# Patient Record
Sex: MALE | Race: WHITE | Employment: FULL TIME | URBAN - METROPOLITAN AREA
[De-identification: names, ages, dates, MRNs, and addresses within clinical notes are randomized per-mention and may not be internally consistent; named-entity substitution may affect disease eponyms.]

---

## 2017-10-30 ENCOUNTER — OFFICE VISIT (OUTPATIENT)
Dept: FAMILY MEDICINE | Facility: CLINIC | Age: 42
End: 2017-10-30
Payer: COMMERCIAL

## 2017-10-30 VITALS
OXYGEN SATURATION: 96 % | HEART RATE: 96 BPM | TEMPERATURE: 99.6 F | SYSTOLIC BLOOD PRESSURE: 135 MMHG | WEIGHT: 218 LBS | BODY MASS INDEX: 32.29 KG/M2 | HEIGHT: 69 IN | DIASTOLIC BLOOD PRESSURE: 84 MMHG

## 2017-10-30 DIAGNOSIS — M54.42 CHRONIC BILATERAL LOW BACK PAIN WITH BILATERAL SCIATICA: Primary | ICD-10-CM

## 2017-10-30 DIAGNOSIS — G89.29 CHRONIC BILATERAL LOW BACK PAIN WITH BILATERAL SCIATICA: Primary | ICD-10-CM

## 2017-10-30 DIAGNOSIS — M54.41 CHRONIC BILATERAL LOW BACK PAIN WITH BILATERAL SCIATICA: Primary | ICD-10-CM

## 2017-10-30 PROCEDURE — 99214 OFFICE O/P EST MOD 30 MIN: CPT | Performed by: FAMILY MEDICINE

## 2017-10-30 NOTE — PROGRESS NOTES
SUBJECTIVE:  Rik Lyons is a 41 year old male who is seen for  back injury that occurred 2 day(s) ago. The onset of the pain was sudden.    He has had back pain off and on for about 7 years. He reports that he sees a chiropracter(ic) as needed. He sometimes has to miss work because of his back. He owns a wood polly business.     He was at the "[employer] on Saturday and after his first jump he had the sudden onset of pain in his lower back and down both legs to his ankle. The pain was very intense and he crawled to the side of the [employer]. It has gotten somewhat better but he still has pain down both legs. The pain is in the central lower back.      The patient reports that the pain radiates into the right foot and radiates into the left foot.  On Right  It radiates to the lateral thigh.  It radiates to the lateral knee.  It radiates to the lateral shin.      On Left.   It radiates to the lateral thigh.  It radiates to the lateral knee.  It radiates to the lateral shin.            The patient denies incontinence of urine or stool.    The patient reports that he is employed as a hard wood  and does have a physical job that demands use of his back.  The patient reports that he does not play sports except golf  The patients past medical, surgical, social and family histories were reviewed.  Social History     Social History     Marital status:      Spouse name: N/A     Number of children: N/A     Years of education: N/A     Social History Main Topics     Smoking status: Former Smoker     Types: Cigarettes     Smokeless tobacco: Never Used     Alcohol use Yes      Comment: Occ     Drug use: No     Sexual activity: Yes     Partners: Female     Other Topics Concern     Parent/Sibling W/ Cabg, Mi Or Angioplasty Before 65f 55m? No     Social History Narrative     History reviewed. No pertinent past medical history.  Current Outpatient Prescriptions   Medication Sig Dispense Refill      "nabumetone (RELAFEN) 750 MG tablet 1 tablet twice a day for 10 days and then as needed 60 tablet 1     Loratadine (CLARITIN PO)                  Allergies as of 10/30/2017     (No Known Allergies)     Current Outpatient Prescriptions   Medication     nabumetone (RELAFEN) 750 MG tablet     Loratadine (CLARITIN PO)     No current facility-administered medications for this visit.          Examination:  /84  Pulse 96  Temp 99.6  F (37.6  C) (Oral)  Ht 5' 9\" (1.753 m)  Wt 218 lb (98.9 kg)  SpO2 96%  BMI 32.19 kg/m2  General: healthy, alert and no distress.  Neuro exam of the lower extremities.   DTR's  Right knee 2+  Right ankle 1+  Left knee 2+  Left ankle1+  Strength was +5 globally in the lower extremities except left great toe extension on the left     Back examination: There was no tenderness to palpation of the upper lumbar vertebrae, lower lumbar vertabrae, right paraspinal muscles in the upper lumbar region, right paraspinal muscles in the lower lumbar region, left paraspinal muscles in the upper lumbar region, left paraspinal muscles in the lower lumbar region, right sacroiliac joint and left sacroliliac joint.  There was not CVA tenderness bilaterally.  Straight leg raise test was negative bilaterally.  There was decreased active range of motion in hip flexion bilaterally         Pace sign: negative bilateral(ly)   (Having the seated patient resist abduction and external rotation. Pain and reproduction of symptoms marks a positive test)         ASSESSMENT/IMPRESSION:  DIFFERENTIAL DIAGNOSIS:  musculoskeletal low back pain, compression fracture, acute disc herniation, spinal stenosis. Acute radiculaopathy        PLAN:    The patient and I discussed the absolute importance of continuing to do range of motion exercises and strengthening exercises despite the immediate discomfort that it may cause.We discussed the use of non steroidal anti-inflammatory drugs to help them in this goal. Heat therapy in " the morning to improve range of motion was emphasized.   We will start the patient on relafen .   We will get a lumbar MRI to better evaluate the patient's spine.  We will order a epidural steroid injection and have the patient follow up 1 month after the injection.

## 2017-10-30 NOTE — MR AVS SNAPSHOT
"              After Visit Summary   10/30/2017    Rik Lyons    MRN: [id number]           Patient Information     Date Of Birth          1975        Visit Information        Provider Department      10/30/2017 9:30 AM Eagle Jackson MD [facility]        Today's Diagnoses     Chronic bilateral low back pain with bilateral sciatica    -  1      Care Instructions    Please call our [facility] Scheduling Line at [phone] to schedule your:    MRI                  Follow-ups after your visit        Future tests that were ordered for you today     Open Future Orders        Priority Expected Expires Ordered    MR Lumbar Spine w/o Contrast Routine  10/30/2018 10/30/2017            Who to contact     If you have questions or need follow up information about today's clinic visit or your schedule please contact [facility] directly at [phone].  Normal or non-critical lab and imaging results will be communicated to you by MyChart, letter or phone within 4 business days after the clinic has received the results. If you do not hear from us within 7 days, please contact the clinic through MyChart or phone. If you have a critical or abnormal lab result, we will notify you by phone as soon as possible.  Submit refill requests through [employer] or call your pharmacy and they will forward the refill request to us. Please allow 3 business days for your refill to be completed.          Additional Information About Your Visit        MyChart Information     [employer] lets you send messages to your doctor, view your test results, renew your prescriptions, schedule appointments and more. To sign up, go to [email or web address] . Click on \"Log in\" on the left side of the screen, which will take you to the Welcome page. Then click on \"Sign up Now\" on the right side of the page.     You will be asked to enter the access code listed below, as well as some personal information. Please follow the " "directions to create your username and password.     Your access code is: [email or web address]  Expires: 2018 10:15 AM     Your access code will  in 90 days. If you need help or a new code, please call your [facility] clinic or [phone].        Care EveryWhere ID     This is your Care EveryWhere ID. This could be used by other organizations to access your [facility] medical records  [id number]        Your Vitals Were     Pulse Temperature Height Pulse Oximetry BMI (Body Mass Index)       96 99.6  F (37.6  C) (Oral) 5' 9\" (1.753 m) 96% 32.19 kg/m2        Blood Pressure from Last 3 Encounters:   10/30/17 135/84   16 122/80   16 133/82    Weight from Last 3 Encounters:   10/30/17 218 lb (98.9 kg)   16 214 lb (97.1 kg)   16 211 lb (95.7 kg)                 Today's Medication Changes          These changes are accurate as of: 10/30/17 10:15 AM.  If you have any questions, ask your nurse or doctor.               Start taking these medicines.        Dose/Directions    nabumetone 750 MG tablet   Commonly known as:  RELAFEN   Used for:  Chronic bilateral low back pain with bilateral sciatica   Started by:  Eagle Jackson MD        1 tablet twice a day for 10 days and then as needed   Quantity:  60 tablet   Refills:  1            Where to get your medicines      These medications were sent to [facility] [address] [place] [address]  [facility]   [facility] [address]     Phone:  [phone]     nabumetone 750 MG tablet                Primary Care Provider Office Phone # Fax #    Darrell Jones [phone] [address] [facility] [address]        Equal Access to Services     JESSICA DICKSON : Rhiannon coleman Soomaali, waaxda luqadaha, qaybta kaalmada iram, kiki martin. So [facility] [phone].    ATENCIÓN: Si habla español, tiene a mcginnis disposición servicios " luis de asistencia lingüística. Roxy giles [phone].    We comply with applicable federal civil rights laws and [place] laws. We do not discriminate on the basis of race, color, national origin, age, disability, sex, sexual orientation, or gender identity.            Thank you!     Thank you for choosing [facility]  [facility]. Our goal is always to provide you with excellent care. Hearing back from our patients is one way we can continue to improve our services. Please take a few minutes to complete the written survey that you may receive in the mail after your visit with us. Thank you!             Your Updated Medication List - Protect others around you: Learn how to safely use, store and throw away your medicines at www.disposemymeds.org.          This list is accurate as of: 10/30/17 10:15 AM.  Always use your most recent med list.                   Brand Name Dispense Instructions for use Diagnosis    CLARITIN PO           nabumetone 750 MG tablet    RELAFEN    60 tablet    1 tablet twice a day for 10 days and then as needed    Chronic bilateral low back pain with bilateral sciatica

## 2017-10-30 NOTE — NURSING NOTE
"Chief Complaint   Patient presents with     Back Pain     lower, x 2 weeks. was at a [email or web address]       Initial BP (!) 153/98  Pulse 96  Temp 99.6  F (37.6  C) (Oral)  Ht 5' 9\" (1.753 m)  Wt 218 lb (98.9 kg)  SpO2 96%  BMI 32.19 kg/m2 Estimated body mass index is 32.19 kg/(m^2) as calculated from the following:    Height as of this encounter: 5' 9\" (1.753 m).    Weight as of this encounter: 218 lb (98.9 kg).  Medication Reconciliation: complete     Domi Bray, long      "

## 2017-10-31 ENCOUNTER — HOSPITAL ENCOUNTER (OUTPATIENT)
Dept: MRI IMAGING | Facility: CLINIC | Age: 42
Discharge: HOME | End: 2017-10-31
Attending: FAMILY MEDICINE | Admitting: FAMILY MEDICINE
Payer: COMMERCIAL

## 2017-10-31 DIAGNOSIS — M54.42 CHRONIC BILATERAL LOW BACK PAIN WITH BILATERAL SCIATICA: ICD-10-CM

## 2017-10-31 DIAGNOSIS — M54.41 CHRONIC BILATERAL LOW BACK PAIN WITH BILATERAL SCIATICA: ICD-10-CM

## 2017-10-31 DIAGNOSIS — G89.29 CHRONIC BILATERAL LOW BACK PAIN WITH BILATERAL SCIATICA: ICD-10-CM

## 2017-10-31 PROCEDURE — 72148 MRI LUMBAR SPINE W/O DYE: CPT

## 2017-12-05 ENCOUNTER — TELEPHONE (OUTPATIENT)
Dept: PALLIATIVE MEDICINE | Facility: CLINIC | Age: 42
End: 2017-12-05

## 2017-12-05 ENCOUNTER — OFFICE VISIT (OUTPATIENT)
Dept: FAMILY MEDICINE | Facility: CLINIC | Age: 42
End: 2017-12-05
Payer: COMMERCIAL

## 2017-12-05 VITALS
OXYGEN SATURATION: 100 % | DIASTOLIC BLOOD PRESSURE: 88 MMHG | SYSTOLIC BLOOD PRESSURE: 135 MMHG | HEART RATE: 81 BPM | HEIGHT: 69 IN | WEIGHT: 214 LBS | BODY MASS INDEX: 31.7 KG/M2

## 2017-12-05 DIAGNOSIS — M54.42 CHRONIC MIDLINE LOW BACK PAIN WITH BILATERAL SCIATICA: Primary | ICD-10-CM

## 2017-12-05 DIAGNOSIS — G89.29 CHRONIC MIDLINE LOW BACK PAIN WITH BILATERAL SCIATICA: Primary | ICD-10-CM

## 2017-12-05 DIAGNOSIS — M54.41 CHRONIC MIDLINE LOW BACK PAIN WITH BILATERAL SCIATICA: Primary | ICD-10-CM

## 2017-12-05 PROCEDURE — 99213 OFFICE O/P EST LOW 20 MIN: CPT | Performed by: PHYSICIAN ASSISTANT

## 2017-12-05 NOTE — NURSING NOTE
"Chief Complaint   Patient presents with     Back Pain       Initial /88  Pulse 81  Ht 5' 9\" (1.753 m)  Wt 214 lb (97.1 kg)  SpO2 100%  BMI 31.6 kg/m2 Estimated body mass index is 31.6 kg/(m^2) as calculated from the following:    Height as of this encounter: 5' 9\" (1.753 m).    Weight as of this encounter: 214 lb (97.1 kg).  Medication Reconciliation: complete  Louisa Charles CMA    "

## 2017-12-05 NOTE — TELEPHONE ENCOUNTER
Pre-screening Questions for Radiology Injections:    Injection to be done at which interventional clinic site? [facility]    Procedure ordered by Oppeemily, Ankit Deras    Procedure ordered? Lumbar Epidural Steroid Injection    What insurance would patient like us to bill for this procedure? BCBS      Worker's comp or MVA (motor vehicle accident) -Any injection DO NOT SCHEDULE and route to Breana Villafana.      [employer] insurance - For SI joint injections, DO NOT SCHEDULE and route Poornima Reyes.      [employer]- MBB's must be scheduled at LEAST two weeks apart      [employer] - Any injection besides hip/shoulder/knee joint DO NOT SCHEDULE and route to Poornima Reyes. She will obtain PA and call pt back to schedule procedure or notify pt of denial.       HP CIGNA-PA REQUIRED FOR NON-ERICKA OR Joint injections    Any chance of pregnancy? Not Applicable   If YES, do NOT schedule and route to RN pool    Is an  needed? No     Patient has a drive home? (mandatory) YES:     Is patient taking any blood thinners (plavix, coumadin, jantoven, warfarin, heparin, pradaxa or dabigatran )? No   If hold needed, do NOT schedule, route to RN pool     Is patient taking any aspirin products? No     If more than 325mg/day do NOT schedule; route to RN pool     For CERVICAL procedures, hold all aspirin products for 6 days.      Does the patient have a bleeding or clotting disorder? No     If YES, okay to schedule AND route to RN nurse pool    **For any patients with platelet count <100, must be forwarded to provider**    Is patient diabetic?  No  If YES, have them bring their glucometer.    Does patient have an active infection or treated for one within the past week? No     Is patient currently taking any antibiotics?  No     For patients on chronic, preventative, or prophylactic antibiotics, procedures may be scheduled.     For patients on antibiotics for active or recent infection:    Lele Berger  Conchita Benito Burton, Snitzer-antibiotic course must have been completed for 4 days    Dr. Powell-antibiotic course must have been completed for 7 days    Is patient currently taking any steroid medications? (i.e. Prednisone, Medrol)  No     For patients on steroid medications:    Conchita Morgan Burton, Snitzer-steroid course must have been completed for 4 days    -steroid course must have been completed for 7 days    Reviewed with patient:  If you are started on any steroids or antibiotics between now and your appointment, you must contact us because it may affect our ability to perform your procedure.  Yes    Is patient actively being treated for cancer or immunocompromised? No  If YES, do NOT schedule and route to RN pool     Are you able to get on and off an exam table with minimal or no assistance? Yes  If NO, do NOT schedule and route to RN pool    Are you able to roll over and lay on your stomach with minimal or no assistance? Yes  If NO, do NOT schedule and route to RN pool     Any allergies to contrast dye, iodine, shellfish, or numbing and steroid medications? No  If YES, route to RN pool AND add allergy information to appointment notes    Allergies: Review of patient's allergies indicates no known allergies.      Has the patient had a flu shot or any other vaccinations within 7 days before or after the procedure.  No     Does patient have an MRI/CT?  YES:   (SI joint, hip injections, lumbar sympathetic blocks, and stellate ganglion blocks do not require an MRI)    Was the MRI done w/in the last 3 years?  Yes    Was MRI done at [facility]? Yes      If not, where was it done? N/A       If MRI was not done at [facility], [facility] or [facility] do NOT schedule and route to nursing.  If pt has an imaging disc, the injection may be scheduled but pt has to bring disc to appt. If they show up w/out disc the injection cannot be done    Reminders (please tell patient if applicable):       Instructed pt  to arrive 30 minutes early for IV start if this is for a cervical procedure, ALL sympathetic (stellate ganglion, hypogastric, or lumbar sympathetic block) and all sedation procedures (RFA, spinal cord stimulation trials).  Not Applicable   -IVs are not routinely placed for Dr. Sutherland cervical cases   -Dr. Chan: IVs for cervical ESIs and cervical TBDs (not CMBBs/facet inj)      If NPO for sedation, informed patient that it is okay to take medications with sips of water (except if they are to hold blood thinners).  Not Applicable   *DO take blood pressure medication if it is prescribed*      If this is for a cervical ERICKA, informed patient that aspirin needs to be held for 6 days.   Not Applicable      For all patients not having spinal cord stimulator (SCS) trials or radiofrequency ablations (RFAs), informed patient:    IV sedation is not provided for this procedure.  If you feel that an oral anti-anxiety medication is needed, you can discuss this further with your referring provider or primary care provider.  The Pain Clinic provider will discuss specifics of what the procedure includes at your appointment.  Most procedures last 10-20 minutes.  We use numbing medications to help with any discomfort during the procedure.  Not Applicable      Do not schedule procedures requiring IV placement in the first appointment of the day or first appointment after lunch.       For patients 85 or older we recommend having an adult stay w/ them for the remainder of the day.       Does the patient have any questions?  NO  Joe Bermudez  [facility]

## 2017-12-05 NOTE — MR AVS SNAPSHOT
After Visit Summary   12/5/2017    Rik Lyons    MRN: [id number]           Patient Information     Date Of Birth          1975        Visit Information        Provider Department      12/5/2017 2:10 PM Ankit Schulte PA-C [facility]        Today's Diagnoses     Chronic midline low back pain with bilateral sciatica    -  1       Follow-ups after your visit        Additional Services     PAIN MANAGEMENT REFERRAL       Your provider has referred you to: [facility]: [facility] -    Reason for Referral: Procedure or injection only - patient will be contacted within 24 hrs to schedule: Epidural Steroi: Lumbar    Please complete the following questions:    What is your diagnosis for the patient's pain? Low back pain with radiculopathy    Do you have any specific questions for the pain specialist? No    Are there any red flags that may impact the assessment or management of the patient? None    For any questions, contact the [facility] at [phone].     **ANY DIAGNOSTIC TESTS THAT ARE NOT IN EPIC SHOULD BE SENT TO THE PAIN CENTER**    REGARDING OPIOID MEDICATIONS:  We will always address appropriateness of opioid pain medications, but we generally will not automatically take on a prescribing role. When we do take on prescribing of opioids for chronic pain, it is in collaboration with the referring physician for an intermediate period of time (months), with an expectation that the primary physician or provider will assume the prescribing role if medications are effective at stable doses with demonstrated compliance.  Therefore, please do not assume that your prescribing responsibilities end on the day of pain clinic consultation.  Is this agreeable to you? YES    Please be aware that coverage of these services is subject to the terms and limitations of your health insurance plan.  Call member services at your health plan with any benefit or  "coverage questions.      Please bring the following with you to your appointment:    (1) Any X-Rays, CTs or MRIs which have been performed.  Contact the facility where they were done to arrange for  prior to your scheduled appointment.    (2) List of current medications   (3) This referral request   (4) Any documents/labs given to you for this referral                  Who to contact     If you have questions or need follow up information about today's clinic visit or your schedule please contact [facility] directly at [phone].  Normal or non-critical lab and imaging results will be communicated to you by [employer], letter or phone within 4 business days after the clinic has received the results. If you do not hear from us within 7 days, please contact the clinic through [employer] or phone. If you have a critical or abnormal lab result, we will notify you by phone as soon as possible.  Submit refill requests through [employer] or call your pharmacy and they will forward the refill request to us. Please allow 3 business days for your refill to be completed.          Additional Information About Your Visit        [employer] Information     [employer] lets you send messages to your doctor, view your test results, renew your prescriptions, schedule appointments and more. To sign up, go to [email or web address] . Click on \"Log in\" on the left side of the screen, which will take you to the Welcome page. Then click on \"Sign up Now\" on the right side of the page.     You will be asked to enter the access code listed below, as well as some personal information. Please follow the directions to create your username and password.     Your access code is: [email or web address]  Expires: 2018  9:15 AM     Your access code will  in 90 days. If you need help or a new code, please call your [facility] or [phone].        Care EveryWhere ID     This is your Care EveryWhere ID. This could be used by other " "organizations to access your [facility] medical records  [id number]        Your Vitals Were     Pulse Height Pulse Oximetry BMI (Body Mass Index)          81 5' 9\" (1.753 m) 100% 31.6 kg/m2         Blood Pressure from Last 3 Encounters:   12/05/17 135/88   10/30/17 135/84   12/16/16 122/80    Weight from Last 3 Encounters:   12/05/17 214 lb (97.1 kg)   10/30/17 218 lb (98.9 kg)   12/16/16 214 lb (97.1 kg)              We Performed the Following     PAIN MANAGEMENT REFERRAL        Primary Care Provider Office Phone # Fax #    Darrell Jones [phone] [address] [facility] [address]        Equal Access to Services     LOU DICKSON : Hadii marlene miller hadasho Soomaali, waaxda luqadaha, qaybta kaalmada adeegyada, waxtariq byrd hayrama langley . So [facility] [place] [phone].    ATENCIÓN: Si habla español, tiene a mcginnis disposición servicios gratuitos de asistencia lingüística. Llame al [phone].    We comply with applicable federal civil rights laws and [place] laws. We do not discriminate on the basis of race, color, national origin, age, disability, sex, sexual orientation, or gender identity.            Thank you!     Thank you for choosing [facility]  for your care. Our goal is always to provide you with excellent care. Hearing back from our patients is one way we can continue to improve our services. Please take a few minutes to complete the written survey that you may receive in the mail after your visit with us. Thank you!             Your Updated Medication List - Protect others around you: Learn how to safely use, store and throw away your medicines at www.disposemymeds.org.          This list is accurate as of: 12/5/17  2:28 PM.  Always use your most recent med list.                   Brand Name Dispense Instructions for use Diagnosis    CLARITIN PO           nabumetone 750 MG tablet    RELAFEN    60 tablet    1 tablet twice a day for 10 days and then as needed "    Chronic bilateral low back pain with bilateral sciatica

## 2017-12-05 NOTE — PROGRESS NOTES
SUBJECTIVE:                                                    Rik Lyons is a 42 year old male who presents to clinic today for the following health issues:    Back Pain      Duration: 1.5 months        Specific cause: [place] [employer] [place]    Description:   Location of pain: low back   Character of pain: sharp and dull ache  Pain radiation:down into bilateral legs, groin area   New numbness or weakness in legs, not attributed to pain:  no     Intensity: Currently 3/10    History:   Pain interferes with job: YES  History of back problems: yes, off and on for the last 7 years  Any previous MRI or X-rays: Yes- at [facility].  Date 10/31/17  Sees a specialist for back pain:  No  Therapies tried without relief:     Alleviating factors:   Improved by: ibuprofen and Relafen help some       Precipitating factors:  Worsened by: Lifting, Bending and Sitting for period of time    bernard in bernard thighs and in groin. No loss of bowl or bladder function.       Here today expecting a cortisone injection in back. Was told that I do them in clinic when he called in to make an appointment.     Problem list and histories reviewed & adjusted, as indicated.  Additional history: as documented    Patient Active Problem List   Diagnosis     Headache     Cervicalgia     Elevated blood pressure reading without diagnosis of hypertension     Past Surgical History:   Procedure Laterality Date     APPENDECTOMY         Social History   Substance Use Topics     Smoking status: Former Smoker     Types: Cigarettes     Smokeless tobacco: Never Used     Alcohol use Yes      Comment: Occ     Family History   Problem Relation Age of Onset     Other Cancer Mother      Coronary Artery Disease Father      age 53     DIABETES Father          Current Outpatient Prescriptions   Medication Sig Dispense Refill     nabumetone (RELAFEN) 750 MG tablet 1 tablet twice a day for 10 days and then as needed 60 tablet 1     Loratadine (CLARITIN PO)        No Known  "Allergies  Problem list, Medication list, Allergies, and Medical/Social/Surgical histories reviewed in [facility] and updated as appropriate.    ROS:  C: NEGATIVE for fever, chills, change in weight  E/M: NEGATIVE for ear, mouth and throat problems  R: NEGATIVE for significant cough or SOB  CV: NEGATIVE for chest pain, palpitations or peripheral edema    OBJECTIVE:                                                    /88  Pulse 81  Ht 5' 9\" (1.753 m)  Wt 214 lb (97.1 kg)  SpO2 100%  BMI 31.6 kg/m2  Body mass index is 31.6 kg/(m^2).   GENERAL: healthy, alert, well nourished, well hydrated, no distress  RESP: lungs clear to auscultation - no rales, no rhonchi, no wheezes  CV: regular rates and rhythm, normal S1 S2, no S3 or S4 and no murmur, no click or rub -  ABDOMEN: soft, no tenderness, no  hepatosplenomegaly, no masses, normal bowel sounds  Lumber/Thoracic Spine Exam: Tender:  None today  Range of Motion:  full range of motion bernard lower extremities   Strength:  5/5 bernard lower extremities   Special tests:  negative straight leg raises, today  Hip Exam: Hip ROM full    Diagnostic test results:  Diagnostic Test Results:  Results for orders placed or performed during the hospital encounter of 10/31/17   MR Lumbar Spine w/o Contrast    Narrative    MR LUMBAR SPINE WITHOUT CONTRAST 10/31/2017 8:30 AM    HISTORY: Bilateral low back pain and sciatica.    TECHNIQUE: Sagittal T1 and T2, sagittal IR, and transverse proton  density and T2-weighted pulse sequences.    FINDINGS: Five lumbar vertebrae are assumed. Mild loss of disc signal  with relatively normal disc height is noted at L5-S1. Moderate left  L4-5 apophyseal joint degenerative arthrosis is noted with mild  periarticular reactive marrow edema. Body heights and sagittal  alignment are within normal limits. The conus medullaris is  unremarkable in appearance on the sagittal images.     L2-3, L3-4: Normal.    L4-5: Annular bulge or broad-based central disc " protrusion minimally  indenting the thecal sac. No central stenosis. Mild to moderate  bilateral foraminal stenosis below the exiting nerve roots.    L5-S1: Small central disc extrusion with slight cephalad dissection  slightly to the right of midline. This only mildly indents the thecal  sac. Small 0.3 cm disc fragment is also noted lateral to the right S1  nerve root near its origin from the thecal sac. There is no central  stenosis or asymmetrical displacement of either S1 nerve root. The L5  neural foramina are widely patent.      Impression    IMPRESSION:  1. L4-5 left apophyseal joint degenerative arthrosis and periarticular  reactive marrow edema suggesting this could be a pain generator.  2. L4-5 mild to moderate bilateral foraminal stenosis.  3. L5-S1 small cephalad dissecting disc extrusion to the right of  midline without apparent direct neural impingement or stenosis. Small  0.3 cm disc fragment is also noted lateral to the right S1 nerve root  near its origin from the thecal sac. No nerve root displacement is  demonstrated.    ALDAIR ELENA MD          ASSESSMENT/PLAN:                                                      ICD-10-CM    1. Chronic midline low back pain with bilateral sciatica M54.41 PAIN MANAGEMENT REFERRAL    M54.42     G89.29    discussed that I don't perform epidural injections and that will need to be ordered and done by specialist.   Discussed other options of tx which include and not limited to: PHYSICAL THERAPY , chiropractor, oral prednisone.   Wants epidural injection, this was ordered.     Ankit Schulte PA-C  [facility]

## 2017-12-21 ENCOUNTER — RADIANT APPOINTMENT (OUTPATIENT)
Dept: RADIOLOGY | Facility: CLINIC | Age: 42
End: 2017-12-21
Attending: PAIN MEDICINE
Payer: COMMERCIAL

## 2017-12-21 ENCOUNTER — RADIOLOGY INJECTION OFFICE VISIT (OUTPATIENT)
Dept: PALLIATIVE MEDICINE | Facility: CLINIC | Age: 42
End: 2017-12-21
Payer: COMMERCIAL

## 2017-12-21 VITALS — SYSTOLIC BLOOD PRESSURE: 162 MMHG | DIASTOLIC BLOOD PRESSURE: 108 MMHG | HEART RATE: 97 BPM

## 2017-12-21 DIAGNOSIS — M54.41 CHRONIC BILATERAL LOW BACK PAIN WITH BILATERAL SCIATICA: ICD-10-CM

## 2017-12-21 DIAGNOSIS — G89.29 CHRONIC BILATERAL LOW BACK PAIN WITH BILATERAL SCIATICA: ICD-10-CM

## 2017-12-21 DIAGNOSIS — M54.42 CHRONIC BILATERAL LOW BACK PAIN WITH BILATERAL SCIATICA: ICD-10-CM

## 2017-12-21 DIAGNOSIS — M54.16 LUMBAR RADICULOPATHY: Primary | ICD-10-CM

## 2017-12-21 PROCEDURE — 62323 NJX INTERLAMINAR LMBR/SAC: CPT | Performed by: PAIN MEDICINE

## 2017-12-21 ASSESSMENT — PAIN SCALES - GENERAL: PAINLEVEL: MILD PAIN (3)

## 2017-12-21 NOTE — PROGRESS NOTES
Pre procedure Diagnosis: Lumbar radiculopathy  Post procedure Diagnosis: Same  Procedure performed: L4-5 interlaminar epidural steroid injection   Anesthesia: None  Complications: None  Operators: Rajiv Chan MD     Indications:   Rik Lyons is a 42 year old male.  The patient has a history of bilateral low back pain radiating to the anterior surface of the foot.  Examination shows positive SLR.  he has tried conservative treatment including meds.    MRI   IMPRESSION:  1. L4-5 left apophyseal joint degenerative arthrosis and periarticular  reactive marrow edema suggesting this could be a pain generator.  2. L4-5 mild to moderate bilateral foraminal stenosis.  3. L5-S1 small cephalad dissecting disc extrusion to the right of  midline without apparent direct neural impingement or stenosis. Small  0.3 cm disc fragment is also noted lateral to the right S1 nerve root  near its origin from the thecal sac. No nerve root displacement is  demonstrated.  Options/alternatives, benefits and risks were discussed with the patient including but not limited to bleeding, infection, no pain relief, tissue trauma, exposure to radiation, reaction to medications, spinal cord injury, dural puncture, weakness, numbness and headache.  Questions were answered to his satisfaction and he wishes to proceed. Voluntary informed consent was obtained and signed.     Vitals were reviewed: Yes  Allergies were reviewed:  Yes   Medications were reviewed:  Yes   Pre-procedure pain score: 3/10    Procedure:  The patient's medical history, medications, and allergies were reviewed and reconciled.  After obtaining signed informed consent, the patient was brought into the procedure suite and was placed in a prone position on the procedure table.   A Pause for the Cause was performed.  Patient was prepped and draped in the usual sterile fashion.     The L 4-5 interspace was identified with AP fluoroscopy.  A total of 6ml of 1% lidocaine was used to  anesthetize the skin and subcutaneous tissues for a  midline approach.    A 20gauge 3.5inch Touhy needle was advanced utilizing intermittent AP and Lateral fluoroscopy and air for loss of resistance.  The epidural space was encountered on the first pass without difficulties.  Aspiration for blood and CSF was negative.  Needle position was verified by injecting 1 ml of Omnipaque utilizing real-time fluoroscopy that showed good needle placement and epidural spread without signs of intravascular or intrathecal uptake.  Omnipaque wasted:  9 ml.    Then, after repeated negative aspiration for blood or CSF, a combination of Kenalog 40 mg, Bupivicaine 0.25% 2 ml, diluted with 3 ml of normal saline to a total injectate volume of 6 ml was injected into the epidural space in a slow and incremental fashion and the needle was restyletted and withdrawn.  All injected medications were preservative free.    The injection site was cleaned and a sterile dressing was applied.    The patient tolerated the procedure well without complications and was taken to the recovery room for continued observation.    Images were saved to PACS.    Post-procedure pain score: 0/10  Follow-up includes:   -f/u phone call in one week  -f/u with referring provider     Rajiv Chan MD  [facility]

## 2017-12-21 NOTE — MR AVS SNAPSHOT
After Visit Summary   12/21/2017    Rik Lyons    MRN: [id number]           Patient Information     Date Of Birth          1975        Visit Information        Provider Department      12/21/2017 1:15 PM Rajiv Chan MD [facility]        Care Instructions    [facility]   Procedure Discharge Instructions    Today you saw: Dr. Rajiv Chan    You had an:  Epidural steroid injection   -lumbar     Medications used:  Lidocaine   Bupivacaine  Omnipaque    Kenalog             Be cautious when walking. Numbness and/or weakness in the lower extremities may occur for up to 6-8 hours after the procedure due to effect of the local anesthetic    Do not drive for 6 hours. The effect of the local anesthetic could slow your reflexes.     You may resume your regular activities after 24 hours    Avoid strenuous activity for the first 24 hours    You may shower, however avoid swimming, tub baths or hot tubs for 24 hours following your procedure    You may have a mild to moderate increase in pain for several days following the injection.    It may take up to 14 days for the steroid medication to start working although you may feel the effect as early as a few days after the procedure.       You may use ice packs for 10-15 minutes, 3 to 4 times a day at the injection site for comfort    Do not use heat to painful areas for 6 to 8 hours. This will give the local anesthetic time to wear off and prevent you from accidentally burning your skin.     You may use anti-inflammatory medications (such as Ibuprofen or Aleve or Advil) or Tylenol for pain control if necessary    If you were fasting, you may resume your normal diet and medications after the procedure    If you have diabetes, check your blood sugar more frequently than usual as your blood sugar may be higher than normal for 10-14 days following a steroid injection. Contact your doctor who manages your diabetes  "if your blood sugar is higher than usual    If you experience any of the following, call the pain center nursing line during work hours at [phone] or the after hours provider line at [phone]:  -Fever over 100 degree F  -Swelling, bleeding, redness, drainage, warmth at the injection site  -Progressive weakness or numbness in your legs or arms  -Loss of bowel or bladder function  -Unusual headache that is not relieved by Tylenol or other pain reliever  -Unusual new onset of pain that is not improving      Phone #s:  Appointment line: [phone];  Nurse line: [phone]              Follow-ups after your visit        Who to contact     If you have questions or need follow up information about today's clinic visit or your schedule please contact [facility] directly at [phone].  Normal or non-critical lab and imaging results will be communicated to you by MyChart, letter or phone within 4 business days after the clinic has received the results. If you do not hear from us within 7 days, please contact the clinic through [employer] or phone. If you have a critical or abnormal lab result, we will notify you by phone as soon as possible.  Submit refill requests through [employer] or call your pharmacy and they will forward the refill request to us. Please allow 3 business days for your refill to be completed.          Additional Information About Your Visit        [email or web address] Information     [employer] lets you send messages to your doctor, view your test results, renew your prescriptions, schedule appointments and more. To sign up, go to [email or web address] . Click on \"Log in\" on the left side of the screen, which will take you to the Welcome page. Then click on \"Sign up Now\" on the right side of the page.     You will be asked to enter the access code listed below, as well as some personal information. Please follow the directions to create your username and password.     Your access code is: " [email or web address]  Expires: 2018  9:15 AM     Your access code will  in 90 days. If you need help or a new code, please call your [facility] clinic or [phone].        Care EveryWhere ID     This is your Care EveryWhere ID. This could be used by other organizations to access your [facility] medical records  [id number]        Your Vitals Were     Pulse                   97            Blood Pressure from Last 3 Encounters:   17 (!) 162/108   17 135/88   10/30/17 135/84    Weight from Last 3 Encounters:   17 97.1 kg (214 lb)   10/30/17 98.9 kg (218 lb)   16 97.1 kg (214 lb)              Today, you had the following     No orders found for display       Primary Care Provider Office Phone # Fax #    Darrell Jose Jones [phone] [address] [facility] [address]        Equal Access to Services     JESSICA [facility] Hospital[facility] : Hadii aad ku hadasho Soomaali, waaxda luqadaha, qaybta kaalmada adeegyada, waxay idiin hayaan adeeg kharash la'cucon . So [facility] [phone].    ATENCIÓN: Si habla español, tiene a mcginnis disposición servicios gratuitos de asistencia lingüística. Llame al [phone].    We comply with applicable federal civil rights laws and [place] laws. We do not discriminate on the basis of race, color, national origin, age, disability, sex, sexual orientation, or gender identity.            Thank you!     Thank you for choosing [facility]  for your care. Our goal is always to provide you with excellent care. Hearing back from our patients is one way we can continue to improve our services. Please take a few minutes to complete the written survey that you may receive in the mail after your visit with us. Thank you!             Your Updated Medication List - Protect others around you: Learn how to safely use, store and throw away your medicines at www.disposemymeds.org.          This list is accurate as of: 17  1:46 PM.  Always use your most recent med  list.                   Brand Name Dispense Instructions for use Diagnosis    CLARITIN PO           nabumetone 750 MG tablet    RELAFEN    60 tablet    1 tablet twice a day for 10 days and then as needed    Chronic bilateral low back pain with bilateral sciatica

## 2017-12-21 NOTE — PATIENT INSTRUCTIONS
[facility]   Procedure Discharge Instructions    Today you saw: Dr. Rajiv Chan    You had an:  Epidural steroid injection   -lumbar     Medications used:  Lidocaine   Bupivacaine  Omnipaque    Kenalog             Be cautious when walking. Numbness and/or weakness in the lower extremities may occur for up to 6-8 hours after the procedure due to effect of the local anesthetic    Do not drive for 6 hours. The effect of the local anesthetic could slow your reflexes.     You may resume your regular activities after 24 hours    Avoid strenuous activity for the first 24 hours    You may shower, however avoid swimming, tub baths or hot tubs for 24 hours following your procedure    You may have a mild to moderate increase in pain for several days following the injection.    It may take up to 14 days for the steroid medication to start working although you may feel the effect as early as a few days after the procedure.       You may use ice packs for 10-15 minutes, 3 to 4 times a day at the injection site for comfort    Do not use heat to painful areas for 6 to 8 hours. This will give the local anesthetic time to wear off and prevent you from accidentally burning your skin.     You may use anti-inflammatory medications (such as Ibuprofen or Aleve or Advil) or Tylenol for pain control if necessary    If you were fasting, you may resume your normal diet and medications after the procedure    If you have diabetes, check your blood sugar more frequently than usual as your blood sugar may be higher than normal for 10-14 days following a steroid injection. Contact your doctor who manages your diabetes if your blood sugar is higher than usual    If you experience any of the following, call the pain center nursing line during work hours at [phone] or the after hours provider line at [phone]:  -Fever over 100 degree F  -Swelling, bleeding, redness, drainage, warmth at the injection  site  -Progressive weakness or numbness in your legs or arms  -Loss of bowel or bladder function  -Unusual headache that is not relieved by Tylenol or other pain reliever  -Unusual new onset of pain that is not improving      Phone #s:  Appointment line: [phone];  Nurse line: [phone]

## 2017-12-21 NOTE — NURSING NOTE
"Chief Complaint   Patient presents with     Pain       Initial BP (!) 162/108  Pulse 97 Estimated body mass index is 31.6 kg/(m^2) as calculated from the following:    Height as of 12/5/17: 1.753 m (5' 9\").    Weight as of 12/5/17: 97.1 kg (214 lb).  Medication Reconciliation: complete     Pre-procedure Intake    Have you been fasting? NA    If yes, for how long? No     Are you taking a prescribed blood thinner such as coumadin, Plavix, Xarelto?    No    If yes, when did you take your last dose? No     Do you take aspirin?  No    If cervical procedure, have you held aspirin for 6 days?   NA    Do you have any allergies to contrast dye, iodine, steroid and/or numbing medications?  Not Applicable    Are you currently taking antibiotics or have an active infection?  NO    Have you had a fever/elevated temperature within the past week? NO    Are you currently taking oral steroids? NO    Do you have a ? Yes       Are you pregnant or breastfeeding?  Not Applicable    Are the vital signs normal?  Yes    Lul Ambriz MA            "

## 2017-12-29 ENCOUNTER — TELEPHONE (OUTPATIENT)
Dept: PALLIATIVE MEDICINE | Facility: CLINIC | Age: 42
End: 2017-12-29

## 2017-12-29 NOTE — TELEPHONE ENCOUNTER
Patient is doing well. He said that the pain in his leg is gone and has not come back. He is hoping for a good few months of relief.    Kimberly Osei RT (R)

## 2018-05-04 NOTE — PATIENT INSTRUCTIONS
Lifestyle recommendations:  Being overweight or obese puts you are risk of major health problems including but not limited to: heart disease/heart attack, stroke, high cholesterol, high blood pressure, and diabetes.  This is why it is important to be at a healthy weight for your height.     Exercise 30 minutes 3-5 times a week, if you can only do 10 minutes 3 times a week that is still shown to have great benefit!  Brisk walking even counts for this.  Consider free youtube videos for exercise that fits your needs and lifestyle.     Monitor your caffeine and soda intake, try to minimize these beverages    Drink plenty of water (about 70-80 ounces a day)    Try to eat a vegetable and fruit  with lunch and dinner.  Have a breakfast that contains protein such as eggs or oatmeal.  Decrease your white bread, pasta, and sweets intake.  Increase lean proteins like chicken or pork. Try to eat out 1-2 times a week or less.  Monitor your portion sizes, try using smaller plates if needed.  Eat slowly, this gives you time to be aware that your body is full.   Let me know at any time if you would like a referral to a nutritionist!            Preventive Health Recommendations  Male Ages 40 to 49    Yearly exam:             See your health care provider every year in order to  o   Review health changes.   o   Discuss preventive care.    o   Review your medicines if your doctor has prescribed any.    You should be tested each year for STDs (sexually transmitted diseases) if you re at risk.     Have a cholesterol test every 5 years.     Have a colonoscopy (test for colon cancer) if someone in your family has had colon cancer or polyps before age 50.     After age 45, have a diabetes test (fasting glucose). If you are at risk for diabetes, you should have this test every 3 years.      Talk with your health care provider about whether or not a prostate cancer screening test (PSA) is right for you.    Shots: Get a flu shot each year.  Get a tetanus shot every 10 years.     Nutrition:    Eat at least 5 servings of fruits and vegetables daily.     Eat whole-grain bread, whole-wheat pasta and brown rice instead of white grains and rice.     Talk to your provider about Calcium and Vitamin D.     Lifestyle    Exercise for at least 150 minutes a week (30 minutes a day, 5 days a week). This will help you control your weight and prevent disease.     Limit alcohol to one drink per day.     No smoking.     Wear sunscreen to prevent skin cancer.     See your dentist every six months for an exam and cleaning.

## 2018-05-04 NOTE — PROGRESS NOTES
SUBJECTIVE:   CC: Rik Lyons is an 42 year old male who presents for preventative health visit.     Healthy Habits:    Answers for HPI/ROS submitted by the patient on 5/7/2018   Annual Exam:  Getting at least 3 servings of Calcium per day:: Yes  Bi-annual eye exam:: NO  Dental care twice a year:: NO  Sleep apnea or symptoms of sleep apnea:: Daytime drowsiness, Excessive snoring  Diet:: Regular (no restrictions)  Frequency of exercise:: 2-3 days/week  Taking medications regularly:: Yes  Medication side effects:: None  Additional concerns today:: No  PHQ-2 Score: 0  Duration of exercise:: 15-30 minutes    Discuss High BP readings per pt was on BP meds in the past. He was on HCTZ in the past but it made him pee too much.  Had high blood pressure at dentist but did drink alcohol in large quantity the night before.         Chronic Back pain from pinched nerves per patient-gets cortisone shots every 6 months. nabumetone PRN.     Alcohol---8-9 drinks only on weekend not every weekend.  More binge drinks.   Caffeine-1 coffee in the morning and 1 in pm, not much pop    Former smoker. E-cig.      bmi 31.  Is trying to lose weight to help with his back, we discussed will also help his blood pressure.     Agrees to PSA.    No skin changes/concerns.     FH: Dad with defib. And CAD And diabetes, And high blood pressure.  Mom with lung cancer. No FH of colon cancer.   No chest pain, shortness of breath, edema, pnd or orthopnea.     Today's PHQ-2 Score:   PHQ-2 ( 1999 Pfizer) 5/7/2018 12/16/2016   Q1: Little interest or pleasure in doing things 0 0   Q2: Feeling down, depressed or hopeless 0 0   PHQ-2 Score 0 0   Q1: Little interest or pleasure in doing things Not at all -   Q2: Feeling down, depressed or hopeless Not at all -   PHQ-2 Score 0 -       Abuse: Current or Past(Physical, Sexual or Emotional)- No  Do you feel safe in your environment - Yes    Social History   Substance Use Topics     Smoking status: Former Smoker      Types: Cigarettes     Smokeless tobacco: Never Used     Alcohol use Yes      Comment: Occ      If you drink alcohol do you typically have >3 drinks per day or >7 drinks per week? Yes - AUDIT SCORE:  7  AUDIT - Alcohol Use Disorders Identification Test - Reproduced from the World Health Organization Audit 2001 (Second Edition) 5/7/2018   1.  How often do you have a drink containing alcohol? 2 to 4 times a month   2.  How many drinks containing alcohol do you have on a typical day when you are drinking? 7 to 9   3.  How often do you have five or more drinks on one occasion? Monthly   4.  How often during the last year have you found that you were not able to stop drinking once you had started? Never   5.  How often during the last year have you failed to do what was normally expected of you because of drinking? Never   6.  How often during the last year have you needed a first drink in the morning to get yourself going after a heavy drinking session? Never   7.  How often during the last year have you had a feeling of guilt or remorse after drinking? Never   8.  How often during the last year have you been unable to remember what happened the night before because of your drinking? Never   9.  Have you or someone else been injured because of your drinking? No   10. Has a relative, friend, doctor or other health care worker been concerned about your drinking or suggested you cut down? No   TOTAL SCORE 7                         Last PSA: No results found for: PSA    Reviewed orders with patient. Reviewed health maintenance and updated orders accordingly - Yes  BP Readings from Last 3 Encounters:   05/07/18 (!) 138/94   12/21/17 (!) 162/108   12/05/17 135/88    Wt Readings from Last 3 Encounters:   05/07/18 212 lb (96.2 kg)   12/05/17 214 lb (97.1 kg)   10/30/17 218 lb (98.9 kg)                  Patient Active Problem List   Diagnosis     Headache     Cervicalgia     Elevated blood pressure reading without diagnosis of  "hypertension     Past Surgical History:   Procedure Laterality Date     APPENDECTOMY         Social History   Substance Use Topics     Smoking status: Former Smoker     Types: Cigarettes     Smokeless tobacco: Never Used     Alcohol use Yes      Comment: Occ     Family History   Problem Relation Age of Onset     Other Cancer Mother      Coronary Artery Disease Father      age 53     DIABETES Father          Current Outpatient Prescriptions   Medication Sig Dispense Refill     Loratadine (CLARITIN PO)        losartan (COZAAR) 25 MG tablet Take 0.5 tablets (12.5 mg) by mouth daily 15 tablet 1     nabumetone (RELAFEN) 750 MG tablet 1 tablet twice a day for 10 days and then as needed 60 tablet 1     [DISCONTINUED] nabumetone (RELAFEN) 750 MG tablet 1 tablet twice a day for 10 days and then as needed (Patient not taking: Reported on 5/7/2018) 60 tablet 1       Reviewed and updated as needed this visit by clinical staff  Tobacco  Allergies  Meds  Med Hx  Surg Hx  Fam Hx  Soc Hx        Reviewed and updated as needed this visit by Provider        Past Medical History:   Diagnosis Date     NO ACTIVE PROBLEMS       Past Surgical History:   Procedure Laterality Date     APPENDECTOMY         ROS:  C: NEGATIVE for fever, chills, change in weight  I: NEGATIVE for worrisome rashes, moles or lesions  E: NEGATIVE for vision changes or irritation  ENT: NEGATIVE for ear, mouth and throat problems  R: NEGATIVE for significant cough or SOB  CV: NEGATIVE for chest pain, palpitations or peripheral edema  GI: NEGATIVE for nausea, abdominal pain, heartburn, or change in bowel habits   male: negative for dysuria, hematuria, decreased urinary stream, erectile dysfunction, urethral discharge  M: NEGATIVE for significant arthralgias or myalgia  N: NEGATIVE for weakness, dizziness or paresthesias  P: NEGATIVE for changes in mood or affect    OBJECTIVE:   BP (!) 138/94  Pulse 104  Temp 98.4  F (36.9  C) (Oral)  Resp 18  Ht 5' 9\" " (1.753 m)  Wt 212 lb (96.2 kg)  SpO2 99%  BMI 31.31 kg/m2  EXAM:  GENERAL: healthy, alert and no distress  EYES: Eyes grossly normal to inspection, PERRL and conjunctivae and sclerae normal  HENT: ear canals and TM's normal, nose and mouth without ulcers or lesions  NECK: no adenopathy, no asymmetry, masses, or scars and thyroid normal to palpation  RESP: lungs clear to auscultation - no rales, rhonchi or wheezes  CV: regular rate and rhythm, normal S1 S2, no S3 or S4, no murmur, click or rub, no peripheral edema and peripheral pulses strong  ABDOMEN: soft, nontender, no hepatosplenomegaly, no masses and bowel sounds normal  MS: no gross musculoskeletal defects noted, no edema  SKIN: no suspicious lesions or rashes  NEURO: Normal strength and tone, mentation intact and speech normal  PSYCH: mentation appears normal, affect normal/bright    ASSESSMENT/PLAN:   1. Encounter for routine adult health examination with abnormal findings      2. Benign essential hypertension  Recheck 2-5 weeks  Side effects discussed  Work on decreasing alcohol and weight  Minimize nsaids as much as possible    - losartan (COZAAR) 25 MG tablet; Take 0.5 tablets (12.5 mg) by mouth daily  Dispense: 15 tablet; Refill: 1    3. Chronic bilateral low back pain with bilateral sciatica    - CBC with platelets differential  - nabumetone (RELAFEN) 750 MG tablet; 1 tablet twice a day for 10 days and then as needed  Dispense: 60 tablet; Refill: 1  - UA reflex to Microscopic    4. Screening for diabetes mellitus    - Comprehensive metabolic panel  - Hemoglobin A1c    5. Lipid screening    - Lipid panel reflex to direct LDL Fasting    6. Screening for prostate cancer    - PSA, screen    COUNSELING:  Reviewed preventive health counseling, as reflected in patient instructions       Regular exercise       Healthy diet/nutrition       Vision screening       Hearing screening       reports that he has quit smoking. His smoking use included Cigarettes. He  "has never used smokeless tobacco.    Estimated body mass index is 31.31 kg/(m^2) as calculated from the following:    Height as of this encounter: 5' 9\" (1.753 m).    Weight as of this encounter: 212 lb (96.2 kg).   Weight management plan: Discussed healthy diet and exercise guidelines and patient will follow up in 12 months in clinic to re-evaluate.     Patient Instructions   Lifestyle recommendations:  Being overweight or obese puts you are risk of major health problems including but not limited to: heart disease/heart attack, stroke, high cholesterol, high blood pressure, and diabetes.  This is why it is important to be at a healthy weight for your height.     Exercise 30 minutes 3-5 times a week, if you can only do 10 minutes 3 times a week that is still shown to have great benefit!  Brisk walking even counts for this.  Consider free youtube videos for exercise that fits your needs and lifestyle.     Monitor your caffeine and soda intake, try to minimize these beverages    Drink plenty of water (about 70-80 ounces a day)    Try to eat a vegetable and fruit  with lunch and dinner.  Have a breakfast that contains protein such as eggs or oatmeal.  Decrease your white bread, pasta, and sweets intake.  Increase lean proteins like chicken or pork. Try to eat out 1-2 times a week or less.  Monitor your portion sizes, try using smaller plates if needed.  Eat slowly, this gives you time to be aware that your body is full.   Let me know at any time if you would like a referral to a nutritionist!            Preventive Health Recommendations  Male Ages 40 to 49    Yearly exam:             See your health care provider every year in order to  o   Review health changes.   o   Discuss preventive care.    o   Review your medicines if your doctor has prescribed any.    You should be tested each year for STDs (sexually transmitted diseases) if you re at risk.     Have a cholesterol test every 5 years.     Have a colonoscopy (test " for colon cancer) if someone in your family has had colon cancer or polyps before age 50.     After age 45, have a diabetes test (fasting glucose). If you are at risk for diabetes, you should have this test every 3 years.      Talk with your health care provider about whether or not a prostate cancer screening test (PSA) is right for you.    Shots: Get a flu shot each year. Get a tetanus shot every 10 years.     Nutrition:    Eat at least 5 servings of fruits and vegetables daily.     Eat whole-grain bread, whole-wheat pasta and brown rice instead of white grains and rice.     Talk to your provider about Calcium and Vitamin D.     Lifestyle    Exercise for at least 150 minutes a week (30 minutes a day, 5 days a week). This will help you control your weight and prevent disease.     Limit alcohol to one drink per day.     No smoking.     Wear sunscreen to prevent skin cancer.     See your dentist every six months for an exam and cleaning.            Counseling Resources:  ATP IV Guidelines  Pooled Cohorts Equation Calculator  FRAX Risk Assessment  ICSI Preventive Guidelines  Dietary Guidelines for Americans, 2010  USDA's MyPlate  ASA Prophylaxis  Lung CA Screening    Cris Reed PA-C  [facility]

## 2018-05-07 ENCOUNTER — OFFICE VISIT (OUTPATIENT)
Dept: FAMILY MEDICINE | Facility: CLINIC | Age: 43
End: 2018-05-07
Payer: COMMERCIAL

## 2018-05-07 VITALS
BODY MASS INDEX: 31.4 KG/M2 | RESPIRATION RATE: 18 BRPM | OXYGEN SATURATION: 99 % | WEIGHT: 212 LBS | HEIGHT: 69 IN | HEART RATE: 104 BPM | TEMPERATURE: 98.4 F | DIASTOLIC BLOOD PRESSURE: 94 MMHG | SYSTOLIC BLOOD PRESSURE: 138 MMHG

## 2018-05-07 DIAGNOSIS — M54.42 CHRONIC BILATERAL LOW BACK PAIN WITH BILATERAL SCIATICA: ICD-10-CM

## 2018-05-07 DIAGNOSIS — Z12.5 SCREENING FOR PROSTATE CANCER: ICD-10-CM

## 2018-05-07 DIAGNOSIS — Z13.220 LIPID SCREENING: ICD-10-CM

## 2018-05-07 DIAGNOSIS — M54.41 CHRONIC BILATERAL LOW BACK PAIN WITH BILATERAL SCIATICA: ICD-10-CM

## 2018-05-07 DIAGNOSIS — I10 BENIGN ESSENTIAL HYPERTENSION: ICD-10-CM

## 2018-05-07 DIAGNOSIS — Z00.01 ENCOUNTER FOR ROUTINE ADULT HEALTH EXAMINATION WITH ABNORMAL FINDINGS: Primary | ICD-10-CM

## 2018-05-07 DIAGNOSIS — G89.29 CHRONIC BILATERAL LOW BACK PAIN WITH BILATERAL SCIATICA: ICD-10-CM

## 2018-05-07 DIAGNOSIS — Z13.1 SCREENING FOR DIABETES MELLITUS: ICD-10-CM

## 2018-05-07 LAB
ALBUMIN SERPL-MCNC: 3.9 G/DL (ref 3.4–5)
ALBUMIN UR-MCNC: NEGATIVE MG/DL
ALP SERPL-CCNC: 80 U/L (ref 40–150)
ALT SERPL W P-5'-P-CCNC: 34 U/L (ref 0–70)
ANION GAP SERPL CALCULATED.3IONS-SCNC: 8 MMOL/L (ref 3–14)
APPEARANCE UR: CLEAR
AST SERPL W P-5'-P-CCNC: 22 U/L (ref 0–45)
BASOPHILS # BLD AUTO: 0 10E9/L (ref 0–0.2)
BASOPHILS NFR BLD AUTO: 0.6 %
BILIRUB SERPL-MCNC: 0.4 MG/DL (ref 0.2–1.3)
BILIRUB UR QL STRIP: NEGATIVE
BUN SERPL-MCNC: 15 MG/DL (ref 7–30)
CALCIUM SERPL-MCNC: 9 MG/DL (ref 8.5–10.1)
CHLORIDE SERPL-SCNC: 106 MMOL/L (ref 94–109)
CHOLEST SERPL-MCNC: 228 MG/DL
CO2 SERPL-SCNC: 25 MMOL/L (ref 20–32)
COLOR UR AUTO: YELLOW
CREAT SERPL-MCNC: 0.76 MG/DL (ref 0.66–1.25)
DIFFERENTIAL METHOD BLD: NORMAL
EOSINOPHIL # BLD AUTO: 0.3 10E9/L (ref 0–0.7)
EOSINOPHIL NFR BLD AUTO: 4.1 %
ERYTHROCYTE [DISTWIDTH] IN BLOOD BY AUTOMATED COUNT: 12.8 % (ref 10–15)
GFR SERPL CREATININE-BSD FRML MDRD: >90 ML/MIN/1.7M2
GLUCOSE SERPL-MCNC: 112 MG/DL (ref 70–99)
GLUCOSE UR STRIP-MCNC: NEGATIVE MG/DL
HBA1C MFR BLD: 5.3 % (ref 0–5.6)
HCT VFR BLD AUTO: 45.8 % (ref 40–53)
HDLC SERPL-MCNC: 44 MG/DL
HGB BLD-MCNC: 16.2 G/DL (ref 13.3–17.7)
HGB UR QL STRIP: NEGATIVE
KETONES UR STRIP-MCNC: NEGATIVE MG/DL
LDLC SERPL CALC-MCNC: 155 MG/DL
LEUKOCYTE ESTERASE UR QL STRIP: NEGATIVE
LYMPHOCYTES # BLD AUTO: 1.8 10E9/L (ref 0.8–5.3)
LYMPHOCYTES NFR BLD AUTO: 28.6 %
MCH RBC QN AUTO: 30.2 PG (ref 26.5–33)
MCHC RBC AUTO-ENTMCNC: 35.4 G/DL (ref 31.5–36.5)
MCV RBC AUTO: 85 FL (ref 78–100)
MONOCYTES # BLD AUTO: 0.5 10E9/L (ref 0–1.3)
MONOCYTES NFR BLD AUTO: 7.8 %
NEUTROPHILS # BLD AUTO: 3.7 10E9/L (ref 1.6–8.3)
NEUTROPHILS NFR BLD AUTO: 58.9 %
NITRATE UR QL: NEGATIVE
NONHDLC SERPL-MCNC: 184 MG/DL
PH UR STRIP: 6 PH (ref 5–7)
PLATELET # BLD AUTO: 282 10E9/L (ref 150–450)
POTASSIUM SERPL-SCNC: 3.5 MMOL/L (ref 3.4–5.3)
PROT SERPL-MCNC: 7.8 G/DL (ref 6.8–8.8)
PSA SERPL-ACNC: 0.96 UG/L (ref 0–4)
RBC # BLD AUTO: 5.37 10E12/L (ref 4.4–5.9)
SODIUM SERPL-SCNC: 139 MMOL/L (ref 133–144)
SOURCE: NORMAL
SP GR UR STRIP: 1.02 (ref 1–1.03)
TRIGL SERPL-MCNC: 144 MG/DL
UROBILINOGEN UR STRIP-ACNC: 0.2 EU/DL (ref 0.2–1)
WBC # BLD AUTO: 6.3 10E9/L (ref 4–11)

## 2018-05-07 PROCEDURE — 85025 COMPLETE CBC W/AUTO DIFF WBC: CPT | Performed by: PHYSICIAN ASSISTANT

## 2018-05-07 PROCEDURE — 83036 HEMOGLOBIN GLYCOSYLATED A1C: CPT | Performed by: PHYSICIAN ASSISTANT

## 2018-05-07 PROCEDURE — 36415 COLL VENOUS BLD VENIPUNCTURE: CPT | Performed by: PHYSICIAN ASSISTANT

## 2018-05-07 PROCEDURE — 80061 LIPID PANEL: CPT | Performed by: PHYSICIAN ASSISTANT

## 2018-05-07 PROCEDURE — 80053 COMPREHEN METABOLIC PANEL: CPT | Performed by: PHYSICIAN ASSISTANT

## 2018-05-07 PROCEDURE — 99396 PREV VISIT EST AGE 40-64: CPT | Performed by: PHYSICIAN ASSISTANT

## 2018-05-07 PROCEDURE — G0103 PSA SCREENING: HCPCS | Performed by: PHYSICIAN ASSISTANT

## 2018-05-07 PROCEDURE — 81003 URINALYSIS AUTO W/O SCOPE: CPT | Performed by: PHYSICIAN ASSISTANT

## 2018-05-07 RX ORDER — LOSARTAN POTASSIUM 25 MG/1
12.5 TABLET ORAL DAILY
Qty: 15 TABLET | Refills: 1 | Status: SHIPPED | OUTPATIENT
Start: 2018-05-07 | End: 2018-06-27

## 2018-05-07 NOTE — LETTER
May 8, 2018    Rik PEÑA Given  [address] [facility] [address]        Dear Rik,     It was a pleasure to see you at your recent office visit.  Your test results are listed below.  psa is normal (prostate screening test). Blood sugar is elevated but improved from 2 years ago and a1c shows no diabetes so that is good. White and red blood cell counts normal.  No anemia.  Sodium, potassium, kidney and liver function normal.  Blood sugar normal, no diabetes.  Urine test is normal.  Cholesterol is elevated but improved from last time. I would recommend rechecking fasting labs in 1 year.           If you have any questions or concerns, please call the clinic at [phone].    Sincerely,  Cris Reed PA-C    Results for orders placed or performed in visit on 05/07/18   Comprehensive metabolic panel   Result Value Ref Range    Sodium 139 133 - 144 mmol/L    Potassium 3.5 3.4 - 5.3 mmol/L    Chloride 106 94 - 109 mmol/L    Carbon Dioxide 25 20 - 32 mmol/L    Anion Gap 8 3 - 14 mmol/L    Glucose 112 (H) 70 - 99 mg/dL    Urea Nitrogen 15 7 - 30 mg/dL    Creatinine 0.76 0.66 - 1.25 mg/dL    GFR Estimate >90 >60 mL/min/1.7m2    GFR Estimate If Black >90 >60 mL/min/1.7m2    Calcium 9.0 8.5 - 10.1 mg/dL    Bilirubin Total 0.4 0.2 - 1.3 mg/dL    Albumin 3.9 3.4 - 5.0 g/dL    Protein Total 7.8 6.8 - 8.8 g/dL    Alkaline Phosphatase 80 40 - 150 U/L    ALT 34 0 - 70 U/L    AST 22 0 - 45 U/L   Hemoglobin A1c   Result Value Ref Range    Hemoglobin A1C 5.3 0 - 5.6 %   CBC with platelets differential   Result Value Ref Range    WBC 6.3 4.0 - 11.0 10e9/L    RBC Count 5.37 4.4 - 5.9 10e12/L    Hemoglobin 16.2 13.3 - 17.7 g/dL    Hematocrit 45.8 40.0 - 53.0 %    MCV 85 78 - 100 fl    MCH 30.2 26.5 - 33.0 pg    MCHC 35.4 31.5 - 36.5 g/dL    RDW 12.8 10.0 - 15.0 %    Platelet Count 282 150 - 450 10e9/L    Diff Method Automated Method     % Neutrophils 58.9 %    % Lymphocytes 28.6 %    % Monocytes 7.8 %    % Eosinophils 4.1 %     % Basophils 0.6 %    Absolute Neutrophil 3.7 1.6 - 8.3 10e9/L    Absolute Lymphocytes 1.8 0.8 - 5.3 10e9/L    Absolute Monocytes 0.5 0.0 - 1.3 10e9/L    Absolute Eosinophils 0.3 0.0 - 0.7 10e9/L    Absolute Basophils 0.0 0.0 - 0.2 10e9/L   PSA, screen   Result Value Ref Range    PSA 0.96 0 - 4 ug/L   Lipid panel reflex to direct LDL Fasting   Result Value Ref Range    Cholesterol 228 (H) <200 mg/dL    Triglycerides 144 <150 mg/dL    HDL Cholesterol 44 >39 mg/dL    LDL Cholesterol Calculated 155 (H) <100 mg/dL    Non HDL Cholesterol 184 (H) <130 mg/dL   UA reflex to Microscopic   Result Value Ref Range    Color Urine Yellow     Appearance Urine Clear     Glucose Urine Negative NEG^Negative mg/dL    Bilirubin Urine Negative NEG^Negative    Ketones Urine Negative NEG^Negative mg/dL    Specific Gravity Urine 1.020 1.003 - 1.035    Blood Urine Negative NEG^Negative    pH Urine 6.0 5.0 - 7.0 pH    Protein Albumin Urine Negative NEG^Negative mg/dL    Urobilinogen Urine 0.2 0.2 - 1.0 EU/dL    Nitrite Urine Negative NEG^Negative    Leukocyte Esterase Urine Negative NEG^Negative    Source Midstream Urine

## 2018-05-07 NOTE — MR AVS SNAPSHOT
After Visit Summary   5/7/2018    Rik Lyons    MRN: [id number]           Patient Information     Date Of Birth          1975        Visit Information        Provider Department      5/7/2018 7:40 AM Cris Reed PA-C [facility]        Today's Diagnoses     Screening for diabetes mellitus    -  1    Benign essential hypertension        Chronic bilateral low back pain with bilateral sciatica        Lipid screening        Screening for prostate cancer          Care Instructions    Lifestyle recommendations:  Being overweight or obese puts you are risk of major health problems including but not limited to: heart disease/heart attack, stroke, high cholesterol, high blood pressure, and diabetes.  This is why it is important to be at a healthy weight for your height.     Exercise 30 minutes 3-5 times a week, if you can only do 10 minutes 3 times a week that is still shown to have great benefit!  Brisk walking even counts for this.  Consider free youtube videos for exercise that fits your needs and lifestyle.     Monitor your caffeine and soda intake, try to minimize these beverages    Drink plenty of water (about 70-80 ounces a day)    Try to eat a vegetable and fruit  with lunch and dinner.  Have a breakfast that contains protein such as eggs or oatmeal.  Decrease your white bread, pasta, and sweets intake.  Increase lean proteins like chicken or pork. Try to eat out 1-2 times a week or less.  Monitor your portion sizes, try using smaller plates if needed.  Eat slowly, this gives you time to be aware that your body is full.   Let me know at any time if you would like a referral to a nutritionist!            Preventive Health Recommendations  Male Ages 40 to 49    Yearly exam:             See your health care provider every year in order to  o   Review health changes.   o   Discuss preventive care.    o   Review your medicines if your doctor has prescribed any.    You  should be tested each year for STDs (sexually transmitted diseases) if you re at risk.     Have a cholesterol test every 5 years.     Have a colonoscopy (test for colon cancer) if someone in your family has had colon cancer or polyps before age 50.     After age 45, have a diabetes test (fasting glucose). If you are at risk for diabetes, you should have this test every 3 years.      Talk with your health care provider about whether or not a prostate cancer screening test (PSA) is right for you.    Shots: Get a flu shot each year. Get a tetanus shot every 10 years.     Nutrition:    Eat at least 5 servings of fruits and vegetables daily.     Eat whole-grain bread, whole-wheat pasta and brown rice instead of white grains and rice.     Talk to your provider about Calcium and Vitamin D.     Lifestyle    Exercise for at least 150 minutes a week (30 minutes a day, 5 days a week). This will help you control your weight and prevent disease.     Limit alcohol to one drink per day.     No smoking.     Wear sunscreen to prevent skin cancer.     See your dentist every six months for an exam and cleaning.              Follow-ups after your visit        Who to contact     If you have questions or need follow up information about today's clinic visit or your schedule please contact [facility] directly at [phone].  Normal or non-critical lab and imaging results will be communicated to you by MyChart, letter or phone within 4 business days after the clinic has received the results. If you do not hear from us within 7 days, please contact the clinic through MyChart or phone. If you have a critical or abnormal lab result, we will notify you by phone as soon as possible.  Submit refill requests through [facility] or call your pharmacy and they will forward the refill request to us. Please allow 3 business days for your refill to be completed.          Additional Information About Your Visit        Care EveryWhere ID      "This is your Care EveryWhere ID. This could be used by other organizations to access your [facility] medical records  [id number]        Your Vitals Were     Pulse Temperature Respirations Height Pulse Oximetry BMI (Body Mass Index)    104 98.4  F (36.9  C) (Oral) 18 5' 9\" (1.753 m) 99% 31.31 kg/m2       Blood Pressure from Last 3 Encounters:   05/07/18 (!) 138/94   12/21/17 (!) 162/108   12/05/17 135/88    Weight from Last 3 Encounters:   05/07/18 212 lb (96.2 kg)   12/05/17 214 lb (97.1 kg)   10/30/17 218 lb (98.9 kg)              We Performed the Following     CBC with platelets differential     Comprehensive metabolic panel     Hemoglobin A1c     Lipid panel reflex to direct LDL Fasting     PSA, screen     UA reflex to Microscopic          Today's Medication Changes          These changes are accurate as of 5/7/18  8:19 AM.  If you have any questions, ask your nurse or doctor.               Start taking these medicines.        Dose/Directions    losartan 25 MG tablet   Commonly known as:  COZAAR   Used for:  Benign essential hypertension   Started by:  Cris Reed PA-C        Dose:  12.5 mg   Take 0.5 tablets (12.5 mg) by mouth daily   Quantity:  15 tablet   Refills:  1            Where to get your medicines      [facility] [address] [place] [address] [address] [facility] [address]  [address] [facility] [address]     Phone:  [phone]     losartan 25 MG tablet    nabumetone 750 MG tablet                Primary Care Provider Office Phone # Fax #    Darrell Jones [phone] [address] [facility] [address]        Equal Access to Services     LOU DICKSON : Rhiannon Carty, leti lusiobhanadaha, qablancata kaalmada adejohnnyyada, kiki martin. So [facility] [phone].    ATENCIÓN: Si habla español, tiene a mcginnis disposición servicios gratuitos de asistencia " lingüística. Roxy al [phone].    We comply with applicable federal civil rights laws and [place] laws. We do not discriminate on the basis of race, color, national origin, age, disability, sex, sexual orientation, or gender identity.            Thank you!     Thank you for [facility]  [facility]. Our goal is always to provide you with excellent care. Hearing back from our patients is one way we can continue to improve our services. Please take a few minutes to complete the written survey that you may receive in the mail after your visit with us. Thank you!             Your Updated Medication List - Protect others around you: Learn how to safely use, store and throw away your medicines at www.disposemymeds.org.          This list is accurate as of 5/7/18  8:19 AM.  Always use your most recent med list.                   Brand Name Dispense Instructions for use Diagnosis    CLARITIN PO           losartan 25 MG tablet    COZAAR    15 tablet    Take 0.5 tablets (12.5 mg) by mouth daily    Benign essential hypertension       nabumetone 750 MG tablet    RELAFEN    60 tablet    1 tablet twice a day for 10 days and then as needed    Chronic bilateral low back pain with bilateral sciatica

## 2018-05-07 NOTE — PROGRESS NOTES
Dear Rik,      It was a pleasure to see you at your recent office visit.  Your test results are listed below.  psa is normal (prostate screening test). Blood sugar is elevated but improved from 2 years ago and a1c shows no diabetes so that is good. White and red blood cell counts normal.  No anemia.  Sodium, potassium, kidney and liver function normal.  Blood sugar normal, no diabetes.  Urine test is normal.  Cholesterol is elevated but improved from last time. I would recommend rechecking fasting labs in 1 year.           If you have any questions or concerns, please call the clinic at [phone].    Sincerely,  Cris Reed PA-C

## 2018-05-07 NOTE — NURSING NOTE
"Chief Complaint   Patient presents with     Physical     JOSEPH     Hypertension     BP check     Health Maintenance     HIV and TDAP       Initial BP (!) 169/95  Pulse 104  Temp 98.4  F (36.9  C) (Oral)  Resp 18  Ht 5' 9\" (1.753 m)  Wt 212 lb (96.2 kg)  SpO2 99%  BMI 31.31 kg/m2 Estimated body mass index is 31.31 kg/(m^2) as calculated from the following:    Height as of this encounter: 5' 9\" (1.753 m).    Weight as of this encounter: 212 lb (96.2 kg).  Medication Reconciliation: complete      Eduarda Lerner MA    "

## 2018-06-27 ENCOUNTER — TELEPHONE (OUTPATIENT)
Dept: PALLIATIVE MEDICINE | Facility: CLINIC | Age: 43
End: 2018-06-27

## 2018-06-27 ENCOUNTER — OFFICE VISIT (OUTPATIENT)
Dept: FAMILY MEDICINE | Facility: CLINIC | Age: 43
End: 2018-06-27
Payer: COMMERCIAL

## 2018-06-27 VITALS
TEMPERATURE: 97.1 F | SYSTOLIC BLOOD PRESSURE: 135 MMHG | BODY MASS INDEX: 32.29 KG/M2 | HEART RATE: 89 BPM | WEIGHT: 218 LBS | HEIGHT: 69 IN | OXYGEN SATURATION: 98 % | RESPIRATION RATE: 18 BRPM | DIASTOLIC BLOOD PRESSURE: 89 MMHG

## 2018-06-27 DIAGNOSIS — M54.41 CHRONIC BILATERAL LOW BACK PAIN WITH BILATERAL SCIATICA: Primary | ICD-10-CM

## 2018-06-27 DIAGNOSIS — G89.29 CHRONIC BILATERAL LOW BACK PAIN WITH BILATERAL SCIATICA: Primary | ICD-10-CM

## 2018-06-27 DIAGNOSIS — I10 BENIGN ESSENTIAL HYPERTENSION: ICD-10-CM

## 2018-06-27 DIAGNOSIS — M54.42 CHRONIC BILATERAL LOW BACK PAIN WITH BILATERAL SCIATICA: Primary | ICD-10-CM

## 2018-06-27 LAB
ANION GAP SERPL CALCULATED.3IONS-SCNC: 10 MMOL/L (ref 3–14)
BUN SERPL-MCNC: 20 MG/DL (ref 7–30)
CALCIUM SERPL-MCNC: 9.5 MG/DL (ref 8.5–10.1)
CHLORIDE SERPL-SCNC: 105 MMOL/L (ref 94–109)
CO2 SERPL-SCNC: 24 MMOL/L (ref 20–32)
CREAT SERPL-MCNC: 0.82 MG/DL (ref 0.66–1.25)
GFR SERPL CREATININE-BSD FRML MDRD: >90 ML/MIN/1.7M2
GLUCOSE SERPL-MCNC: 102 MG/DL (ref 70–99)
POTASSIUM SERPL-SCNC: 3.6 MMOL/L (ref 3.4–5.3)
SODIUM SERPL-SCNC: 139 MMOL/L (ref 133–144)

## 2018-06-27 PROCEDURE — 99213 OFFICE O/P EST LOW 20 MIN: CPT | Performed by: PHYSICIAN ASSISTANT

## 2018-06-27 PROCEDURE — 80048 BASIC METABOLIC PNL TOTAL CA: CPT | Performed by: PHYSICIAN ASSISTANT

## 2018-06-27 PROCEDURE — 36415 COLL VENOUS BLD VENIPUNCTURE: CPT | Performed by: PHYSICIAN ASSISTANT

## 2018-06-27 RX ORDER — LOSARTAN POTASSIUM 25 MG/1
12.5 TABLET ORAL DAILY
Qty: 15 TABLET | Refills: 2 | Status: SHIPPED | OUTPATIENT
Start: 2018-06-27 | End: 2018-12-26

## 2018-06-27 ASSESSMENT — PAIN SCALES - GENERAL: PAINLEVEL: MODERATE PAIN (5)

## 2018-06-27 ASSESSMENT — ANXIETY QUESTIONNAIRES
GAD7 TOTAL SCORE: 1
3. WORRYING TOO MUCH ABOUT DIFFERENT THINGS: NOT AT ALL
5. BEING SO RESTLESS THAT IT IS HARD TO SIT STILL: NOT AT ALL
6. BECOMING EASILY ANNOYED OR IRRITABLE: NOT AT ALL
2. NOT BEING ABLE TO STOP OR CONTROL WORRYING: NOT AT ALL
7. FEELING AFRAID AS IF SOMETHING AWFUL MIGHT HAPPEN: NOT AT ALL
1. FEELING NERVOUS, ANXIOUS, OR ON EDGE: SEVERAL DAYS
IF YOU CHECKED OFF ANY PROBLEMS ON THIS QUESTIONNAIRE, HOW DIFFICULT HAVE THESE PROBLEMS MADE IT FOR YOU TO DO YOUR WORK, TAKE CARE OF THINGS AT HOME, OR GET ALONG WITH OTHER PEOPLE: NOT DIFFICULT AT ALL

## 2018-06-27 ASSESSMENT — PATIENT HEALTH QUESTIONNAIRE - PHQ9: 5. POOR APPETITE OR OVEREATING: NOT AT ALL

## 2018-06-27 NOTE — PROGRESS NOTES
SUBJECTIVE:                                                    Rik Lyons is a 42 year old male who presents to clinic today for the following health issues:    Pt here following up chronic back pain - has been seeing pain management  - doing injections which worked great for him. Pt here to get referral for next injection. Pt wondering why he has to come into clinic and if he will continue to have to come in for this as he was told that he could get an injection done every 6 months  Pain is slowing better     Hypertension Follow-up      Outpatient blood pressures are not being checked.    Low Salt Diet: no added salt  No chest pain or short of breath. No concerns.        Problem list and histories reviewed & adjusted, as indicated.  Additional history: as documented      Patient Active Problem List   Diagnosis     Headache     Cervicalgia     Benign essential hypertension     Chronic bilateral low back pain with bilateral sciatica     Past Surgical History:   Procedure Laterality Date     APPENDECTOMY         Social History   Substance Use Topics     Smoking status: Former Smoker     Types: Cigarettes     Smokeless tobacco: Never Used     Alcohol use Yes      Comment: Occ     Family History   Problem Relation Age of Onset     Other Cancer Mother      Coronary Artery Disease Father      age 53     Diabetes Father          Current Outpatient Prescriptions   Medication Sig Dispense Refill     Loratadine (CLARITIN PO)        losartan (COZAAR) 25 MG tablet Take 0.5 tablets (12.5 mg) by mouth daily 15 tablet 2     nabumetone (RELAFEN) 750 MG tablet 1 tablet twice a day for 10 days and then as needed 60 tablet 1     [DISCONTINUED] losartan (COZAAR) 25 MG tablet Take 0.5 tablets (12.5 mg) by mouth daily 15 tablet 1     No Known Allergies  BP Readings from Last 3 Encounters:   06/27/18 135/89   05/07/18 (!) 138/94   12/21/17 (!) 162/108    Wt Readings from Last 3 Encounters:   06/27/18 218 lb (98.9 kg)   05/07/18 212 lb  "(96.2 kg)   12/05/17 214 lb (97.1 kg)                  Labs reviewed in EPIC    ROS:  Constitutional, HEENT, cardiovascular, pulmonary, gi and gu systems are negative, except as otherwise noted.    OBJECTIVE:     /89  Pulse 89  Temp 97.1  F (36.2  C) (Oral)  Resp 18  Ht 5' 9\" (1.753 m)  Wt 218 lb (98.9 kg)  SpO2 98%  BMI 32.19 kg/m2  Body mass index is 32.19 kg/(m^2).  GENERAL: healthy, alert and no distress  RESP: lungs clear to auscultation - no rales, rhonchi or wheezes  CV: regular rate and rhythm, normal S1 S2, no S3 or S4, no murmur, click or rub, no peripheral edema and peripheral pulses strong  MS: no gross musculoskeletal defects noted, no edema  No back tenderness. 5/5 bernard lower extremities strength. positive left SLR.   Calves soft non-tender Neurovascularly Intact Distally.       Diagnostic Test Results:  No results found for this or any previous visit (from the past 24 hour(s)).    ASSESSMENT/PLAN:         ICD-10-CM    1. Chronic bilateral low back pain with bilateral sciatica M54.42 PAIN MANAGEMENT REFERRAL    M54.41 JOB PT, HAND, AND CHIROPRACTIC REFERRAL    G89.29    2. Benign essential hypertension I10 losartan (COZAAR) 25 MG tablet     Basic metabolic panel   1. Ok for epidural. Follow up  Yearly. Ok for PHYSICAL THERAPY  2. Stable, labs pending. Recheck blood pressure in 6 months.     Ankit Schulte PA-C  [facility]    "

## 2018-06-27 NOTE — TELEPHONE ENCOUNTER
Pre-screening Questions for Radiology Injections:    Injection to be done at which interventional clinic site? [facility] [place]   If [place], instruct patient to arrive 30 minutes before the scheduled appointment time.     Procedure ordered by OPPEL    Procedure ordered? Lumbar Epidural Steroid Injection    What insurance would patient like us to bill for this procedure? BCBS      Worker's comp or MVA (motor vehicle accident) -Any injection DO NOT SCHEDULE and route to Denise Nassar.      [employer] - For SI joint injections, DO NOT SCHEDULE and route Poornima Reyes. MeMeMe NO PA REQUIRED EFFECTIVE 11/1/2017      [employer]- MBB's must be scheduled at LEAST two weeks apart      [employer] - Any injection besides hip/shoulder/knee joint DO NOT SCHEDULE and route to Poornima Reyes. She will obtain PA and call pt back to schedule procedure or notify pt of denial.        [employer]-Route to Poornima for review    Any chance of pregnancy? Not Applicable   If YES, do NOT schedule and route to RN pool    Is an  needed? No     Patient has a drive home? (mandatory) YES:     Is patient taking any blood thinners (plavix, coumadin, jantoven, warfarin, heparin, pradaxa or dabigatran )? No   If hold needed, do NOT schedule, route to RN pool     Is patient taking any aspirin products? No     If more than 325mg/day do NOT schedule; route to RN pool     For CERVICAL procedures, hold all aspirin products for 6 days.      Does the patient have a bleeding or clotting disorder? No     If YES, okay to schedule AND route to RN nurse pool    **For any patients with platelet count <100, must be forwarded to provider**    Is patient diabetic?  No  If YES, have them bring their glucometer.    Does patient have an active infection or treated for one within the past week? No     Is patient currently taking any antibiotics?  No     For patients on chronic, preventative, or prophylactic  antibiotics, procedures may be scheduled.     For patients on antibiotics for active or recent infection:    Conchita Morgan Burton, Snitzer-antibiotic course must have been completed for 4 days    Is patient currently taking any steroid medications? (i.e. Prednisone, Medrol)  No     For patients on steroid medications:    Conchita Morgan Burton, Snitzer-steroid course must have been completed for 4 days    Reviewed with patient:  If you are started on any steroids or antibiotics between now and your appointment, you must contact us because it may affect our ability to perform your procedure.  Yes    Is patient actively being treated for cancer or immunocompromised? No  If YES, do NOT schedule and route to RN pool     Are you able to get on and off an exam table with minimal or no assistance? Yes  If NO, do NOT schedule and route to RN pool    Are you able to roll over and lay on your stomach with minimal or no assistance? Yes  If NO, do NOT schedule and route to RN pool     Any allergies to contrast dye, iodine, shellfish, or numbing and steroid medications? No  If YES, route to RN pool AND add allergy information to appointment notes    Allergies: Review of patient's allergies indicates no known allergies.      Has the patient had a flu shot or any other vaccinations within 7 days before or after the procedure.  No     Does patient have an MRI/CT?  YES:   (SI joint, hip injections, lumbar sympathetic blocks, and stellate ganglion blocks do not require an MRI)    Was the MRI done w/in the last 3 years?  Yes    Was MRI done at [place]? Yes      If not, where was it done? N/A       If MRI was not done at [place], [facility] do NOT schedule and route to nursing.  If pt has an imaging disc, the injection may be scheduled but pt has to bring disc to appt. If they show up w/out disc the injection cannot be done    Reminders (please tell patient if applicable):       Instructed pt to  arrive 30 minutes early for IV start if this is for a cervical procedure, ALL sympathetic (stellate ganglion, hypogastric, or lumbar sympathetic block) and all sedation procedures (RFA, spinal cord stimulation trials).  Not Applicable   -IVs are not routinely placed for Dr. Sutherland cervical cases   -Dr. Chan: IVs for cervical ESIs and cervical TBDs (not CMBBs/facet inj)      If NPO for sedation, informed patient that it is okay to take medications with sips of water (except if they are to hold blood thinners).  Not Applicable   *DO take blood pressure medication if it is prescribed*      If this is for a cervical ERICKA, informed patient that aspirin needs to be held for 6 days.   Not Applicable      For all patients not having spinal cord stimulator (SCS) trials or radiofrequency ablations (RFAs), informed patient:    IV sedation is not provided for this procedure.  If you feel that an oral anti-anxiety medication is needed, you can discuss this further with your referring provider or primary care provider.  The Pain Clinic provider will discuss specifics of what the procedure includes at your appointment.  Most procedures last 10-20 minutes.  We use numbing medications to help with any discomfort during the procedure.  Not Applicable      Do not schedule procedures requiring IV placement in the first appointment of the day or first appointment after lunch. Do NOT schedule at 0745, 0815 or 1245.       For patients 85 or older we recommend having an adult stay w/ them for the remainder of the day.       Does the patient have any questions?    Poornima Reyes  [facility]

## 2018-06-27 NOTE — LETTER
June 28, 2018    Rik Lyons  [address] [facility] [address]    Mr. Lyons,     All of your labs were normal for you.     Please contact the clinic if you have additional questions.  Thank you.     Sincerely,     Ankit Schulte PA-C/dario    Results for orders placed or performed in visit on 06/27/18   Basic metabolic panel   Result Value Ref Range    Sodium 139 133 - 144 mmol/L    Potassium 3.6 3.4 - 5.3 mmol/L    Chloride 105 94 - 109 mmol/L    Carbon Dioxide 24 20 - 32 mmol/L    Anion Gap 10 3 - 14 mmol/L    Glucose 102 (H) 70 - 99 mg/dL    Urea Nitrogen 20 7 - 30 mg/dL    Creatinine 0.82 0.66 - 1.25 mg/dL    GFR Estimate >90 >60 mL/min/1.7m2    GFR Estimate If Black >90 >60 mL/min/1.7m2    Calcium 9.5 8.5 - 10.1 mg/dL

## 2018-06-27 NOTE — NURSING NOTE
"Chief Complaint   Patient presents with     Back Pain     Health Maintenance     LUIS ANTONIO, PHQ, HIV       Initial /89  Pulse 89  Temp 97.1  F (36.2  C) (Oral)  Resp 18  Ht 5' 9\" (1.753 m)  Wt 218 lb (98.9 kg)  SpO2 98%  BMI 32.19 kg/m2 Estimated body mass index is 32.19 kg/(m^2) as calculated from the following:    Height as of this encounter: 5' 9\" (1.753 m).    Weight as of this encounter: 218 lb (98.9 kg).  Medication Reconciliation: complete  Louisa Charles CMA    "

## 2018-06-27 NOTE — MR AVS SNAPSHOT
After Visit Summary   6/27/2018    Rik Lyons    MRN: [id number]           Patient Information     Date Of Birth          1975        Visit Information        Provider Department      6/27/2018 2:10 PM Ankit Schulte PA-C [facility]        Today's Diagnoses     Chronic bilateral low back pain with bilateral sciatica    -  1       Follow-ups after your visit        Additional Services     PAIN MANAGEMENT REFERRAL       Your provider has referred you to: [facility]: [facility] -    Reason for Referral: Procedure Order Epidural:  Lumbar (Advanced imaging required in the last 3 years)  Same as in the past.     What is your diagnosis for the patient's pain? Lumbar radiculopathy      For any questions, contact the [facility] at [phone].     **ANY DIAGNOSTIC TESTS THAT ARE NOT IN EPIC SHOULD BE SENT TO THE PAIN CENTER**    REGARDING OPIOID MEDICATIONS:  The discussion of opioids management, appropriateness of therapy, and dosing will be discussed in patients being seen for evaluation.  The pain management clinics are not long-term prescribing clinics, with transition of prescribing of medications ultimately going back to the referring provider/PCP.  If prescribing is taken over at the pain clinic, it is in actively involved patients whom are appropriate for opioids, urine drug screening is completed, and long-term prescribing plan has been determined.  Therefore, we will not be automatically taking over prescribing at the patient's first visit.  Is this agreeable to you? agrees.     Please be aware that coverage of these services is subject to the terms and limitations of your health insurance plan.  Call member services at your health plan with any benefit or coverage questions.      Please bring the following with you to your appointment:    (1) Any X-Rays, CTs or MRIs which have been performed.  Contact the facility where they were  "done to arrange for  prior to your scheduled appointment.    (2) List of current medications   (3) This referral request   (4) Any documents/labs given to you for this referral                  Who to contact     If you have questions or need follow up information about today's clinic visit or your schedule please contact [facility] directly at [phone].  Normal or non-critical lab and imaging results will be communicated to you by MyChart, letter or phone within 4 business days after the clinic has received the results. If you do not hear from us within 7 days, please contact the clinic through MyChart or phone. If you have a critical or abnormal lab result, we will notify you by phone as soon as possible.  Submit refill requests through [employer] or call your pharmacy and they will forward the refill request to us. Please allow 3 business days for your refill to be completed.          Additional Information About Your Visit        Care EveryWhere ID     This is your Care EveryWhere ID. This could be used by other organizations to access your [facility] medical records  [id number]        Your Vitals Were     Pulse Temperature Respirations Height Pulse Oximetry BMI (Body Mass Index)    89 97.1  F (36.2  C) (Oral) 18 5' 9\" (1.753 m) 98% 32.19 kg/m2       Blood Pressure from Last 3 Encounters:   06/27/18 135/89   05/07/18 (!) 138/94   12/21/17 (!) 162/108    Weight from Last 3 Encounters:   06/27/18 218 lb (98.9 kg)   05/07/18 212 lb (96.2 kg)   12/05/17 214 lb (97.1 kg)              We Performed the Following     PAIN MANAGEMENT REFERRAL        Primary Care Provider Office Phone # Fax #    Darrell Jones [phone] [address] [address] [facility] [address]        Equal Access to Services     LOU DICKSON : Rhiannon Carty, leti ridley, kiki briceño. So [facility] [phone].    ATENCIÓN: Si habla " español, tiene a mcginnis disposición servicios gratuitos de asistencia lingüística. Roxy giles [phone].    We comply with applicable federal civil rights laws and [place] laws. We do not discriminate on the basis of race, color, national origin, age, disability, sex, sexual orientation, or gender identity.            Thank you!     Thank you for choosing [facility]  [facility]. Our goal is always to provide you with excellent care. Hearing back from our patients is one way we can continue to improve our services. Please take a few minutes to complete the written survey that you may receive in the mail after your visit with us. Thank you!             Your Updated Medication List - Protect others around you: Learn how to safely use, store and throw away your medicines at www.disposemymeds.org.          This list is accurate as of 6/27/18  2:30 PM.  Always use your most recent med list.                   Brand Name Dispense Instructions for use Diagnosis    CLARITIN PO           losartan 25 MG tablet    COZAAR    15 tablet    Take 0.5 tablets (12.5 mg) by mouth daily    Benign essential hypertension       nabumetone 750 MG tablet    RELAFEN    60 tablet    1 tablet twice a day for 10 days and then as needed    Chronic bilateral low back pain with bilateral sciatica

## 2018-06-27 NOTE — PROGRESS NOTES
"SUBJECTIVE:                                                    Rik Lyons is a 42 year old male who presents to clinic today for the following health issues:    Back Pain      Duration: ***        Specific cause: {.:077815::\"none\"}    Description:   Location of pain: {.:550032}  Character of pain: {.:954108}  Pain radiation:{.:002995::\"none\"}  New numbness or weakness in legs, not attributed to pain:  { :983664::\"no\"}    Intensity: {.:083316::\"Currently ***/10\"}    History:   Pain interferes with job: {.:725711::\"***\"}  History of back problems: {.:165108::\"no prior back problems\"}  Any previous MRI or X-rays: {.:017003::\"None\"}  Sees a specialist for back pain:  { :907087::\"No\"}  Therapies tried without relief: {.:916088}    Alleviating factors:   Improved by: {.:930955}      Precipitating factors:  Worsened by: {.:519797::\"Nothing\"}          Problem list and histories reviewed & adjusted, as indicated.  Additional history: as documented    Patient Active Problem List   Diagnosis     Headache     Cervicalgia     Benign essential hypertension     Chronic bilateral low back pain with bilateral sciatica     Past Surgical History:   Procedure Laterality Date     APPENDECTOMY         Social History   Substance Use Topics     Smoking status: Former Smoker     Types: Cigarettes     Smokeless tobacco: Never Used     Alcohol use Yes      Comment: Occ     Family History   Problem Relation Age of Onset     Other Cancer Mother      Coronary Artery Disease Father      age 53     Diabetes Father          Current Outpatient Prescriptions   Medication Sig Dispense Refill     Loratadine (CLARITIN PO)        losartan (COZAAR) 25 MG tablet Take 0.5 tablets (12.5 mg) by mouth daily 15 tablet 1     nabumetone (RELAFEN) 750 MG tablet 1 tablet twice a day for 10 days and then as needed 60 tablet 1     No Known Allergies  Problem list, Medication list, Allergies, and Medical/Social/Surgical histories reviewed in EPIC and updated as " "appropriate.    ROS:  {ROS - brief:534330::\"C: NEGATIVE for fever, chills, change in weight\",\"E/M: NEGATIVE for ear, mouth and throat problems\",\"R: NEGATIVE for significant cough or SOB\",\"CV: NEGATIVE for chest pain, palpitations or peripheral edema\"}    OBJECTIVE:                                                    There were no vitals taken for this visit.  There is no height or weight on file to calculate BMI.   GENERAL: healthy, alert, well nourished, well hydrated, no distress  RESP: lungs clear to auscultation - no rales, no rhonchi, no wheezes  CV: regular rates and rhythm, normal S1 S2, no S3 or S4 and no murmur, no click or rub -  ABDOMEN: soft, no tenderness, no  hepatosplenomegaly, no masses, normal bowel sounds  Lumber/Thoracic Spine Exam: Tender:  {THORACIC/LUMBAR SPINE TENDERNESS:909444}  Non-tender:  {THORACIC/LUMBAR SPINE TENDERNESS:388442}  Range of Motion:  full range of motion bernard lower extremities   Strength:  5/5 bernard lower extremities   Special tests:  {FSOC THORACIC/LUMBAR SPINE SPECIAL TESTS:469442}  Hip Exam: {FSOC SHORT HIP:381584}    {Diagnostic Test Results:961985}     ASSESSMENT/PLAN:                                                    No diagnosis found.    {FOLLOW UP CLINIC OPTIONS:462244}    Ankit Schulte PA-C  [facility]  "

## 2018-06-28 ASSESSMENT — PATIENT HEALTH QUESTIONNAIRE - PHQ9: SUM OF ALL RESPONSES TO PHQ QUESTIONS 1-9: 2

## 2018-06-28 ASSESSMENT — ANXIETY QUESTIONNAIRES: GAD7 TOTAL SCORE: 1

## 2018-06-28 NOTE — PROGRESS NOTES
Mr. Given,    All of your labs were normal for you.    Please contact the clinic if you have additional questions.  Thank you.    Sincerely,    Ankit Schulte PA-C

## 2018-07-13 ENCOUNTER — HOSPITAL ENCOUNTER (OUTPATIENT)
Dept: RADIOLOGY | Facility: CLINIC | Age: 43
Discharge: HOME | End: 2018-07-13
Attending: ANESTHESIOLOGY | Admitting: ANESTHESIOLOGY
Payer: COMMERCIAL

## 2018-07-13 ENCOUNTER — RADIOLOGY INJECTION OFFICE VISIT (OUTPATIENT)
Dept: PALLIATIVE MEDICINE | Facility: CLINIC | Age: 43
End: 2018-07-13
Payer: COMMERCIAL

## 2018-07-13 VITALS — SYSTOLIC BLOOD PRESSURE: 149 MMHG | DIASTOLIC BLOOD PRESSURE: 101 MMHG | RESPIRATION RATE: 15 BRPM | HEART RATE: 73 BPM

## 2018-07-13 DIAGNOSIS — M54.16 LUMBAR RADICULOPATHY: ICD-10-CM

## 2018-07-13 DIAGNOSIS — G89.29 CHRONIC BILATERAL LOW BACK PAIN WITH BILATERAL SCIATICA: ICD-10-CM

## 2018-07-13 DIAGNOSIS — M51.26 LUMBAR DISC HERNIATION: ICD-10-CM

## 2018-07-13 DIAGNOSIS — M54.16 LUMBAR RADICULOPATHY: Primary | ICD-10-CM

## 2018-07-13 DIAGNOSIS — M51.369 DDD (DEGENERATIVE DISC DISEASE), LUMBAR: ICD-10-CM

## 2018-07-13 DIAGNOSIS — M54.42 CHRONIC BILATERAL LOW BACK PAIN WITH BILATERAL SCIATICA: ICD-10-CM

## 2018-07-13 DIAGNOSIS — M54.41 CHRONIC BILATERAL LOW BACK PAIN WITH BILATERAL SCIATICA: ICD-10-CM

## 2018-07-13 PROCEDURE — 25000128 H RX IP 250 OP 636: Performed by: ANESTHESIOLOGY

## 2018-07-13 PROCEDURE — 27210202 XR LUMBAR EPIDURAL INJECTION INCL IMAGING: Mod: TC

## 2018-07-13 PROCEDURE — 62323 NJX INTERLAMINAR LMBR/SAC: CPT | Performed by: ANESTHESIOLOGY

## 2018-07-13 PROCEDURE — 25000125 ZZHC RX 250: Performed by: ANESTHESIOLOGY

## 2018-07-13 RX ORDER — BUPIVACAINE HYDROCHLORIDE 5 MG/ML
10 INJECTION, SOLUTION PERINEURAL ONCE
Status: COMPLETED | OUTPATIENT
Start: 2018-07-13 | End: 2018-07-13

## 2018-07-13 RX ORDER — METHYLPREDNISOLONE ACETATE 40 MG/ML
40 INJECTION, SUSPENSION INTRA-ARTICULAR; INTRALESIONAL; INTRAMUSCULAR; SOFT TISSUE ONCE
Status: COMPLETED | OUTPATIENT
Start: 2018-07-13 | End: 2018-07-13

## 2018-07-13 RX ORDER — LIDOCAINE HYDROCHLORIDE 10 MG/ML
5 INJECTION, SOLUTION EPIDURAL; INFILTRATION; INTRACAUDAL; PERINEURAL ONCE
Status: COMPLETED | OUTPATIENT
Start: 2018-07-13 | End: 2018-07-13

## 2018-07-13 RX ORDER — LIDOCAINE HYDROCHLORIDE AND EPINEPHRINE 10; 10 MG/ML; UG/ML
1 INJECTION, SOLUTION INFILTRATION; PERINEURAL ONCE
Status: COMPLETED | OUTPATIENT
Start: 2018-07-13 | End: 2018-07-13

## 2018-07-13 RX ORDER — DEXAMETHASONE SODIUM PHOSPHATE 10 MG/ML
20 INJECTION, SOLUTION INTRAMUSCULAR; INTRAVENOUS ONCE
Status: COMPLETED | OUTPATIENT
Start: 2018-07-13 | End: 2018-07-13

## 2018-07-13 RX ORDER — IOPAMIDOL 612 MG/ML
3 INJECTION, SOLUTION INTRATHECAL ONCE
Status: COMPLETED | OUTPATIENT
Start: 2018-07-13 | End: 2018-07-13

## 2018-07-13 RX ADMIN — IOPAMIDOL 1.5 ML: 612 INJECTION, SOLUTION INTRATHECAL at 08:41

## 2018-07-13 RX ADMIN — BUPIVACAINE HYDROCHLORIDE 1 ML: 5 INJECTION, SOLUTION EPIDURAL; INTRACAUDAL at 08:40

## 2018-07-13 RX ADMIN — LIDOCAINE HYDROCHLORIDE 2.5 ML: 10 INJECTION, SOLUTION EPIDURAL; INFILTRATION; INTRACAUDAL; PERINEURAL at 08:42

## 2018-07-13 RX ADMIN — DEXAMETHASONE SODIUM PHOSPHATE 5 MG: 10 INJECTION, SOLUTION INTRAMUSCULAR; INTRAVENOUS at 08:39

## 2018-07-13 RX ADMIN — LIDOCAINE HYDROCHLORIDE,EPINEPHRINE BITARTRATE 1.5 ML: 10; .01 INJECTION, SOLUTION INFILTRATION; PERINEURAL at 08:40

## 2018-07-13 RX ADMIN — METHYLPREDNISOLONE ACETATE 40 MG: 40 INJECTION, SUSPENSION INTRA-ARTICULAR; INTRALESIONAL; INTRAMUSCULAR; SOFT TISSUE at 08:38

## 2018-07-13 ASSESSMENT — PAIN SCALES - GENERAL
PAINLEVEL: MODERATE PAIN (5)
PAINLEVEL: NO PAIN (1)

## 2018-07-13 NOTE — PROGRESS NOTES
Pre-procedure Intake    Have you been fasting? No     If yes, for how long?    Are you taking a prescribed blood thinner such as coumadin, Plavix, Xarelto?    No    If yes, when did you take your last dose?     Do you take aspirin?  No    If cervical procedure, have you held aspirin for 6 days?   NA    Do you have any allergies to contrast dye, iodine, steroid and/or numbing medications?  NO    Are you currently taking antibiotics or have an active infection?  NO    Have you had a fever/elevated temperature within the past week? NO    Are you currently taking oral steroids? NO    Do you have a ? Yes       Are you pregnant or breastfeeding?  Not Applicable    Are the vital signs normal?  Yes

## 2018-07-13 NOTE — PROGRESS NOTES
Pre procedure Diagnosis: lumbar radiculopathy, lumbar degenerative disc disease, lumbar disc herniation   Post procedure Diagnosis: Same  Procedure performed: lumbar interlaminar epidural steroid injection at L4-5, fluoroscopically guided, contrast controlled  Anesthesia: local  Complications: none  Operators: Silvestre Benito MD     Indications:   Rik Lyons is a 42 year old male was sent by Ankit Schulte PA-C for repeat lumbar epidural steroid injection.  The patient has a history of chronic lower back pain with pain radiating down the left lower extremity into the foot.  Examination shows antalgic gait, lumbar tenderness, and positive SLR.  he has tried conservative treatment including physical therapy, medications, and interventional procedures.    MRI was done on 10/31/17 which showed   FINDINGS: Five lumbar vertebrae are assumed. Mild loss of disc signal  with relatively normal disc height is noted at L5-S1. Moderate left  L4-5 apophyseal joint degenerative arthrosis is noted with mild  periarticular reactive marrow edema. Body heights and sagittal  alignment are within normal limits. The conus medullaris is  unremarkable in appearance on the sagittal images.      L2-3, L3-4: Normal.     L4-5: Annular bulge or broad-based central disc protrusion minimally  indenting the thecal sac. No central stenosis. Mild to moderate  bilateral foraminal stenosis below the exiting nerve roots.     L5-S1: Small central disc extrusion with slight cephalad dissection  slightly to the right of midline. This only mildly indents the thecal  sac. Small 0.3 cm disc fragment is also noted lateral to the right S1  nerve root near its origin from the thecal sac. There is no central  stenosis or asymmetrical displacement of either S1 nerve root. The L5  neural foramina are widely patent.       IMPRESSION:  1. L4-5 left apophyseal joint degenerative arthrosis and periarticular  reactive marrow edema suggesting this could be a pain  generator.  2. L4-5 mild to moderate bilateral foraminal stenosis.  3. L5-S1 small cephalad dissecting disc extrusion to the right of  midline without apparent direct neural impingement or stenosis. Small  0.3 cm disc fragment is also noted lateral to the right S1 nerve root  near its origin from the thecal sac. No nerve root displacement is  demonstrated.    Options/alternatives, benefits and risks were discussed with the patient including but not limited to bleeding, infection, no pain relief, tissue trauma, exposure to radiation, reaction to medications, spinal cord injury, dural puncture, weakness, numbness and headache.  Questions were answered to his satisfaction and he wishes to proceed. Voluntary informed consent was obtained and signed.     Vitals were reviewed: Yes  BP (!) 149/101  Pulse 73  Resp 15  Allergies were reviewed:  Yes   Medications were reviewed:  Yes   Pre-procedure pain score: 5/10    Procedure:  The patient's medical history, medications, and allergies were reviewed and reconciled.  After obtaining signed informed consent, the patient was brought into the procedure suite and was placed in a prone position on the procedure table.   A Pause for the Cause was performed.  Patient was prepped and draped in the usual sterile fashion.     The L4-5 interspace was identified with AP fluoroscopy.  A total of 2.5 ml of 1% lidocaine was used to anesthetize the skin and subcutaneous tissues for a midline approach.    A 20 gauge 3.5 inch Touhy needle was advanced utilizing intermittent AP and Lateral fluoroscopy and air for loss of resistance.  The epidural space was encountered on the first pass without difficulties.  Aspiration for blood and CSF was negative.  Needle position was verified by injecting 1.5 ml of Isovue 300 utilizing real-time fluoroscopy that showed good needle placement and epidural spread without signs of intravascular or intrathecal uptake.  Isovue wasted:  13.5 ml.    Then, after  repeated negative aspiration for blood or CSF, a test dose was performed by injecting 1.5 ml of Lidocaine 1% with epinephrine into the epidural space which was negative for heart rate or blood pressure changes, tinnitus, metallic taste, lower extremity paresthesias, or other complaints.    Then, after repeated negative aspiration for blood or CSF, a combination of Depomedrol 40 mg, Dexamethasone 5 mg, Bupivicaine 0.5% 1 ml, diluted with 2.5 ml of normal saline to a total injectate volume of 5 ml was injected into the epidural space in a slow and incremental fashion and the needle was restyletted and withdrawn.  All injected medications were preservative free.    The injection site was cleaned and a sterile dressing was applied.    The patient tolerated the procedure well without complications and was taken to the recovery room for continued observation.    Images were saved to PACS.    Post-procedure pain score: 1/10  Follow-up includes:   -f/u phone call in one week  -f/u with referring provider    Silvestre Benito MD  [facility] [place]

## 2018-07-13 NOTE — IP AVS SNAPSHOT
[facility]    [address]    [facility] [address]    Phone:  [phone]    Fax:  [phone]                                       After Visit Summary   7/13/2018    Rik Lyons    MRN: [id number]           After Visit Summary Signature Page     I have received my discharge instructions, and my questions have been answered. I have discussed any challenges I see with this plan with the nurse or doctor.    ..........................................................................................................................................  Patient/Patient Representative Signature      ..........................................................................................................................................  Patient Representative Print Name and Relationship to Patient    ..................................................               ................................................  Date                                            Time    ..........................................................................................................................................  Reviewed by Signature/Title    ...................................................              ..............................................  Date                                                            Time

## 2018-07-13 NOTE — PATIENT INSTRUCTIONS
[facility]   Procedure Discharge Instructions    Today you saw:    Dr. Silvestre Benito     You had an:  Lumbar Epidural steroid injection      Medications used:  Lidocaine   Bupivacaine   Dexamethasone Depo-medrol IsoVue            Be cautious when walking. Numbness and/or weakness in the lower extremities may occur for up to 6-8 hours after the procedure due to effect of the local anesthetic    Do not drive for 6 hours. The effect of the local anesthetic could slow your reflexes.     You may resume your regular activities after 24 hours    Avoid strenuous activity for the first 24 hours    You may shower, however avoid swimming, tub baths or hot tubs for 24 hours following your procedure    You may have a mild to moderate increase in pain for several days following the injection.    It may take up to 14 days for the steroid medication to start working although you may feel the effect as early as a few days after the procedure.       You may use ice packs for 10-15 minutes, 3 to 4 times a day at the injection site for comfort    Do not use heat to painful areas for 6 to 8 hours. This will give the local anesthetic time to wear off and prevent you from accidentally burning your skin.     You may use anti-inflammatory medications (such as Ibuprofen or Aleve or Advil) or Tylenol for pain control if necessary    If you experience any of the following, call the Pain Clinic during work hours at [phone] or the Provider Line after hours at [phone]:  -Fever over 100 degree F  -Swelling, bleeding, redness, drainage, warmth at the injection site  -Progressive weakness or numbness in your legs or arms  -Loss of bowel or bladder function  -Unusual headache that is not relieved by Tylenol or other pain reliever  -Unusual new onset of pain that is not improving

## 2018-07-13 NOTE — MR AVS SNAPSHOT
MRN:[id number]                      After Visit Summary   7/13/2018    Rik Lyons    MRN: [id number]           Visit Information        Provider Department      7/13/2018 8:15 AM Silvestre Desai MD [facility] [place]        Care Instructions    [facility]   Procedure Discharge Instructions    Today you saw:    Dr. Silvestre Benito     You had an:  Lumbar Epidural steroid injection      Medications used:  Lidocaine   Bupivacaine   Dexamethasone Depo-medrol IsoVue            Be cautious when walking. Numbness and/or weakness in the lower extremities may occur for up to 6-8 hours after the procedure due to effect of the local anesthetic    Do not drive for 6 hours. The effect of the local anesthetic could slow your reflexes.     You may resume your regular activities after 24 hours    Avoid strenuous activity for the first 24 hours    You may shower, however avoid swimming, tub baths or hot tubs for 24 hours following your procedure    You may have a mild to moderate increase in pain for several days following the injection.    It may take up to 14 days for the steroid medication to start working although you may feel the effect as early as a few days after the procedure.       You may use ice packs for 10-15 minutes, 3 to 4 times a day at the injection site for comfort    Do not use heat to painful areas for 6 to 8 hours. This will give the local anesthetic time to wear off and prevent you from accidentally burning your skin.     You may use anti-inflammatory medications (such as Ibuprofen or Aleve or Advil) or Tylenol for pain control if necessary    If you experience any of the following, call the Pain Clinic during work hours at [phone] or the Provider Line after hours at [phone]:  -Fever over 100 degree F  -Swelling, bleeding, redness, drainage, warmth at the injection site  -Progressive weakness or numbness in your legs or  arms  -Loss of bowel or bladder function  -Unusual headache that is not relieved by Tylenol or other pain reliever  -Unusual new onset of pain that is not improving             Care EveryWhere ID     This is your Care EveryWhere ID. This could be used by other organizations to access your Boss medical records  [id number]        Equal Access to Services     LOU DICKSON : Rhiannon Carty, [place], sabina gayalkiki rojas. [facility] [phone].    ATENCIÓN: Si habla español, tiene a mcginnis disposición servicios gratuitos de asistencia lingüística. Roxy al [phone].    We comply with applicable federal civil rights laws and [place] laws. We do not discriminate on the basis of race, color, national origin, age, disability, sex, sexual orientation, or gender identity.

## 2018-08-13 DIAGNOSIS — I10 BENIGN ESSENTIAL HYPERTENSION: ICD-10-CM

## 2018-08-15 RX ORDER — LOSARTAN POTASSIUM 25 MG/1
TABLET ORAL
Qty: 15 TABLET | Refills: 0 | OUTPATIENT
Start: 2018-08-15

## 2018-12-26 DIAGNOSIS — I10 BENIGN ESSENTIAL HYPERTENSION: ICD-10-CM

## 2018-12-26 RX ORDER — LOSARTAN POTASSIUM 25 MG/1
TABLET ORAL
Qty: 45 TABLET | Refills: 1 | Status: SHIPPED | OUTPATIENT
Start: 2018-12-26 | End: 2019-07-30

## 2019-02-18 ENCOUNTER — MYC MEDICAL ADVICE (OUTPATIENT)
Dept: FAMILY MEDICINE | Facility: CLINIC | Age: 44
End: 2019-02-18

## 2019-02-18 DIAGNOSIS — M54.41 CHRONIC BILATERAL LOW BACK PAIN WITH BILATERAL SCIATICA: Primary | ICD-10-CM

## 2019-02-18 DIAGNOSIS — M54.42 CHRONIC BILATERAL LOW BACK PAIN WITH BILATERAL SCIATICA: Primary | ICD-10-CM

## 2019-02-18 DIAGNOSIS — G89.29 CHRONIC BILATERAL LOW BACK PAIN WITH BILATERAL SCIATICA: Primary | ICD-10-CM

## 2019-02-18 NOTE — TELEPHONE ENCOUNTER
See Buzzoohart message;  Requesting referral for his epidural for his chronic low back pain.  Last referral done 6/27/18 for epidural and note states follow up Yearly.    Have pended order.  Please send back to team to notify patient when done.  Johanna Lam RN

## 2019-02-20 ENCOUNTER — TELEPHONE (OUTPATIENT)
Dept: PALLIATIVE MEDICINE | Facility: CLINIC | Age: 44
End: 2019-02-20

## 2019-02-20 NOTE — TELEPHONE ENCOUNTER
Pre-screening Questions for Radiology Injections:    Injection to be done at which interventional clinic site? [facility] [place]    Instruct patient to arrive as directed prior to the scheduled appointment time:    [place] minutes before      [place]: 30 minutes before; if IV needed 1 hour before     Procedure ordered by oppel    Procedure ordered? Lumbar Epidural Steroid Injection    What insurance would patient like us to bill for this procedure? BCBS      Worker's comp or MVA (motor vehicle accident) -Any injection DO NOT SCHEDULE and route to Poornima Eric.      [employer] insurance - For SI joint injections, DO NOT SCHEDULE and route Poornima Reyes.       [employer] - Any injection besides hip/shoulder/knee joint DO NOT SCHEDULE and route to Poornima Reyes. She will obtain PA and call pt back to schedule procedure or notify pt of denial.        [employer]-Route to [place] for review        IF SCHEDULING IN [place] AND NEEDS A PA, IT IS OKAY TO SCHEDULE. [place] HANDLES THEIR OWN PA'S AFTER THE PATIENT IS SCHEDULED. PLEASE SCHEDULE AT LEAST 1 WEEK OUT SO A PA CAN BE OBTAINED.      Any chance of pregnancy? Not Applicable   If YES, do NOT schedule and route to RN pool    Is an  needed? No     Patient has a drive home? (mandatory) YES:     Is patient taking any blood thinners (plavix, coumadin, jantoven, warfarin, heparin, pradaxa or dabigatran )? No   If hold needed, do NOT schedule, route to RN pool     Is patient taking any aspirin products (includes Excedrin and Fiorinal)? No     If more than 325mg/day do NOT schedule; route to RN pool     For CERVICAL procedures, hold all aspirin products for 6 days.     Tell pt that if aspirin product is not held for 6 days, the procedure WILL BE cancelled.      Does the patient have a bleeding or clotting disorder? No     If YES, okay to schedule AND route to RN nurse pool    For any patients with platelet count <100, must be forwarded to  provider    Is patient diabetic?  No  If YES, have them bring their glucometer.    Does patient have an active infection or treated for one within the past week? No     Is patient currently taking any antibiotics?  No     For patients on chronic, preventative, or prophylactic antibiotics, procedures may be scheduled.     For patients on antibiotics for active or recent infection:    Conchita Morgan Burton, Snitzer-antibiotic course must have been completed for 4 days    Is patient currently taking any steroid medications? (i.e. Prednisone, Medrol)  No     For patients on steroid medications:    Conchita Morgan Burton, Snitzer-steroid course must have been completed for 4 days    Reviewed with patient:  If you are started on any steroids or antibiotics between now and your appointment, you must contact us because the procedure may need to be cancelled.  Yes    Is patient actively being treated for cancer or immunocompromised? No  If YES, do NOT schedule and route to RN pool     Are you able to get on and off an exam table with minimal or no assistance? Yes  If NO, do NOT schedule and route to RN pool    Are you able to roll over and lay on your stomach with minimal or no assistance? Yes  If NO, do NOT schedule and route to RN pool     Any allergies to contrast dye, iodine, shellfish, or numbing and steroid medications? No  If YES, route to RN pool AND add allergy information to appointment notes    Allergies: Patient has no known allergies.      Has the patient had a flu shot or any other vaccinations within 7 days before or after the procedure.  No     Does patient have an MRI/CT?  YES:   (SI joint, hip injections, lumbar sympathetic blocks, and stellate ganglion blocks do not require an MRI)    Was the MRI done w/in the last 3 years?  Yes    Was MRI done at [place]? Yes      If not, where was it done? N/A       If MRI was not done at [facility], [facility] or [facility] do NOT schedule and route  to nursing.  If pt has an imaging disc, the injection may be scheduled but pt has to bring disc to appt. If they show up w/out disc the injection cannot be done    Reminders (please tell patient if applicable):       Instructed pt to arrive 30 minutes early for IV start if this is for a cervical procedure, ALL sympathetic (stellate ganglion, hypogastric, or lumbar sympathetic block) and all sedation procedures (RFA, spinal cord stimulation trials).  Not Applicable   -IVs are not routinely placed for Dr. Sutherland cervical cases   -Dr. Chan: IVs for cervical ESIs and cervical TBDs (not CMBBs/facet inj)      If NPO for sedation, informed patient that it is okay to take medications with sips of water (except if they are to hold blood thinners).  Not Applicable   *DO take blood pressure medication if it is prescribed*      If this is for a cervical ERICKA, informed patient that aspirin needs to be held for 6 days.   Not Applicable      For all patients not having spinal cord stimulator (SCS) trials or radiofrequency ablations (RFAs), informed patient:    IV sedation is not provided for this procedure.  If you feel that an oral anti-anxiety medication is needed, you can discuss this further with your referring provider or primary care provider.  The Pain Clinic provider will discuss specifics of what the procedure includes at your appointment.  Most procedures last 10-20 minutes.  We use numbing medications to help with any discomfort during the procedure.  Not Applicable      Do not schedule procedures requiring IV placement in the first appointment of the day or first appointment after lunch. Do NOT schedule at 0745, 0815 or 1245.       For patients 85 or older we recommend having an adult stay w/ them for the remainder of the day.       Does the patient have any questions?    Poornima Reyes  [facility]

## 2019-02-21 ENCOUNTER — RADIOLOGY INJECTION OFFICE VISIT (OUTPATIENT)
Dept: PALLIATIVE MEDICINE | Facility: CLINIC | Age: 44
End: 2019-02-21
Payer: COMMERCIAL

## 2019-02-21 ENCOUNTER — ANCILLARY PROCEDURE (OUTPATIENT)
Dept: RADIOLOGY | Facility: CLINIC | Age: 44
End: 2019-02-21
Attending: PSYCHIATRY & NEUROLOGY
Payer: COMMERCIAL

## 2019-02-21 VITALS
OXYGEN SATURATION: 98 % | DIASTOLIC BLOOD PRESSURE: 102 MMHG | HEART RATE: 84 BPM | RESPIRATION RATE: 16 BRPM | SYSTOLIC BLOOD PRESSURE: 147 MMHG

## 2019-02-21 DIAGNOSIS — M53.3 SACROILIAC JOINT DYSFUNCTION OF LEFT SIDE: Primary | ICD-10-CM

## 2019-02-21 DIAGNOSIS — M54.16 LUMBAR RADICULOPATHY: ICD-10-CM

## 2019-02-21 PROCEDURE — 27096 INJECT SACROILIAC JOINT: CPT | Mod: LT | Performed by: PSYCHIATRY & NEUROLOGY

## 2019-02-21 ASSESSMENT — PAIN SCALES - GENERAL: PAINLEVEL: EXTREME PAIN (8)

## 2019-02-21 NOTE — NURSING NOTE
Pre-procedure Intake    Have you been fasting? NA    If yes, for how long? No     Are you taking a prescribed blood thinner such as coumadin, Plavix, Xarelto?    No    If yes, when did you take your last dose? No     Do you take aspirin?  No    If cervical procedure, have you held aspirin for 6 days?   No     Do you have any allergies to contrast dye, iodine, steroid and/or numbing medications?  NO    Are you currently taking antibiotics or have an active infection?  NO    Have you had a fever/elevated temperature within the past week? NO    Are you currently taking oral steroids? NO    Do you have a ? Yes       Are you pregnant or breastfeeding?  Not Applicable    Are the vital signs normal?  No     Lul Ambriz MA

## 2019-02-21 NOTE — NURSING NOTE
Discharge Information    IV Discontiued Time:  NA    Amount of Fluid Infused:  NA    Discharge Criteria = When patient returns to baseline or as per MD order    Consciousness:  Pt is fully awake    Circulation:  BP +/- 20% of pre-procedure level    Respiration:  Patient is able to breathe deeply    O2 Sat:  Patient is able to maintain O2 Sat >92% on room air    Activity:  Moves 4 extremities on command    Ambulation:  Patient is able to stand and walk or stand and pivot into wheelchair    Dressing:  Clean/dry or No Dressing    Notes:   Discharge instructions and AVS given to patient    Patient meets criteria for discharge?  YES    Admitted to PCU?  No    Responsible adult present to accompany patient home?  Yes    Signature/Title:    Rashid Parks RN Care Coordinator  [facility]

## 2019-02-21 NOTE — PROGRESS NOTES
Pre procedure Diagnosis: left sacroiliac joint pain  Post procedure Diagnosis: Same  Procedure performed: left sacroiliac joint injection   Anesthesia: none  Complications: none  Operators: Maranda Arango MD and Juarez Spencer DO (Pain Medicine Fellow)     Indications:   Rik Lyons is a 43 year old male sent by JEMMA Paz for injections, with focal left upper gluteal pain since Saturday. May have begun after helping a friend who's ~240 lbs into a cab. This pain can radiate into the left testicle.   It feels different than the pain he had in the past.  Exam shows point tenderness inferomedial to the left PSIS and they have tried conservative treatment including ibuprofen. SI joint provocative tests (such as SUE, Yeoman, sacral thrust, compression, distraction) were all negative though.    MRI was done on 10/31/17 which showed   1. L4-5 left apophyseal joint degenerative arthrosis and periarticular  reactive marrow edema suggesting this could be a pain generator.  2. L4-5 mild to moderate bilateral foraminal stenosis.  3. L5-S1 small cephalad dissecting disc extrusion to the right of  midline without apparent direct neural impingement or stenosis. Small  0.3 cm disc fragment is also noted lateral to the right S1 nerve root  near its origin from the thecal sac. No nerve root displacement is  demonstrated.     Options/alternatives, benefits and risks were discussed with the patient including bleeding, infection, no pain relief, tissue trauma, exposure to radiation, reaction to medications, spinal cord injury, dural puncture, weakness, numbness and headache.  Questions were answered to his satisfaction and he agrees to proceed. Voluntary informed consent was obtained and signed.     Vitals were reviewed: Yes  Allergies were reviewed:  Yes   Medications were reviewed:  Yes   Pre-procedure pain score: 8/10    Procedure:  After getting informed consent, patient was brought into the procedure suite and was placed in  "a prone position on the procedure table.   A Pause for the Cause was performed.  Patient was prepped and draped in sterile fashion.     After identifying the left SI joint, the C-arm was rotated to a obliquely to obtain the best view of the inferior angle of the joint.  A total of 2 ml of Lidocaine 1%  was used to anesthetize the skin at a skin entry site coaxial with the fluoroscopy beam at this location.  A 22gauge 3.5 inch needle was advanced under intermittent fluoroscopy until it was felt to enter the SI joint.    A total of 0.5ml of Omnipaque-300 was injected, confirming appropriate position, with spread into the intraarticular space, with no intravascular uptake noted. 9.5ml was wasted. Location was verified in lateral view.    1.5ml of 0.2% ropivacaine with 40mg of kenalog was injected.  The needle was flushed with lidocaine and removed.     Hemostasis was achieved, the area was cleaned, and bandaids were placed when appropriate.  The patient tolerated the procedure well, and was taken to the recovery room.    Images were saved to PACS.    Post-procedure pain score: 3/10  Follow-up includes:   -f/u phone call in one week  -f/u with referring provider  -if not helpful after 2 weeks, we could consider doing a different injection- doesn't have flavor of being radicular in nature at this time, so not sure that epidural steroid injection would be appropriate, but we can reevaluate in a \"lumbar TBD\" order after 2 weeks if needed.    Maranda Arango MD  [facility]             "

## 2019-02-21 NOTE — PATIENT INSTRUCTIONS
[facility]   Procedure Discharge Instructions    Today you saw:  Dr. Luz Maria Arango      You had an:     sacroiliac joint injection     Medications used:  Lidocaine Omnipaque  Ropivicaine   Kenalog                If you were holding your blood thinning medication, please restart taking it: N/A    Be cautious when walking. Numbness and/or weakness in the lower extremities may occur for up to 6-8 hours after the procedure due to effect of the local anesthetic    Do not drive for 6 hours. The effect of the local anesthetic could slow your reflexes.     You may resume your regular activities after 24 hours    Avoid strenuous activity for the first 24 hours    You may shower, however avoid swimming, tub baths or hot tubs for 24 hours following your procedure    You may have a mild to moderate increase in pain for several days following the injection.    It may take up to 14 days for the steroid medication to start working although you may feel the effect as early as a few days after the procedure.       You may use ice packs for 10-15 minutes, 3 to 4 times a day at the injection site for comfort    Do not use heat to painful areas for 6 to 8 hours. This will give the local anesthetic time to wear off and prevent you from accidentally burning your skin.     You may use anti-inflammatory medications (such as Ibuprofen or Aleve or Advil) or Tylenol for pain control if necessary    Possible side effects of steroids that you may experience include flushing, elevated blood pressure, increased appetite, mild headaches and restlessness.  All of these symptoms will get better with time.    If you experience any of the following, call the Pain Clinic during work hours at [phone] or the Provider Line after hours at [phone]:  -Fever over 100 degree F  -Swelling, bleeding, redness, drainage, warmth at the injection site  -Progressive weakness or numbness in your legs   -Unusual new onset of pain that  is not improving

## 2019-04-01 ENCOUNTER — TELEPHONE (OUTPATIENT)
Dept: PALLIATIVE MEDICINE | Facility: CLINIC | Age: 44
End: 2019-04-01

## 2019-04-01 DIAGNOSIS — M54.42 CHRONIC BILATERAL LOW BACK PAIN WITH BILATERAL SCIATICA: Primary | ICD-10-CM

## 2019-04-01 DIAGNOSIS — G89.29 CHRONIC BILATERAL LOW BACK PAIN WITH BILATERAL SCIATICA: Primary | ICD-10-CM

## 2019-04-01 DIAGNOSIS — M54.41 CHRONIC BILATERAL LOW BACK PAIN WITH BILATERAL SCIATICA: Primary | ICD-10-CM

## 2019-04-01 NOTE — TELEPHONE ENCOUNTER
Pt calling in to schedule another injection for Lumbar TBD. He stated he is going on Vacation on Friday and would like to get it done this week. Please review and place order if needed.      Sara RODGERS    [facility]

## 2019-04-01 NOTE — TELEPHONE ENCOUNTER
Ankit,  The injection we did wasn't helpful. He didn't have radicular symptoms at that time, but now does.  Could you please sign this order for injection, and route back to me?    Maranda Arango MD  [facility]

## 2019-04-01 NOTE — TELEPHONE ENCOUNTER
"Patient has been seen for injections only and orders have been typically placed by Ankit Schulte PA-C but in review found note below from 2/21/19 procedure.  ------------------------------------------------  -if not helpful after 2 weeks, we could consider doing a different injection- doesn't have flavor of being radicular in nature at this time, so not sure that epidural steroid injection would be appropriate, but we can reevaluate in a \"lumbar TBD\" order after 2 weeks if needed.     Maranda Arango MD  [facility]  -------------------------------------------------    Pain is radiating down the back of the left leg into foot. When I asked that hePatient reports the SI joint injection was \"trying something new \" and he was told to reach back out to us if it doesn't work and he wants to try the LESI again. He is reporting the SI joint injection was not effective and he is hoping the  will order the LESI.    02/21/19 Procedure performed: left sacroiliac joint injection   07/13/18 Procedure performed: lumbar interlaminar epidural steroid injection at L4-5  12/21/17 Procedure performed: L4-5 interlaminar epidural steroid injection     Order pended. Routing to provider to review request.    ALIYAH Farnsworth, RN  Care Coordinator  [facility]    "

## 2019-04-03 NOTE — TELEPHONE ENCOUNTER
Pre-screening Questions for Radiology Injections:    Injection to be done at which interventional clinic site? [facility] [place]    Instruct patient to arrive as directed prior to the scheduled appointment time:    [place] minutes before      [place]: 30 minutes before; if IV needed 1 hour before     Procedure ordered by Dr. Schulte    Procedure ordered? Lumbar TBD    What insurance would patient like us to bill for this procedure? BCBS      Worker's comp or MVA (motor vehicle accident) -Any injection DO NOT SCHEDULE and route to Poornima Reyes.      [employer] insurance - For SI joint injections, DO NOT SCHEDULE and route Poornima Reyes.       [employer] - Any injection besides hip/shoulder/knee joint DO NOT SCHEDULE and route to Poornima Reyes. She will obtain PA and call pt back to schedule procedure or notify pt of denial.       [employer]-Route to [place] for review        IF SCHEDULING IN [place] AND NEEDS A PA, IT IS OKAY TO SCHEDULE. [place] HANDLES THEIR OWN PA'S AFTER THE PATIENT IS SCHEDULED. PLEASE SCHEDULE AT LEAST 1 WEEK OUT SO A PA CAN BE OBTAINED.      Any chance of pregnancy? Not Applicable   If YES, do NOT schedule and route to RN pool    Is an  needed? No     Patient has a drive home? (mandatory) YES: INFORMED    Is patient taking any blood thinners (plavix, coumadin, jantoven, warfarin, heparin, pradaxa or dabigatran )? No   If hold needed, do NOT schedule, route to RN pool     Is patient taking any aspirin products (includes Excedrin and Fiorinal)? No     If more than 325mg/day do NOT schedule; route to RN pool     For CERVICAL procedures, hold all aspirin products for 6 days.     Tell pt that if aspirin product is not held for 6 days, the procedure WILL BE cancelled.      Does the patient have a bleeding or clotting disorder? No     If YES, okay to schedule AND route to RN nurse pool    For any patients with platelet count <100, must be forwarded to provider    Is  patient diabetic?  No  If YES, have them bring their glucometer.    Does patient have an active infection or treated for one within the past week? No     Is patient currently taking any antibiotics?  No     For patients on chronic, preventative, or prophylactic antibiotics, procedures may be scheduled.     For patients on antibiotics for active or recent infection:    Conchita Morgan Burton, [place]-antibiotic course must have been completed for 4 days    Is patient currently taking any steroid medications? (i.e. Prednisone, Medrol)  No     For patients on steroid medications:    Conchita Morgan Burton, [place]-steroid course must have been completed for 4 days    Reviewed with patient:  If you are started on any steroids or antibiotics between now and your appointment, you must contact us because the procedure may need to be cancelled.  Yes    Is patient actively being treated for cancer or immunocompromised? No  If YES, do NOT schedule and route to RN pool     Are you able to get on and off an exam table with minimal or no assistance? Yes  If NO, do NOT schedule and route to RN pool    Are you able to roll over and lay on your stomach with minimal or no assistance? Yes  If NO, do NOT schedule and route to RN pool     Any allergies to contrast dye, iodine, shellfish, or numbing and steroid medications? No  If YES, route to RN pool AND add allergy information to appointment notes    Allergies: Patient has no known allergies.      Has the patient had a flu shot or any other vaccinations within 7 days before or after the procedure.  No     Does patient have an MRI/CT?  YES: MRI  (SI joint, hip injections, lumbar sympathetic blocks, and stellate ganglion blocks do not require an MRI)    Was the MRI done w/in the last 3 years?  Yes    Was MRI done at [place]? Yes      If not, where was it done? N/A       If MRI was not done at [place], [facility] or [facility] do NOT schedule and route to nursing.   If pt has an imaging disc, the injection may be scheduled but pt has to bring disc to appt. If they show up w/out disc the injection cannot be done    Reminders (please tell patient if applicable):       Instructed pt to arrive 30 minutes early for IV start if this is for a cervical procedure, ALL sympathetic (stellate ganglion, hypogastric, or lumbar sympathetic block) and all sedation procedures (RFA, spinal cord stimulation trials).  Not Applicable   -IVs are not routinely placed for Dr. Sutherland cervical cases   -Dr. Chan: IVs for cervical ESIs and cervical TBDs (not CMBBs/facet inj)      If NPO for sedation, informed patient that it is okay to take medications with sips of water (except if they are to hold blood thinners).  Not Applicable   *DO take blood pressure medication if it is prescribed*      If this is for a cervical ERICKA, informed patient that aspirin needs to be held for 6 days.   Not Applicable      For all patients not having spinal cord stimulator (SCS) trials or radiofrequency ablations (RFAs), informed patient:    IV sedation is not provided for this procedure.  If you feel that an oral anti-anxiety medication is needed, you can discuss this further with your referring provider or primary care provider.  The Pain Clinic provider will discuss specifics of what the procedure includes at your appointment.  Most procedures last 10-20 minutes.  We use numbing medications to help with any discomfort during the procedure.  Not Applicable      Do not schedule procedures requiring IV placement in the first appointment of the day or first appointment after lunch. Do NOT schedule at 0745, 0815 or 1245. N/A      For patients 85 or older we recommend having an adult stay w/ them for the remainder of the day.   N/A    Does the patient have any questions?  NO  Barb Bass  [facility]

## 2019-05-17 ENCOUNTER — OFFICE VISIT (OUTPATIENT)
Dept: UROLOGY | Facility: CLINIC | Age: 44
End: 2019-05-17
Payer: COMMERCIAL

## 2019-05-17 VITALS — HEART RATE: 84 BPM | RESPIRATION RATE: 14 BRPM

## 2019-05-17 DIAGNOSIS — Z30.09 VASECTOMY EVALUATION: Primary | ICD-10-CM

## 2019-05-17 PROCEDURE — 99202 OFFICE O/P NEW SF 15 MIN: CPT | Performed by: UROLOGY

## 2019-05-17 RX ORDER — CHLORAL HYDRATE 500 MG
2 CAPSULE ORAL DAILY
COMMUNITY
End: 2023-07-07

## 2019-05-17 NOTE — PATIENT INSTRUCTIONS
Your Vasectomy is scheduled 7/12/2019 @ 9:00am.  Please call [phone] if you need to reschedule this appointment or if you have any question.     Preparation for Vasectomy:  Shave the hair away from the base of your penis and around your testicles.  Wear snug underwear the day of the vasectomy to support your testicles.  Do not take aspirin, ibuprofen, advil, motrin, aleve products one week prior to your vasectomy.        General Vasectomy Information    Vasectomy is a surgery.  If it is successful, it will be impossible for you to ever father children.  The following information regards the male sterilization done by an operation called a vasectomy.  This is done in the physician's office.    The operation done to sterilize the male is easier, safer and much less expensive than the operation done to sterilize the woman.    Sterilization should be considered permanent.  For most males, once the operation is done, it can never me undone.  There are attempts made occasionally to reconnect the tubes that have been cut during the procedure, but this is very difficult and expensive and works only about 50-70% of the time.  In order for any of the physicians in our clinic to do a vasectomy, we require that you consider this a permanent form a sterilization.    A vasectomy can be done at any time, but it is best to think about having it done when you can take at least one day off from work and any excess activities.    Your decision to have a vasectomy should only be made with the following facts clear in mind.    1. First, a vasectomy is only one of several means of birth control.  Many form of temporary contraception are available.  If you have any questions about other methods, please discuss this with your physician.    2. A vasectomy may be unsuccessful in approximately one out of 1000 couples per year.  This occurs when the tubes which are cut during the procedure reconnect themselves.  Sterility cannot be  guaranteed.    3. You should be aware that it is the current belief of the medical profession that a vasectomy procedure does not alter a male physically, physiologically or sexually.  Because each person is a unique individual, there is always the possibility of an adverse phychiatric reaction.  This can be best avoided by being very comfortable in your own mind that you want to have this done, and that you do not want to father any children in the future.  If this is not clear in your mind, this should be further discussed with your physician.    4. You will not notice a change in the volume of your ejaculate since sperm is a very small amount of the semen and it is only the sperm that is stopped from entering the ejaculate after a vasectomy.  Your prostate and seminal vesicle glands really supply most of the semen and this is not at all decreased after a vasectomy.  Also there is no effect on the male hormones.    5. You do not become sterile immediately following a vasectomy due to the fact that there is still sperm remaining in your system that must be eliminated by ejaculation.  For this reason, your sexual partner could still become pregnant for a period of time following the vasectomy operation.  It is necessary that contraceptive measures be used until you receive confirmation from your physician that you are sterile.  It takes approximately 12 ejaculations to clear the semen of sperm, but this can differ in different men.  For this reason, it is very important that your semen be checked for sperm before you are considered sterile, and this should be done approximately 12 weeks after your vasectomy.      6. Vasectomy has risks and benefits.  Among the risks are possible complications resulting from the procedure.  These risks include but are not limited to:   A.  Bleeding, infection, or hematoma occuring during or in the recovery period   from the procedure.   B.  Sperm granuloma or a small pea to walnut  sized lump which is a collection of   scar tissue and sperm in your scrotal sack and remains permanently   C.  There may be an increased risk of prostate cancer although the data is   unclear.

## 2019-05-17 NOTE — PROGRESS NOTES
Vasectomy Consult    Reason for visit:   Discuss as a method of birth control/sterilization.  He is interested in vasectomy scrotally.  Patient has 2 children and desires sterilization.  Does not want to use condom or having partners on birth control pills.  He has no erections problem.  He has no urinary complaints.    Current Outpatient Medications   Medication Sig Dispense Refill     fish oil-omega-3 fatty acids 1000 MG capsule Take 2 g by mouth daily       losartan (COZAAR) 25 MG tablet TAKE 1/2 TABLET(12.5 MG) BY MOUTH DAILY 45 tablet 1     Loratadine (CLARITIN PO)        nabumetone (RELAFEN) 750 MG tablet 1 tablet twice a day for 10 days and then as needed (Patient not taking: Reported on 2/21/2019) 60 tablet 1     No Known Allergies  Past Medical History:   Diagnosis Date     NO ACTIVE PROBLEMS      Past Surgical History:   Procedure Laterality Date     APPENDECTOMY        Family History   Problem Relation Age of Onset     Other Cancer Mother      Coronary Artery Disease Father         age 53     Diabetes Father      Social History     Socioeconomic History     Marital status:      Spouse name: None     Number of children: None     Years of education: None     Highest education level: None   Occupational History     None   Social Needs     Financial resource strain: None     Food insecurity:     Worry: None     Inability: None     Transportation needs:     Medical: None     Non-medical: None   Tobacco Use     Smoking status: Former Smoker     Types: Cigarettes     Smokeless tobacco: Never Used   Substance and Sexual Activity     Alcohol use: Yes     Comment: Occ     Drug use: No     Sexual activity: Yes     Partners: Female   Lifestyle     Physical activity:     Days per week: None     Minutes per session: None     Stress: None   Relationships     Social connections:     Talks on phone: None     Gets together: None     Attends [employer] service: None     Active member of club or organization: None      Attends meetings of clubs or organizations: None     Relationship status: None     Intimate partner violence:     Fear of current or ex partner: None     Emotionally abused: None     Physically abused: None     Forced sexual activity: None   Other Topics Concern     Parent/sibling w/ CABG, MI or angioplasty before 65F 55M? No   Social History Narrative     None       REVIEW OF SYSTEMS  =================  C: NEGATIVE for fever, chills, change in weight  I: NEGATIVE for worrisome rashes, moles or lesions  E/M: NEGATIVE for ear, mouth and throat problems  R: NEGATIVE for significant cough or SHORTNESS OF BREATH  CV:  NEGATIVE for chest pain, palpitations or peripheral edema  GI: NEGATIVE for nausea, abdominal pain, heartburn, or change in bowel habits  NEURO: NEGATIVE numbness/weakness  : see HPI  PSYCH: NEGATIVE depression/anxiety  LYmph: no new enlarged lymph nodes  Ortho: no new trauma/movements      Physical Exam:  Pulse 84   Resp 14    Patient is pleasant, in no acute distress, good general condition.  HEENT:  Normalcephalic, atraumatic  Lung: no evidence of respiratory distress    Abdomen: Soft, nondistended, non tender. No masses. No rebound or guarding.   Exam: penis no d/c testis no masses bilateral vas palpated no scrotal lesion  Skin: Warm and dry.  No redness.  Neuro: grossly normal  Psych normal mood and affect  Musculoskeletal  moving all extremities        Discussed    That vasectomy is permanent method of birth control.    That vasectomy can fail due to recanalization of the vas even many months/years later.    That he needs 2 negative sperm checks to be considered sterile    That there are other methods that are not permanent and also that the sperm can be frozen for later use.    How the technique is performed, risks of infection, bleeding, damage to the testes vessels and testes atrophy    Long term complication such as chronic and difficult to treat testes pain and questionable increase  incidence of prostate cancer    That the procedure can be done at the clinic or hospital OR        Plan:    Stop Aspirin  Will schedule Vasectomy in the future

## 2019-06-07 ENCOUNTER — TELEPHONE (OUTPATIENT)
Dept: FAMILY MEDICINE | Facility: CLINIC | Age: 44
End: 2019-06-07

## 2019-06-07 NOTE — TELEPHONE ENCOUNTER
Panel Management Review      Patient has the following on his problem list:     Hypertension   Last three blood pressure readings:  BP Readings from Last 3 Encounters:   02/21/19 (!) 147/102   07/13/18 (!) 149/101   06/27/18 135/89     Blood pressure: MONITOR    HTN Guidelines:  Less than 140/90      Composite cancer screening  Chart review shows that this patient is due/due soon for the following None  Summary:    Patient is due/failing the following:   BP CHECK and FOLLOW UP    Action needed:   Patient needs office visit for blood pressure check.    Type of outreach:    Sent Bitly message.    Questions for provider review:    None                                                                                                                                    Eduarda Lerner MA     Chart routed to closed.

## 2019-07-12 ENCOUNTER — OFFICE VISIT (OUTPATIENT)
Dept: UROLOGY | Facility: CLINIC | Age: 44
End: 2019-07-12
Payer: COMMERCIAL

## 2019-07-12 DIAGNOSIS — Z30.2 ENCOUNTER FOR VASECTOMY: Primary | ICD-10-CM

## 2019-07-12 PROCEDURE — 99000 SPECIMEN HANDLING OFFICE-LAB: CPT | Performed by: UROLOGY

## 2019-07-12 PROCEDURE — 55250 REMOVAL OF SPERM DUCT(S): CPT | Performed by: UROLOGY

## 2019-07-12 PROCEDURE — 88302 TISSUE EXAM BY PATHOLOGIST: CPT | Performed by: UROLOGY

## 2019-07-17 LAB — COPATH REPORT: NORMAL

## 2019-07-30 DIAGNOSIS — I10 BENIGN ESSENTIAL HYPERTENSION: ICD-10-CM

## 2019-07-30 NOTE — LETTER
July 31, 2019    Rik PEÑA Given  [address] [facility] [address]    Dear Rik,       We recently received a refill request for losartan (COZAAR) 25 MG tablet.  We have refilled this for a one time 30 day supply only because you are due for a:    Blood pressure/medication check office visit with your provider and fasting lab appointment      Please schedule this lab appointment 4-5 days prior to the office visit.     Please call at your earliest convenience so that there will not be a delay with your future refills.          Thank you,   Your [facility] Care Team/  [phone]

## 2019-07-31 RX ORDER — LOSARTAN POTASSIUM 25 MG/1
TABLET ORAL
Qty: 15 TABLET | Refills: 0 | Status: SHIPPED | OUTPATIENT
Start: 2019-07-31 | End: 2019-09-17

## 2019-08-19 ENCOUNTER — OFFICE VISIT (OUTPATIENT)
Dept: FAMILY MEDICINE | Facility: CLINIC | Age: 44
End: 2019-08-19
Payer: COMMERCIAL

## 2019-08-19 VITALS
BODY MASS INDEX: 31.9 KG/M2 | HEART RATE: 62 BPM | DIASTOLIC BLOOD PRESSURE: 109 MMHG | OXYGEN SATURATION: 95 % | TEMPERATURE: 97.9 F | WEIGHT: 216 LBS | SYSTOLIC BLOOD PRESSURE: 168 MMHG | RESPIRATION RATE: 16 BRPM

## 2019-08-19 DIAGNOSIS — I10 BENIGN ESSENTIAL HYPERTENSION: Primary | ICD-10-CM

## 2019-08-19 LAB
ANION GAP SERPL CALCULATED.3IONS-SCNC: 10 MMOL/L (ref 3–14)
BUN SERPL-MCNC: 17 MG/DL (ref 7–30)
CALCIUM SERPL-MCNC: 9.5 MG/DL (ref 8.5–10.1)
CHLORIDE SERPL-SCNC: 106 MMOL/L (ref 94–109)
CO2 SERPL-SCNC: 24 MMOL/L (ref 20–32)
CREAT SERPL-MCNC: 0.8 MG/DL (ref 0.66–1.25)
GFR SERPL CREATININE-BSD FRML MDRD: >90 ML/MIN/{1.73_M2}
GLUCOSE SERPL-MCNC: 119 MG/DL (ref 70–99)
POTASSIUM SERPL-SCNC: 3.6 MMOL/L (ref 3.4–5.3)
SODIUM SERPL-SCNC: 140 MMOL/L (ref 133–144)

## 2019-08-19 PROCEDURE — 99214 OFFICE O/P EST MOD 30 MIN: CPT | Performed by: NURSE PRACTITIONER

## 2019-08-19 PROCEDURE — 93000 ELECTROCARDIOGRAM COMPLETE: CPT | Performed by: NURSE PRACTITIONER

## 2019-08-19 PROCEDURE — 80048 BASIC METABOLIC PNL TOTAL CA: CPT | Performed by: NURSE PRACTITIONER

## 2019-08-19 PROCEDURE — 36415 COLL VENOUS BLD VENIPUNCTURE: CPT | Performed by: NURSE PRACTITIONER

## 2019-08-19 RX ORDER — LOSARTAN POTASSIUM 25 MG/1
25 TABLET ORAL DAILY
Qty: 30 TABLET | Refills: 0 | Status: SHIPPED | OUTPATIENT
Start: 2019-08-19 | End: 2019-10-08

## 2019-08-19 NOTE — PROGRESS NOTES
Subjective     Rik Lyons is a 43 year old male who presents to clinic today for the following health issues:    HPI   Hypertension Follow-up      Do you check your blood pressure regularly outside of the clinic? Yes     Are you following a low salt diet? no    Are your blood pressures ever more than 140 on the top number (systolic) OR more   than 90 on the bottom number (diastolic), for example 140/90? Yes      How many servings of fruits and vegetables do you eat daily?  2-3    On average, how many sweetened beverages do you drink each day (soda, juice, sweet tea, etc)?   1  How many days per week do you miss taking your medication? 1    What makes it hard for you to take your medications?  sometimes on weekends but not every week      Past Medical History:   Diagnosis Date     NO ACTIVE PROBLEMS      Current Outpatient Medications   Medication     fish oil-omega-3 fatty acids 1000 MG capsule     Loratadine (CLARITIN PO)     losartan (COZAAR) 25 MG tablet     No current facility-administered medications for this visit.       No Known Allergies    Review of Systems   ROS COMP: Constitutional, HEENT, cardiovascular, pulmonary, GI, , musculoskeletal, neuro, skin, endocrine and psych systems are negative, except as otherwise noted.      Objective    BP (!) 168/109   Pulse 62   Temp 97.9  F (36.6  C) (Oral)   Resp 16   Wt 98 kg (216 lb)   SpO2 95%   BMI 31.90 kg/m    Body mass index is 31.9 kg/m .  Physical Exam   GENERAL: alert and no distress  EYES: Eyes grossly normal to inspection, PERRL and conjunctivae and sclerae normal  HENT: ear canals and TM's normal, nose and mouth without ulcers or lesions  NECK: no adenopathy, no asymmetry, masses, or scars and thyroid normal to palpation  RESP: lungs clear to auscultation - no rales, rhonchi or wheezes  CV: regular rate and rhythm, normal S1 S2, no S3 or S4, no murmur, click or rub, no peripheral edema and peripheral pulses strong  MS: no gross musculoskeletal  defects noted, no edema  SKIN: no suspicious lesions or rashes  NEURO: Normal strength and tone, mentation intact and speech normal  PSYCH: mentation appears normal, affect normal/bright    Diagnostic Test Results:  Labs reviewed in [facility]    Ekg- Sinus  Rhythm   WITHIN NORMAL LIMITS    Assessment & Plan     1. Benign essential hypertension  He did not ever follow up after initially being put on losaratan 12.5 mg 6 months ago he takes his medication every day.   It is not well controlled. He states his bp is always higher in clinic and has been under increased stress but at home they are still running >150, >100. He feels tired at times. Denies chest pain sob.     Ordered BMP. Advised he is due for full physical.   - losartan (COZAAR) 25 MG tablet; Take 1 tablet (25 mg) by mouth daily  Dispense: 30 tablet; Refill: 0  He will take bp's at home if emergently high or other symptoms as discussed in clinic will go to ER or follow up with pcp.   We will try higher dose for 1 month and he will follow up (unless still very high then may need to return sooner)  Time spent on education, diet, exercise.     Return for BP Recheck.    SHALINI Minaya [facility]

## 2019-09-17 ENCOUNTER — OFFICE VISIT (OUTPATIENT)
Dept: FAMILY MEDICINE | Facility: CLINIC | Age: 44
End: 2019-09-17
Payer: COMMERCIAL

## 2019-09-17 ENCOUNTER — ANCILLARY PROCEDURE (OUTPATIENT)
Dept: GENERAL RADIOLOGY | Facility: CLINIC | Age: 44
End: 2019-09-17
Attending: PHYSICIAN ASSISTANT
Payer: COMMERCIAL

## 2019-09-17 VITALS
RESPIRATION RATE: 16 BRPM | HEART RATE: 109 BPM | HEIGHT: 69 IN | BODY MASS INDEX: 32.5 KG/M2 | OXYGEN SATURATION: 97 % | DIASTOLIC BLOOD PRESSURE: 104 MMHG | SYSTOLIC BLOOD PRESSURE: 148 MMHG | WEIGHT: 219.4 LBS

## 2019-09-17 DIAGNOSIS — M79.644 PAIN OF FINGER OF RIGHT HAND: ICD-10-CM

## 2019-09-17 DIAGNOSIS — I10 BENIGN ESSENTIAL HYPERTENSION: Primary | ICD-10-CM

## 2019-09-17 LAB
ALBUMIN SERPL-MCNC: 4.1 G/DL (ref 3.4–5)
ALP SERPL-CCNC: 93 U/L (ref 40–150)
ALT SERPL W P-5'-P-CCNC: 70 U/L (ref 0–70)
ANION GAP SERPL CALCULATED.3IONS-SCNC: 9 MMOL/L (ref 3–14)
AST SERPL W P-5'-P-CCNC: 30 U/L (ref 0–45)
BILIRUB SERPL-MCNC: 0.5 MG/DL (ref 0.2–1.3)
BUN SERPL-MCNC: 18 MG/DL (ref 7–30)
CALCIUM SERPL-MCNC: 9.1 MG/DL (ref 8.5–10.1)
CHLORIDE SERPL-SCNC: 106 MMOL/L (ref 94–109)
CHOLEST SERPL-MCNC: 256 MG/DL
CO2 SERPL-SCNC: 23 MMOL/L (ref 20–32)
CREAT SERPL-MCNC: 0.73 MG/DL (ref 0.66–1.25)
GFR SERPL CREATININE-BSD FRML MDRD: >90 ML/MIN/{1.73_M2}
GLUCOSE SERPL-MCNC: 111 MG/DL (ref 70–99)
HDLC SERPL-MCNC: 41 MG/DL
LDLC SERPL CALC-MCNC: 180 MG/DL
NONHDLC SERPL-MCNC: 215 MG/DL
POTASSIUM SERPL-SCNC: 3.6 MMOL/L (ref 3.4–5.3)
PROT SERPL-MCNC: 8 G/DL (ref 6.8–8.8)
SODIUM SERPL-SCNC: 138 MMOL/L (ref 133–144)
TRIGL SERPL-MCNC: 173 MG/DL

## 2019-09-17 PROCEDURE — 99214 OFFICE O/P EST MOD 30 MIN: CPT | Performed by: PHYSICIAN ASSISTANT

## 2019-09-17 PROCEDURE — 80053 COMPREHEN METABOLIC PANEL: CPT | Performed by: PHYSICIAN ASSISTANT

## 2019-09-17 PROCEDURE — 80061 LIPID PANEL: CPT | Performed by: PHYSICIAN ASSISTANT

## 2019-09-17 PROCEDURE — 36415 COLL VENOUS BLD VENIPUNCTURE: CPT | Performed by: PHYSICIAN ASSISTANT

## 2019-09-17 PROCEDURE — 73130 X-RAY EXAM OF HAND: CPT | Mod: RT

## 2019-09-17 RX ORDER — LOSARTAN POTASSIUM 50 MG/1
50 TABLET ORAL DAILY
Qty: 30 TABLET | Refills: 0 | Status: SHIPPED | OUTPATIENT
Start: 2019-09-17 | End: 2019-10-08 | Stop reason: ALTCHOICE

## 2019-09-17 ASSESSMENT — MIFFLIN-ST. JEOR: SCORE: 1880.57

## 2019-09-17 ASSESSMENT — ANXIETY QUESTIONNAIRES
7. FEELING AFRAID AS IF SOMETHING AWFUL MIGHT HAPPEN: NOT AT ALL
6. BECOMING EASILY ANNOYED OR IRRITABLE: NOT AT ALL
1. FEELING NERVOUS, ANXIOUS, OR ON EDGE: NOT AT ALL
3. WORRYING TOO MUCH ABOUT DIFFERENT THINGS: NOT AT ALL
5. BEING SO RESTLESS THAT IT IS HARD TO SIT STILL: NOT AT ALL
IF YOU CHECKED OFF ANY PROBLEMS ON THIS QUESTIONNAIRE, HOW DIFFICULT HAVE THESE PROBLEMS MADE IT FOR YOU TO DO YOUR WORK, TAKE CARE OF THINGS AT HOME, OR GET ALONG WITH OTHER PEOPLE: NOT DIFFICULT AT ALL
GAD7 TOTAL SCORE: 0
2. NOT BEING ABLE TO STOP OR CONTROL WORRYING: NOT AT ALL

## 2019-09-17 ASSESSMENT — PATIENT HEALTH QUESTIONNAIRE - PHQ9
5. POOR APPETITE OR OVEREATING: NOT AT ALL
SUM OF ALL RESPONSES TO PHQ QUESTIONS 1-9: 0

## 2019-09-17 NOTE — PROGRESS NOTES
Subjective     Rik Lyons is a 43 year old male who presents to clinic today for the following health issues:    History of Present Illness        Hypertension: He presents for follow up of hypertension.  He does check blood pressure  regularly outside of the clinic. Outside blood pressures have been over 140/90. He does not follow a low salt diet.      Pt has been monitoring bp readings at home - bp's have not been in normal range       Also: Finger pain - right 4th digit. No injury. Swelling, can feel pain into the palm occasionally as well - 3 wks or pain. No known injury. 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 Decreased range of motion.       Patient Active Problem List   Diagnosis     Headache     Cervicalgia     Benign essential hypertension     Chronic bilateral low back pain with bilateral sciatica     Past Surgical History:   Procedure Laterality Date     APPENDECTOMY         Social History     Tobacco Use     Smoking status: Former Smoker     Types: Cigarettes     Smokeless tobacco: Never Used   Substance Use Topics     Alcohol use: Yes     Comment: Occ     Family History   Problem Relation Age of Onset     Other Cancer Mother      Coronary Artery Disease Father         age 53     Diabetes Father          Current Outpatient Medications   Medication Sig Dispense Refill     fish oil-omega-3 fatty acids 1000 MG capsule Take 2 g by mouth daily       Loratadine (CLARITIN PO)        losartan (COZAAR) 25 MG tablet Take 1 tablet (25 mg) by mouth daily 30 tablet 0     losartan (COZAAR) 50 MG tablet Take 1 tablet (50 mg) by mouth daily 30 tablet 0     No Known Allergies  BP Readings from Last 3 Encounters:   09/17/19 (!) 148/104   08/19/19 (!) 168/109   02/21/19 (!) 147/102    Wt Readings from Last 3 Encounters:   09/17/19 99.5 kg (219 lb 6.4 oz)   08/19/19 98 kg (216 lb)   06/27/18 98.9 kg (218 lb)           Reviewed and updated as needed this visit by Provider  Tobacco  Allergies  Meds   "Problems  Med Hx  Surg Hx  Fam Hx         Review of Systems   ROS COMP: Constitutional, HEENT, cardiovascular, pulmonary, gi and gu systems are negative, except as otherwise noted.      Objective    BP (!) 148/104   Pulse 109   Resp 16   Ht 1.753 m (5' 9\")   Wt 99.5 kg (219 lb 6.4 oz)   SpO2 97%   BMI 32.40 kg/m    Body mass index is 32.4 kg/m .  Physical Exam   GENERAL: healthy, alert and no distress  RESP: lungs clear to auscultation - no rales, rhonchi or wheezes  CV: regular rate and rhythm, normal S1 S2, no S3 or S4, no murmur, click or rub, no peripheral edema and peripheral pulses strong  Right 4th finger with proximal phalanx swelling and tenderness. full range of motion of all joints. 5/5 strength. Neurovascularly Intact Distally.      Diagnostic Test Results:  Labs reviewed in Epic  Xray - right hand  neg pending radiology         Assessment & Plan       ICD-10-CM    1. Benign essential hypertension I10 losartan (COZAAR) 50 MG tablet     Lipid panel reflex to direct LDL Fasting     Comprehensive metabolic panel   2. Pain of finger of right hand M79.644 XR Hand Right G/E 3 Views     1. Increase cozaar to 50mg daily. Recheck 2 wks.     2. Discussed PHYSICAL THERAPY and if not improving to follow up  With ortho.   Ok for Activity modification. Active range of motion exercises encouraged, Ice or heat 20 mins every 2-3 hrs as needed. Ibuprofen 600-800 mg three times daily or Aleve 200-400 mg twice daily     Return in about 2 weeks (around 10/1/2019) for BP Recheck.    Ankit Schulte PA-C  [facility]      "

## 2019-09-18 ASSESSMENT — ANXIETY QUESTIONNAIRES: GAD7 TOTAL SCORE: 0

## 2019-09-19 ENCOUNTER — TELEPHONE (OUTPATIENT)
Dept: FAMILY MEDICINE | Facility: CLINIC | Age: 44
End: 2019-09-19

## 2019-09-19 NOTE — TELEPHONE ENCOUNTER
Panel Management Review      Patient has the following on his problem list:     Hypertension   Last three blood pressure readings:  BP Readings from Last 3 Encounters:   09/17/19 (!) 148/104   08/19/19 (!) 168/109   02/21/19 (!) 147/102     Blood pressure: FAILED    HTN Guidelines:  Less than 140/90      Composite cancer screening  Chart review shows that this patient is due/due soon for the following None  Summary:    Patient is due/failing the following:   BP CHECK    Action needed:   None, was just seen 9/17/19 and to follow up in 2 weeks    Type of outreach:    none    Questions for provider review:    None                                                                                                                                    Louisa Charles CMA       Chart routed to closed .

## 2019-09-21 ENCOUNTER — OFFICE VISIT (OUTPATIENT)
Dept: ORTHOPEDICS | Facility: CLINIC | Age: 44
End: 2019-09-21
Payer: COMMERCIAL

## 2019-09-21 VITALS
HEIGHT: 69 IN | DIASTOLIC BLOOD PRESSURE: 78 MMHG | WEIGHT: 219 LBS | BODY MASS INDEX: 32.44 KG/M2 | SYSTOLIC BLOOD PRESSURE: 132 MMHG

## 2019-09-21 DIAGNOSIS — M79.644 PAIN OF FINGER OF RIGHT HAND: Primary | ICD-10-CM

## 2019-09-21 PROCEDURE — 99203 OFFICE O/P NEW LOW 30 MIN: CPT | Performed by: PEDIATRICS

## 2019-09-21 ASSESSMENT — MIFFLIN-ST. JEOR: SCORE: 1878.76

## 2019-09-21 NOTE — PROGRESS NOTES
Sports Medicine Clinic Visit    PCP: Darrell Jones CASEY Lyons is a 43 year old male who is seen  as a self referral presenting with right ring finger pain and swelling.  Pain over the PIP joint.  No known injury.  He noticed the pain and swelling about 1 month ago.  He has had thumb  finger in the past, and states this does not feel similar at all.   He did recently have x rays done  He is right hand dominant.      Injury: insidious onset   **  Pain originates in palmar base of ring finger, proximal to finger; ongoing swelling in ring finger.  Unable to fully extend ring finger MCP and PIP joints. Also quite limited with flexion MCP and PIP joints.  Does have callus at base of of finger, but also at other fingers, and not new; has from work.    Using ibuprofen, not much benefit. Took 600 mg this am.      Location of Pain: right ring finger   Duration of Pain: 1 month(s)  Rating of Pain at worst: 8/10  Rating of Pain Currently: 0/10  Symptoms are better with: Rest  Symptoms are worse with: use   Additional Features:   Positive: swelling   Negative: bruising, popping, grinding, catching, locking, instability, paresthesias, numbness, weakness, pain in other joints and systemic symptoms  Other evaluation and/or treatments so far consists of: x rays   Prior History of related problems: denies for that finger     Social History:      Review of Systems  Musculoskeletal: as above  Remainder of review of systems is negative including constitutional, CV, pulmonary, GI, Skin and Neurologic except as noted in HPI or medical history.    Past Medical History:   Diagnosis Date     NO ACTIVE PROBLEMS      Past Surgical History:   Procedure Laterality Date     APPENDECTOMY       Family History   Problem Relation Age of Onset     Other Cancer Mother      Coronary Artery Disease Father         age 53     Diabetes Father      Social History     Socioeconomic History     Marital status:      " Spouse name: Not on file     Number of children: Not on file     Years of education: Not on file     Highest education level: Not on file   Occupational History     Not on file   Social Needs     Financial resource strain: Not on file     Food insecurity:     Worry: Not on file     Inability: Not on file     Transportation needs:     Medical: Not on file     Non-medical: Not on file   Tobacco Use     Smoking status: Former Smoker     Types: Cigarettes     Smokeless tobacco: Never Used   Substance and Sexual Activity     Alcohol use: Yes     Comment: Occ     Drug use: No     Sexual activity: Yes     Partners: Female   Lifestyle     Physical activity:     Days per week: Not on file     Minutes per session: Not on file     Stress: Not on file   Relationships     Social connections:     Talks on phone: Not on file     Gets together: Not on file     Attends Sabianist service: Not on file     Active member of club or organization: Not on file     Attends meetings of clubs or organizations: Not on file     Relationship status: Not on file     Intimate partner violence:     Fear of current or ex partner: Not on file     Emotionally abused: Not on file     Physically abused: Not on file     Forced sexual activity: Not on file   Other Topics Concern     Parent/sibling w/ CABG, MI or angioplasty before 65F 55M? No   Social History Narrative     Not on file       Objective  /78   Ht 1.753 m (5' 9\")   Wt 99.3 kg (219 lb)   BMI 32.34 kg/m      GENERAL APPEARANCE: healthy, alert and no distress   GAIT: NORMAL  SKIN: no suspicious lesions or rashes  NEURO: Normal strength and tone, mentation intact and speech normal  PSYCH:  mentation appears normal and affect normal/bright  HEENT: no scleral icterus  CV: distal perfusion intact  RESP: nonlabored breathing    Exam:  Hand/wrist (right):    Inspection:  No deformity noted.  Mild diffuse ring finger swelling proximally. No ecchymosis.    Motion:  Forearm pronation , forearm " supination full.  Wrist motion full  Digit motion mild to moderate limitation composite flexion, and mild limitation endrange extension at ring finger; tightness appears more at the MCP and then PIP joints  Pain with ring finger motion  Remainder of digit motion grossly full    Strength:  Able to resist with flexion and extension at the ring finger, some discomfort with each    Sensation:  Grossly intact .    Radial pulses normal, +2/4, capillary refill brisk.    Palpation:  Tender base of ring finger, more palmar aspect; no clear palpable nodule along the course of flexor tendon  Nontender remainder    No triggering noted with active motion    Skin:       well perfused       capillary refill brisk    Radiology:  Visualized radiographs of right hand 9/17/2018, and reviewed the images with the patient.  Impression: Calcific densities at the radial and palmar base of the ring finger.  No fracture identified.    Results for orders placed or performed in visit on 09/17/19   XR Hand Right G/E 3 Views    Narrative    XR HAND RT G/E 3 VW 9/17/2019 7:53 AM     HISTORY: Pain of finger of right hand      Impression    IMPRESSION: Negative exam.    ALDAIR ELENA MD       Assessment:  1. Pain of finger of right hand         Plan:  Discussed the assessment with the patient.  Symptoms similar to those seen with trigger finger, though no actual triggering.  Flexor tendinitis certainly consideration.  With x-ray findings, additional consideration may be pseudogout.  We discussed the following: symptom treatment, activity modification/rest, imaging, rehab, injection therapy, medication, support for the affected area and lab studies. Following discussion, plan:  Topical Treatments: Ice  Over the counter medication: PRN; NSAID use discussed.  Briefly discussed consideration of oral steroid.  X-ray of the area reviewed.  Activity Modification: Discussed what to alter/avoid  Medical Equipment: Reviewed options with juan eugene,  splint, may use for comfort as desired  Follow up: Call with lab results.  Future considerations: Steroid injection (course of flexor tendon, possibly MCP joint ring finger; consider ultrasound guidance), therapy, medication.  Questions answered. The patient indicates understanding of these issues and agrees with the plan.    Rashid Ruiz DO, CAQ          Patient Instructions   Labs ordered.  Can be done at any [facility] facility  Please call to schedule.  [facility]  Client Services  [phone]  The office will contact you with the results.  If you have not heard in 5 days post lab draw, please contact us at       Disclaimer: This note consists of symbols derived from keyboarding, dictation and/or voice recognition software. As a result, there may be errors in the script that have gone undetected. Please consider this when interpreting information found in this chart.

## 2019-09-21 NOTE — LETTER
9/21/2019         RE: Rik Lyons  [address] [facility] [address]        Dear Colleague,    Thank you for referring your patient, Rik Lyons, to the [facility]. Please see a copy of my visit note below.    Sports Medicine Clinic Visit    PCP: Darrell Jones    Rik Lyons is a 43 year old male who is seen  as a self referral presenting with right ring finger pain and swelling.  Pain over the PIP joint.  No known injury.  He noticed the pain and swelling about 1 month ago.  He has had thumb  finger in the past, and states this does not feel similar at all.   He did recently have x rays done  He is right hand dominant.      Injury: insidious onset   **  Pain originates in palmar base of ring finger, proximal to finger; ongoing swelling in ring finger.  Unable to fully extend ring finger MCP and PIP joints. Also quite limited with flexion MCP and PIP joints.  Does have callus at base of of finger, but also at other fingers, and not new; has from work.    Using ibuprofen, not much benefit. Took 600 mg this am.      Location of Pain: right ring finger   Duration of Pain: 1 month(s)  Rating of Pain at worst: 8/10  Rating of Pain Currently: 0/10  Symptoms are better with: Rest  Symptoms are worse with: use   Additional Features:   Positive: swelling   Negative: bruising, popping, grinding, catching, locking, instability, paresthesias, numbness, weakness, pain in other joints and systemic symptoms  Other evaluation and/or treatments so far consists of: x rays   Prior History of related problems: denies for that finger     Social History:      Review of Systems  Musculoskeletal: as above  Remainder of review of systems is negative including constitutional, CV, pulmonary, GI, Skin and Neurologic except as noted in HPI or medical history.    Past Medical History:   Diagnosis Date     NO ACTIVE PROBLEMS      Past Surgical History:   Procedure  "Laterality Date     APPENDECTOMY       Family History   Problem Relation Age of Onset     Other Cancer Mother      Coronary Artery Disease Father         age 53     Diabetes Father      Social History     Socioeconomic History     Marital status:      Spouse name: Not on file     Number of children: Not on file     Years of education: Not on file     Highest education level: Not on file   Occupational History     Not on file   Social Needs     Financial resource strain: Not on file     Food insecurity:     Worry: Not on file     Inability: Not on file     Transportation needs:     Medical: Not on file     Non-medical: Not on file   Tobacco Use     Smoking status: Former Smoker     Types: Cigarettes     Smokeless tobacco: Never Used   Substance and Sexual Activity     Alcohol use: Yes     Comment: Occ     Drug use: No     Sexual activity: Yes     Partners: Female   Lifestyle     Physical activity:     Days per week: Not on file     Minutes per session: Not on file     Stress: Not on file   Relationships     Social connections:     Talks on phone: Not on file     Gets together: Not on file     Attends Voodoo service: Not on file     Active member of club or organization: Not on file     Attends meetings of clubs or organizations: Not on file     Relationship status: Not on file     Intimate partner violence:     Fear of current or ex partner: Not on file     Emotionally abused: Not on file     Physically abused: Not on file     Forced sexual activity: Not on file   Other Topics Concern     Parent/sibling w/ CABG, MI or angioplasty before 65F 55M? No   Social History Narrative     Not on file       Objective  /78   Ht 1.753 m (5' 9\")   Wt 99.3 kg (219 lb)   BMI 32.34 kg/m       GENERAL APPEARANCE: healthy, alert and no distress   GAIT: NORMAL  SKIN: no suspicious lesions or rashes  NEURO: Normal strength and tone, mentation intact and speech normal  PSYCH:  mentation appears normal and affect " normal/bright  HEENT: no scleral icterus  CV: distal perfusion intact  RESP: nonlabored breathing    Exam:  Hand/wrist (right):    Inspection:  No deformity noted.  Mild diffuse ring finger swelling proximally. No ecchymosis.    Motion:  Forearm pronation , forearm supination full.  Wrist motion full  Digit motion mild to moderate limitation composite flexion, and mild limitation endrange extension at ring finger; tightness appears more at the MCP and then PIP joints  Pain with ring finger motion  Remainder of digit motion grossly full    Strength:  Able to resist with flexion and extension at the ring finger, some discomfort with each    Sensation:  Grossly intact .    Radial pulses normal, +2/4, capillary refill brisk.    Palpation:  Tender base of ring finger, more palmar aspect; no clear palpable nodule along the course of flexor tendon  Nontender remainder    No triggering noted with active motion    Skin:       well perfused       capillary refill brisk    Radiology:  Visualized radiographs of right hand 9/17/2018, and reviewed the images with the patient.  Impression: Calcific densities at the radial and palmar base of the ring finger.  No fracture identified.    Results for orders placed or performed in visit on 09/17/19   XR Hand Right G/E 3 Views    Narrative    XR HAND RT G/E 3 VW 9/17/2019 7:53 AM     HISTORY: Pain of finger of right hand      Impression    IMPRESSION: Negative exam.    ALDAIR ELENA MD       Assessment:  1. Pain of finger of right hand         Plan:  Discussed the assessment with the patient.  Symptoms similar to those seen with trigger finger, though no actual triggering.  Flexor tendinitis certainly consideration.  With x-ray findings, additional consideration may be pseudogout.  We discussed the following: symptom treatment, activity modification/rest, imaging, rehab, injection therapy, medication, support for the affected area and lab studies. Following discussion, plan:  Topical  Treatments: Ice  Over the counter medication: PRN; NSAID use discussed.  Briefly discussed consideration of oral steroid.  X-ray of the area reviewed.  Activity Modification: Discussed what to alter/avoid  Medical Equipment: Reviewed options with juan taping, splint, may use for comfort as desired  Follow up: Call with lab results.  Future considerations: Steroid injection (course of flexor tendon, possibly MCP joint ring finger; consider ultrasound guidance), therapy, medication.  Questions answered. The patient indicates understanding of these issues and agrees with the plan.    Rashid Ruiz DO, CAQ          Patient Instructions   Labs ordered.  Can be done at any [facility] facility  Please call to schedule.  [facility]  Client Services  [phone]  The office will contact you with the results.  If you have not heard in 5 days post lab draw, please contact us at       Disclaimer: This note consists of symbols derived from keyboarding, dictation and/or voice recognition software. As a result, there may be errors in the script that have gone undetected. Please consider this when interpreting information found in this chart.          Again, thank you for allowing me to participate in the care of your patient.        Sincerely,        Rashid Ruiz DO

## 2019-09-21 NOTE — PATIENT INSTRUCTIONS
Labs ordered.  Can be done at any [facility] facility  Please call to schedule.  [facility]  Client Services  [phone]  The office will contact you with the results.  If you have not heard in 5 days post lab draw, please contact us at

## 2019-09-25 DIAGNOSIS — M79.644 PAIN OF FINGER OF RIGHT HAND: ICD-10-CM

## 2019-09-25 LAB
BASOPHILS # BLD AUTO: 0 10E9/L (ref 0–0.2)
BASOPHILS NFR BLD AUTO: 0.5 %
CRP SERPL-MCNC: 4.7 MG/L (ref 0–8)
DIFFERENTIAL METHOD BLD: NORMAL
EOSINOPHIL # BLD AUTO: 0.4 10E9/L (ref 0–0.7)
EOSINOPHIL NFR BLD AUTO: 4.2 %
ERYTHROCYTE [DISTWIDTH] IN BLOOD BY AUTOMATED COUNT: 13 % (ref 10–15)
ERYTHROCYTE [SEDIMENTATION RATE] IN BLOOD BY WESTERGREN METHOD: 9 MM/H (ref 0–15)
HCT VFR BLD AUTO: 43 % (ref 40–53)
HGB BLD-MCNC: 15 G/DL (ref 13.3–17.7)
LYMPHOCYTES # BLD AUTO: 2.4 10E9/L (ref 0.8–5.3)
LYMPHOCYTES NFR BLD AUTO: 28.8 %
MCH RBC QN AUTO: 29.6 PG (ref 26.5–33)
MCHC RBC AUTO-ENTMCNC: 34.9 G/DL (ref 31.5–36.5)
MCV RBC AUTO: 85 FL (ref 78–100)
MONOCYTES # BLD AUTO: 0.7 10E9/L (ref 0–1.3)
MONOCYTES NFR BLD AUTO: 8.3 %
NEUTROPHILS # BLD AUTO: 4.8 10E9/L (ref 1.6–8.3)
NEUTROPHILS NFR BLD AUTO: 58.2 %
PLATELET # BLD AUTO: 289 10E9/L (ref 150–450)
RBC # BLD AUTO: 5.07 10E12/L (ref 4.4–5.9)
URATE SERPL-MCNC: 5.7 MG/DL (ref 3.5–7.2)
WBC # BLD AUTO: 8.3 10E9/L (ref 4–11)

## 2019-09-25 PROCEDURE — 85652 RBC SED RATE AUTOMATED: CPT | Performed by: PEDIATRICS

## 2019-09-25 PROCEDURE — 85025 COMPLETE CBC W/AUTO DIFF WBC: CPT | Performed by: PEDIATRICS

## 2019-09-25 PROCEDURE — 36415 COLL VENOUS BLD VENIPUNCTURE: CPT | Performed by: PEDIATRICS

## 2019-09-25 PROCEDURE — 86140 C-REACTIVE PROTEIN: CPT | Performed by: PEDIATRICS

## 2019-09-25 PROCEDURE — 84550 ASSAY OF BLOOD/URIC ACID: CPT | Performed by: PEDIATRICS

## 2019-09-30 ENCOUNTER — TELEPHONE (OUTPATIENT)
Dept: ORTHOPEDICS | Facility: CLINIC | Age: 44
End: 2019-09-30

## 2019-09-30 DIAGNOSIS — M79.644 PAIN OF FINGER OF RIGHT HAND: Primary | ICD-10-CM

## 2019-09-30 NOTE — TELEPHONE ENCOUNTER
Labs normal; not an infectious or significant inflammatory cause.  Update on symptoms? In particular, any triggering?  We discussed potential oral steroid vs injection; also possibly therapy.  If persistent issues, and interested in injection, would offer to set him up with Dr Maki or Dr Mckoy, for US evaluation of area. Would include eval MCP joint for possible effusion. Otherwise, likely injection flexor tendon sheath as typically done for trigger finger; treat like flexor tendinitis. Otherwise, can try oral steroid. If oral medication, would need pharmacy preference.  Alternative is trial hand therapy, if interested. Otherwise, anticipate likely injection.  Thanks.  Rashid Ruiz, DO, CAQ

## 2019-09-30 NOTE — TELEPHONE ENCOUNTER
Status:  Final result   Visible to patient:  Yes (MyChart) Dx:  Pain of finger of right hand    Ref Range & Units 5d ago   CRP Inflammation 0.0 - 8.0 mg/L 4.7    Resulting Agency  [facility]         Specimen Collected: 09/25/19  7:37 AM Last Resulted: 09/25/19 12:05 PM         Lab Flowsheet       Order Details       View Encounter       Lab and Collection Details       Routing       Result History               Uric acid   Order: [id number]   Status:  Final result   Visible to patient:  Yes (MyChart) Dx:  Pain of finger of right hand    Ref Range & Units 5d ago   Uric Acid 3.5 - 7.2 mg/dL 5.7    Resulting Agency  [facility]         Specimen Collected: 09/25/19  7:37 AM Last Resulted: 09/25/19 12:03 PM         Lab Flowsheet       Order Details       View Encounter       Lab and Collection Details       Routing       Result History               Erythrocyte sedimentation rate auto   Order: [id number]   Status:  Final result   Visible to patient:  Yes ([place]) Dx:  Pain of finger of right hand    Ref Range & Units 5d ago   Sed Rate 0 - 15 mm/h 9    Resulting Agency  [facility]         Specimen Collected: 09/25/19  7:37 AM Last Resulted: 09/25/19  8:09 AM         Lab Flowsheet       Order Details       View Encounter       Lab and Collection Details       Routing       Result History               CBC with platelets differential   Order: [id number]   Status:  Final result   Visible to patient:  Yes ([place]) Dx:  Pain of finger of right hand    Ref Range & Units 5d ago 1yr ago 4yr ago   WBC 4.0 - 11.0 10e9/L 8.3  6.3  7.6    RBC Count 4.4 - 5.9 10e12/L 5.07  5.37  5.28    Hemoglobin 13.3 - 17.7 g/dL 15.0  16.2  15.8    Hematocrit 40.0 - 53.0 % 43.0  45.8  45.3    MCV 78 - 100 fl 85  85  86    MCH 26.5 - 33.0 pg 29.6  30.2  29.9    MCHC 31.5 - 36.5 g/dL 34.9  35.4  34.9    RDW 10.0 - 15.0 % 13.0  12.8  12.3    Platelet Count 150 - 450 10e9/L 289  282  261    % Neutrophils % 58.2  58.9  68.4    % Lymphocytes % 28.8  28.6  23.7    % Monocytes % 8.3   7.8  6.1    % Eosinophils % 4.2  4.1  1.3    % Basophils % 0.5  0.6  0.5    Absolute Neutrophil 1.6 - 8.3 10e9/L 4.8  3.7  5.2    Absolute Lymphocytes 0.8 - 5.3 10e9/L 2.4  1.8  1.8    Absolute Monocytes 0.0 - 1.3 10e9/L 0.7  0.5  0.5    Absolute Eosinophils 0.0 - 0.7 10e9/L 0.4  0.3  0.1    Absolute Basophils 0.0 - 0.2 10e9/L 0.0  0.0  0.0    Diff Method  Automated Method  Automated Method  Automated Method    [facility]ing Agency  [facility]         Specimen Collected: 09/25/19  7:37 AM Last Resulted: 09/25/19  7:59 AM         Lab Flowsheet       Order Details       View Encounter       Lab and Collection Details       Routing       Result History

## 2019-09-30 NOTE — TELEPHONE ENCOUNTER
Called and spoke with patient.  Continues to have soreness and inflammation, but no triggering.  He would like to try the oral steroid to start with.  Pharmacy selected, please place script.    Carole Monroe MS ATC

## 2019-10-03 RX ORDER — METHYLPREDNISOLONE 4 MG
TABLET, DOSE PACK ORAL
Qty: 21 TABLET | Refills: 0 | Status: SHIPPED | OUTPATIENT
Start: 2019-10-03 | End: 2019-10-08

## 2019-10-03 NOTE — TELEPHONE ENCOUNTER
Medrol dose pack rx placed.  Contact clinic 5-7 days after completing medication with update on course, sooner prn.  Thanks.  Rashid Ruiz DO, NOEL

## 2019-10-08 ENCOUNTER — OFFICE VISIT (OUTPATIENT)
Dept: FAMILY MEDICINE | Facility: CLINIC | Age: 44
End: 2019-10-08
Payer: COMMERCIAL

## 2019-10-08 VITALS
OXYGEN SATURATION: 97 % | RESPIRATION RATE: 16 BRPM | BODY MASS INDEX: 32.73 KG/M2 | HEIGHT: 69 IN | HEART RATE: 91 BPM | WEIGHT: 221 LBS | DIASTOLIC BLOOD PRESSURE: 98 MMHG | SYSTOLIC BLOOD PRESSURE: 142 MMHG

## 2019-10-08 DIAGNOSIS — I10 BENIGN ESSENTIAL HYPERTENSION: Primary | ICD-10-CM

## 2019-10-08 PROCEDURE — 99214 OFFICE O/P EST MOD 30 MIN: CPT | Performed by: PHYSICIAN ASSISTANT

## 2019-10-08 RX ORDER — LOSARTAN POTASSIUM 100 MG/1
100 TABLET ORAL DAILY
Qty: 30 TABLET | Refills: 0 | Status: SHIPPED | OUTPATIENT
Start: 2019-10-08 | End: 2019-10-22

## 2019-10-08 ASSESSMENT — MIFFLIN-ST. JEOR: SCORE: 1887.83

## 2019-10-08 NOTE — PROGRESS NOTES
"Jacob Lyons is a 43 year old male who presents to clinic today for the following health issues:    History of Present Illness        Hypertension: He presents for follow up of hypertension.  He does check blood pressure  regularly outside of the clinic. Outside blood pressures have been over 140/90. He does not follow a low salt diet.        Patient Active Problem List   Diagnosis     Headache     Cervicalgia     Benign essential hypertension     Chronic bilateral low back pain with bilateral sciatica     Past Surgical History:   Procedure Laterality Date     APPENDECTOMY         Social History     Tobacco Use     Smoking status: Former Smoker     Types: Cigarettes     Smokeless tobacco: Never Used   Substance Use Topics     Alcohol use: Yes     Comment: Occ     Family History   Problem Relation Age of Onset     Other Cancer Mother      Coronary Artery Disease Father         age 53     Diabetes Father          Current Outpatient Medications   Medication Sig Dispense Refill     fish oil-omega-3 fatty acids 1000 MG capsule Take 2 g by mouth daily       Loratadine (CLARITIN PO)        losartan (COZAAR) 100 MG tablet Take 1 tablet (100 mg) by mouth daily 30 tablet 0     No Known Allergies  BP Readings from Last 3 Encounters:   10/08/19 (!) 142/98   09/21/19 132/78   09/17/19 (!) 148/104    Wt Readings from Last 3 Encounters:   10/08/19 100.2 kg (221 lb)   09/21/19 99.3 kg (219 lb)   09/17/19 99.5 kg (219 lb 6.4 oz)               Reviewed and updated as needed this visit by Provider  Tobacco  Allergies  Meds  Problems  Med Hx  Surg Hx  Fam Hx         Review of Systems   ROS COMP: Constitutional, HEENT, cardiovascular, pulmonary, gi and gu systems are negative, except as otherwise noted.      Objective    BP (!) 142/98   Pulse 91   Resp 16   Ht 1.753 m (5' 9\")   Wt 100.2 kg (221 lb)   SpO2 97%   BMI 32.64 kg/m    Body mass index is 32.64 kg/m .  Physical Exam   GENERAL: healthy, alert and no " "distress  RESP: lungs clear to auscultation - no rales, rhonchi or wheezes  CV: regular rate and rhythm, normal S1 S2, no S3 or S4, no murmur, click or rub, no peripheral edema and peripheral pulses strong  ABDOMEN: soft, nontender, no hepatosplenomegaly, no masses and bowel sounds normal  MS: no gross musculoskeletal defects noted, no edema    Diagnostic Test Results:  Results for orders placed or performed in visit on 09/25/19   Uric acid   Result Value Ref Range    Uric Acid 5.7 3.5 - 7.2 mg/dL   Erythrocyte sedimentation rate auto   Result Value Ref Range    Sed Rate 9 0 - 15 mm/h   CRP inflammation   Result Value Ref Range    CRP Inflammation 4.7 0.0 - 8.0 mg/L   CBC with platelets differential   Result Value Ref Range    WBC 8.3 4.0 - 11.0 10e9/L    RBC Count 5.07 4.4 - 5.9 10e12/L    Hemoglobin 15.0 13.3 - 17.7 g/dL    Hematocrit 43.0 40.0 - 53.0 %    MCV 85 78 - 100 fl    MCH 29.6 26.5 - 33.0 pg    MCHC 34.9 31.5 - 36.5 g/dL    RDW 13.0 10.0 - 15.0 %    Platelet Count 289 150 - 450 10e9/L    % Neutrophils 58.2 %    % Lymphocytes 28.8 %    % Monocytes 8.3 %    % Eosinophils 4.2 %    % Basophils 0.5 %    Absolute Neutrophil 4.8 1.6 - 8.3 10e9/L    Absolute Lymphocytes 2.4 0.8 - 5.3 10e9/L    Absolute Monocytes 0.7 0.0 - 1.3 10e9/L    Absolute Eosinophils 0.4 0.0 - 0.7 10e9/L    Absolute Basophils 0.0 0.0 - 0.2 10e9/L    Diff Method Automated Method            Assessment & Plan       ICD-10-CM    1. Benign essential hypertension I10 losartan (COZAAR) 100 MG tablet   Will increase his Cozaar to 100 mg daily.  Check blood pressure in 2 weeks.  [place] working on diet and exercise and cut back on salt in his diet.  Discussed using alcohol in moderation.     BMI:   Estimated body mass index is 32.64 kg/m  as calculated from the following:    Height as of this encounter: 1.753 m (5' 9\").    Weight as of this encounter: 100.2 kg (221 lb).   Weight management plan: Discussed healthy diet and exercise guidelines    Return " in about 2 weeks (around 10/22/2019) for BP Recheck.    Ankit Schulte PA-C  [facility]

## 2019-10-22 ENCOUNTER — OFFICE VISIT (OUTPATIENT)
Dept: FAMILY MEDICINE | Facility: CLINIC | Age: 44
End: 2019-10-22
Payer: COMMERCIAL

## 2019-10-22 VITALS
TEMPERATURE: 97.3 F | SYSTOLIC BLOOD PRESSURE: 132 MMHG | RESPIRATION RATE: 14 BRPM | DIASTOLIC BLOOD PRESSURE: 90 MMHG | HEART RATE: 92 BPM | OXYGEN SATURATION: 97 % | BODY MASS INDEX: 32.58 KG/M2 | WEIGHT: 220 LBS | HEIGHT: 69 IN

## 2019-10-22 DIAGNOSIS — I10 BENIGN ESSENTIAL HYPERTENSION: ICD-10-CM

## 2019-10-22 PROCEDURE — 99213 OFFICE O/P EST LOW 20 MIN: CPT | Performed by: PHYSICIAN ASSISTANT

## 2019-10-22 RX ORDER — HYDROCHLOROTHIAZIDE 12.5 MG/1
25 TABLET ORAL DAILY
Qty: 30 TABLET | Refills: 0 | Status: CANCELLED | OUTPATIENT
Start: 2019-10-22

## 2019-10-22 RX ORDER — METOPROLOL SUCCINATE 25 MG/1
25 TABLET, EXTENDED RELEASE ORAL DAILY
Qty: 30 TABLET | Refills: 0 | Status: SHIPPED | OUTPATIENT
Start: 2019-10-22 | End: 2019-11-06

## 2019-10-22 RX ORDER — LOSARTAN POTASSIUM 100 MG/1
100 TABLET ORAL DAILY
Qty: 30 TABLET | Refills: 0 | Status: SHIPPED | OUTPATIENT
Start: 2019-10-22 | End: 2019-12-09

## 2019-10-22 ASSESSMENT — MIFFLIN-ST. JEOR: SCORE: 1883.29

## 2019-10-22 NOTE — PROGRESS NOTES
Subjective     Rik Lyons is a 43 year old male who presents to clinic today for the following health issues:      Hypertension: He presents for follow up of hypertension.  He does check blood pressure  regularly outside of the clinic. Outside blood pressures have been over 140/90. He does not follow a low salt diet.   History of Present Illness        Hypertension: He presents for follow up of hypertension.  He does check blood pressure  regularly outside of the clinic. Outside blood pressures have been over 140/90. He does not follow a low salt diet.            Patient Active Problem List   Diagnosis     Headache     Cervicalgia     Benign essential hypertension     Chronic bilateral low back pain with bilateral sciatica     Past Surgical History:   Procedure Laterality Date     APPENDECTOMY         Social History     Tobacco Use     Smoking status: Former Smoker     Types: Cigarettes     Smokeless tobacco: Never Used   Substance Use Topics     Alcohol use: Yes     Comment: Occ     Family History   Problem Relation Age of Onset     Other Cancer Mother      Coronary Artery Disease Father         age 53     Diabetes Father          Current Outpatient Medications   Medication Sig Dispense Refill     fish oil-omega-3 fatty acids 1000 MG capsule Take 2 g by mouth daily       Loratadine (CLARITIN PO)        losartan (COZAAR) 100 MG tablet Take 1 tablet (100 mg) by mouth daily 30 tablet 0     metoprolol succinate ER (TOPROL-XL) 25 MG 24 hr tablet Take 1 tablet (25 mg) by mouth daily 30 tablet 0     No Known Allergies  BP Readings from Last 3 Encounters:   10/22/19 (!) 132/90   10/08/19 (!) 142/98   09/21/19 132/78    Wt Readings from Last 3 Encounters:   10/22/19 99.8 kg (220 lb)   10/08/19 100.2 kg (221 lb)   09/21/19 99.3 kg (219 lb)                      Reviewed and updated as needed this visit by Provider  Tobacco  Allergies  Meds  Problems  Med Hx  Surg Hx  Fam Hx         Review of Systems   ROS COMP:  "Constitutional, HEENT, cardiovascular, pulmonary, gi and gu systems are negative, except as otherwise noted.      Objective    BP (!) 132/90   Pulse 92   Temp 97.3  F (36.3  C) (Oral)   Resp 14   Ht 1.753 m (5' 9\")   Wt 99.8 kg (220 lb)   SpO2 97%   BMI 32.49 kg/m    Body mass index is 32.49 kg/m .  Physical Exam   GENERAL: healthy, alert and no distress  NECK: no adenopathy, no asymmetry, masses, or scars and thyroid normal to palpation  RESP: lungs clear to auscultation - no rales, rhonchi or wheezes  CV: regular rate and rhythm, normal S1 S2, no S3 or S4, no murmur, click or rub, no peripheral edema and peripheral pulses strong  MS: no gross musculoskeletal defects noted, no edema    Diagnostic Test Results:  Labs reviewed in Epic  none         Assessment & Plan       ICD-10-CM    1. Benign essential hypertension I10 losartan (COZAAR) 100 MG tablet     metoprolol succinate ER (TOPROL-XL) 25 MG 24 hr tablet     Basic metabolic panel   We will add metoprolol 25 mg daily.  Will recheck blood pressure in 2 weeks with ancillary with labs.     BMI:   Estimated body mass index is 32.49 kg/m  as calculated from the following:    Height as of this encounter: 1.753 m (5' 9\").    Weight as of this encounter: 99.8 kg (220 lb).   Weight management plan: Discussed healthy diet and exercise guidelines      Return in about 2 weeks (around 11/5/2019) for BP check with Ancillary.    Ankit Schulte PA-C  [facility]    The 10-year ASCVD risk score ([place]op[place] MONTENEGRO Jr., et al., 2013) is: 4%    Values used to calculate the score:      Age: 43 years      Sex: Male      Is Non- : No      Diabetic: No      Tobacco smoker: No      Systolic Blood Pressure: 132 mmHg      Is BP treated: Yes      HDL Cholesterol: 41 mg/dL      Total Cholesterol: 256 mg/dL   "

## 2019-10-23 ENCOUNTER — OFFICE VISIT (OUTPATIENT)
Dept: ORTHOPEDICS | Facility: CLINIC | Age: 44
End: 2019-10-23
Payer: COMMERCIAL

## 2019-10-23 VITALS
WEIGHT: 220 LBS | HEIGHT: 69 IN | BODY MASS INDEX: 32.58 KG/M2 | DIASTOLIC BLOOD PRESSURE: 88 MMHG | SYSTOLIC BLOOD PRESSURE: 128 MMHG

## 2019-10-23 DIAGNOSIS — S66.194D: Primary | ICD-10-CM

## 2019-10-23 PROCEDURE — 99213 OFFICE O/P EST LOW 20 MIN: CPT | Performed by: FAMILY MEDICINE

## 2019-10-23 ASSESSMENT — MIFFLIN-ST. JEOR: SCORE: 1883.29

## 2019-10-23 NOTE — LETTER
"    10/23/2019         RE: Rik Lyons  [address] [facility] [address]        Dear Colleague,    Thank you for referring your patient, Rik Lyons, to the [facility] [place]. Please see a copy of my visit note below.    Rik Lyons  :  1975  DOS: 2019  MRN: [id number]    Sports Medicine Clinic Procedure    Ultrasound Guided evaluation and possible injection of Right Ring finger.    Clinical History: Patient reports gradual onset of right ring finger contracture over the past ~ 6 weeks without triggering.  Trial of medrol-dose pack provided only mild relief.      Diagnosis:   1. Other injury of flexor muscle, fascia and tendon of right ring finger at wrist and hand level, subsequent encounter      Referring Physician: Rashid Ruiz, DO  /88   Ht 1.753 m (5' 9\")   Wt 99.8 kg (220 lb)   BMI 32.49 kg/m     Physical Exam:  Limitation of terminal extension of the right 4th digit due to pain and mechanical block  TTP of the 4th flexor tendon proximal to the MCP joint, more proximal than expected for classic trigger finger  Most focal TTP is along the tendon distal to the MCP joint, at site of calcifications    Prior XR images independently visualized and reviewed with patient today in clinic    Impression:  Calcifications seen on XR are identified on bedside US as being extraarticular and appear both just deep to the tendon, and some appear to be inside the tendon sheath.  No appreciable joint effusion.  No significant tendon sheath effusion.  Splitting/tendinosis noted in the flexor tendon sheath in the area of pain.    Plan:  Discussed with Dr Ruiz  Referral placed for hand surgeon  Consideration was made for possible trial of US guided tendon sheath CSI, but we agreed to pursue further consultation  Home handouts provided and supportive care reviewed  All questions were answered today  Contact us with additional questions or concerns  Signs " and sx of concern reviewed      Rashid Maki DO, CAQ  Primary Care Sports Medicine  [facility]             Again, thank you for allowing me to participate in the care of your patient.        Sincerely,        Rashid Maki DO

## 2019-10-23 NOTE — PROGRESS NOTES
"Rik PEÑA Given  :  1975  DOS: 2019  MRN: [id number]    Sports Medicine Clinic Procedure    Ultrasound Guided evaluation and possible injection of Right Ring finger.    Clinical History: Patient reports gradual onset of right ring finger contracture over the past ~ 6 weeks without triggering.  Trial of medrol-dose pack provided only mild relief.      Diagnosis:   1. Other injury of flexor muscle, fascia and tendon of right ring finger at wrist and hand level, subsequent encounter      Referring Physician: Rashid Ruiz DO  /88   Ht 1.753 m (5' 9\")   Wt 99.8 kg (220 lb)   BMI 32.49 kg/m    Physical Exam:  Limitation of terminal extension of the right 4th digit due to pain and mechanical block  TTP of the 4th flexor tendon proximal to the MCP joint, more proximal than expected for classic trigger finger  Most focal TTP is along the tendon distal to the MCP joint, at site of calcifications    Prior XR images independently visualized and reviewed with patient today in clinic    Impression:  Calcifications seen on XR are identified on bedside US as being extraarticular and appear both just deep to the tendon, and some appear to be inside the tendon sheath.  No appreciable joint effusion.  No significant tendon sheath effusion.  Splitting/tendinosis noted in the flexor tendon sheath in the area of pain.    Plan:  Discussed with Dr Ruiz  Referral placed for hand surgeon  Consideration was made for possible trial of US guided tendon sheath CSI, but we agreed to pursue further consultation  Home handouts provided and supportive care reviewed  All questions were answered today  Contact us with additional questions or concerns  Signs and sx of concern reviewed      Rashid Maki DO, NOEL  Primary Care Sports Medicine  [facility]           "

## 2019-10-28 NOTE — TELEPHONE ENCOUNTER
DIAGNOSIS: Other injury of flexor muscle, fascia and tendon of right ring finger at wrist and hand level // xray a few weeks ago at  [place] // Ultrasound on 10/23 // scheduled per patient // surgery for trigger finger on right hand a while back   APPOINTMENT DATE: Nov 5, 2019    NOTES STATUS DETAILS   OFFICE NOTE from referring provider Internal 10/23/19 Dr. Maki   OFFICE NOTE from other specialist Internal 9/17/19 Oppel, PAULINE   DISCHARGE SUMMARY from hospital N/A    DISCHARGE REPORT from the ER N/A    OPERATIVE REPORT N/A    MEDICATION LIST Internal    IMPLANT RECORD/STICKER N/A    LABS     CBC/DIFF N/A    CULTURES N/A    INJECTIONS DONE IN RADIOLOGY N/A    MRI N/A    CT SCAN N/A    XRAYS (IMAGES & REPORTS) Internal 9/17/19   TUMOR     PATHOLOGY  Slides & report N/A

## 2019-11-02 ENCOUNTER — MYC MEDICAL ADVICE (OUTPATIENT)
Dept: UROLOGY | Facility: CLINIC | Age: 44
End: 2019-11-02

## 2019-11-05 ENCOUNTER — PRE VISIT (OUTPATIENT)
Dept: ORTHOPEDICS | Facility: CLINIC | Age: 44
End: 2019-11-05

## 2019-11-05 ENCOUNTER — THERAPY VISIT (OUTPATIENT)
Dept: OCCUPATIONAL THERAPY | Facility: CLINIC | Age: 44
End: 2019-11-05
Payer: COMMERCIAL

## 2019-11-05 ENCOUNTER — OFFICE VISIT (OUTPATIENT)
Dept: ORTHOPEDICS | Facility: CLINIC | Age: 44
End: 2019-11-05
Payer: COMMERCIAL

## 2019-11-05 DIAGNOSIS — M25.641 FINGER JOINT STIFF, RIGHT: Primary | ICD-10-CM

## 2019-11-05 DIAGNOSIS — M25.641 STIFFNESS OF FINGER JOINT, RIGHT: Primary | ICD-10-CM

## 2019-11-05 DIAGNOSIS — M79.644 PAIN OF FINGER OF RIGHT HAND: ICD-10-CM

## 2019-11-05 PROCEDURE — 97760 ORTHOTIC MGMT&TRAING 1ST ENC: CPT | Mod: GO | Performed by: OCCUPATIONAL THERAPIST

## 2019-11-05 PROCEDURE — 97110 THERAPEUTIC EXERCISES: CPT | Mod: GO | Performed by: OCCUPATIONAL THERAPIST

## 2019-11-05 PROCEDURE — 97165 OT EVAL LOW COMPLEX 30 MIN: CPT | Mod: GO | Performed by: OCCUPATIONAL THERAPIST

## 2019-11-05 NOTE — NURSING NOTE
Reason For Visit:   Chief Complaint   Patient presents with     Consult For     Other injury of flexor muscle, fascia and tendon of right ring finger at wrist and hand level - Dr. Maki referring       Primary MD: Darrell Jones  Ref. MD: Dr. Maki    ?  No    Age: 43 year old    Occupation: [employer].  Currently working? Yes.  Work status?  Full time.  Date of injury: NA  Type of injury: Unknown onset.  Date of surgery: Few years ago  Type of surgery: Right thumb trigger release.  Smoker: Yes  Request smoking cessation information: No      There were no vitals taken for this visit.      Pain Assessment  Patient Currently in Pain: Denies(Only with use of hand)               QuickDASH Assessment  No flowsheet data found.       No Known Allergies    Lissett Downs ATC

## 2019-11-05 NOTE — PROGRESS NOTES
Hand Therapy Initial Evaluation    Current Date:  11/5/2019    Diagnosis:  Right  ring finger stiffness and pain  DOI:  August 2019    Referring MD: Ty Spencer MD    Next MD visit: 11/12/19      Initial Subjective:  Rik Lyons is a 43 year old  right  hand dominant male.    Patient reports symptoms of pain, stiffness/loss of motion and weakness/loss of strength of the right ring finger which occurred due to unknown, has swelling, stiffness and had one ultrasound on the . Since onset symptoms are Unchanged  Special tests:  x-ray and Ultrasound and will have another US today.  Previous treatment: cortisone dose vanessa per [facility] MD.    General health as reported by patient is excellent.  Pertinent medical history includes:High Blood Pressure  Medical allergies:none.  Surgical history: none.  Medication history: High Blood Pressure.  Occupational Profile Information:  Current occupation is construction, hard [employer]  Currently working in normal job without restrictions, restricts self due to pain and awkwardness  Job Tasks: Lifting, Carrying, Operating a Machine, Assembly, Prolonged Sitting, Pushing, Pulling, Repetitive Tasks  Prior functional level:  no limitations  Barriers include:none  Mobility: No difficulty  Transportation: drives  Leisure activities/hobbies: taking care of kids, golf, but that really hurts, throwing a ball hurts    Functional Outcome Measure:  See flowsheet      Objective:    Pain Level (Scale 0-10):   11/5/2019   At Rest 0   With Use 7     Pain Description:  Date 11/5/2019    right   Location  ring finger   Pain Quality Sharp and Throbbing   Frequency daily     Pain is worst  daytime   Exacerbated by  use, gripping and pulling,    Relieved by rest   Progression Unchanged        ROM:    Date: 11/5/2019   ring finger  AROM  (PROM) Right       MCP  0/74   PIP  -35/60   DIP  -5/30   TROM 124       Strength:  [x]   Contraindicated  Circumference: (Measured in cm)  11/5/2019    Location: Ring     Right Left   P1 7.8 7.0   PIP     P2 6.5 6.0   DIP         Wound/Scar: none  Palpation:  []     Normal        [x]   Tender       [x]  Mild         []   Moderate []   Severe   Location: volar hand under Ring finger   Sensation:  WNL throughout all nerve distributions; per patient report      Assessment:   Patient presents with symptoms consistent with above diagnosis,  with conservative intervention.   Patient's limitations or Problem List includes:  Pain, Decreased ROM/motion, Increased edema and Decreased dexterity of the right ring finger and  which interferes with the patient's ability to perform Self Care Tasks (dressing, eating, bathing, hygiene/toileting), Work Tasks, Sleep Patterns, Recreational Activities, Household Chores and Driving  as compared to previous level of function.    Rehab Potential:  Excellent - Return to full activity, no limitations    Patient will benefit from skilled Occupational Therapy to increase ROM, overall strength,  strength, coordination and dexterity and decrease pain, edema and adherence of scarring to return to previous activity level and resume normal daily tasks and to reach their rehab potential.    Barriers to Learning:  No barrier    Communication Issues:  Patient appears to be able to clearly communicate and understand verbal and written communication and follow directions correctly.  Chart Review: Chart Review, Brief history including review of medical and/or therapy records relating to the presenting problem and Simple history review with patient    Identified Performance Deficits: bathing/showering, dressing, feeding, hygiene and grooming, care of others, home establishment and management, meal preparation and cleanup, work, play and leisure activities    Assessment of Occupational Performance:  5 or more Performance Deficits    Clinical Decision Making (Complexity): Low complexity    Treatment Explanation:  The following has been discussed with the patient:  RX  ordered/plan of care  Anticipated outcomes  Possible risks and side effects    Treatment Plan:   Frequency:  1 X week, once daily  Duration:  for 6 weeks     Therapeutic Exercise:  AROM, AAROM, PROM, Tendon Gliding, Blocking, Reverse Blocking and Extensor Tracking  Manual Techniques:  Myofascial release  Depending upon outcome of the US testing on this date: 11/5/2019  Orthotic Fabrication: Static Static progressive  finger based orthosis, volar extension gutter    Discharge Plan:  Achieve all LTG  [place] in home treatment program.  Reach maximal therapeutic benefit.  Please refer to the daily flowsheet for treatment today and total treatment time.      Home Exercise Program:  PIP and DIP blocking  Table tracking  Wear orthosis at night    Next Visit:  Progress extension gutter and exercises

## 2019-11-05 NOTE — LETTER
11/5/2019       RE: Rik Lyons  [address] [facility] [address]     Dear Colleague,    Thank you for referring your patient, Rik Lyons, to the [facility] at [facility]. Please see a copy of my visit note below.    REFERRING PROVIDER: Rashid Maki    REASON FOR CONSULTATION: Right ring finger flexor tendon injury.    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  Patient reports his symptoms have been going on for 3 months without any eliciting trauma.  He has pain at the base of the right ring finger and also around the PIP joint with overuse and when he bumps the finger.  He also reports inability to actively extend the finger at the PIP joint level.  He has been evaluated with x-ray and ultrasound.  Small amounts of calcification were found volarly to the MP joint on x-ray and ultrasound imaging showed possible splaying of the flexor tendon at the level of the proximal phalanx.  He reports some numbness along the volar aspect of the proximal phalanx.  He has so far tried a Medrol Dosepak of oral steroids.  He has not tried hand therapy.    MEDS:   Prior to Admission medications    Medication Sig Start Date End Date Taking? Authorizing Provider   fish oil-omega-3 fatty acids 1000 MG capsule Take 2 g by mouth daily   Yes Reported, Patient   Loratadine (CLARITIN PO)    Yes Reported, Patient   losartan (COZAAR) 100 MG tablet Take 1 tablet (100 mg) by mouth daily 10/22/19  Yes Ankit Schulte PA-C   metoprolol succinate ER (TOPROL-XL) 25 MG 24 hr tablet Take 1 tablet (25 mg) by mouth daily 10/22/19  Yes Ankit Schulte PA-C       ALLERGIES: No Known Allergies    PMH:   Past Medical History:   Diagnosis Date     NO ACTIVE PROBLEMS        PSH:   Past Surgical History:   Procedure  Laterality Date     APPENDECTOMY         SH:   Social History     Tobacco Use     Smoking status: Former Smoker     Types: Cigarettes     Smokeless tobacco: Never Used   Substance Use Topics     Alcohol use: Yes     Comment: Occ       FH:   Family History   Problem Relation Age of Onset     Other Cancer Mother      Coronary Artery Disease Father         age 53     Diabetes Father        ROS: Denies chest pain, shortness of breath, diabetes, MI, CVA, DVT, PE, and bleeding disorders.    PHYSICAL EXAMINATION:   General: No acute distress.  On examination of the right hand, the ring finger is swollen compared to the other fingers.  It is resting in a PIP joint flexed position at about 45 degrees.  He is unable to actively extend the PIP joint, though he is able to extend and flex the DIP joint.  I am able to passively extend the PIP joint further than he can actively extend it, though this causes him pain within the PIP joint that increases with increasing amounts extension.  He also has tenderness on palpation of the volar aspect of the metacarpophalangeal joint.  I am unable to palpate any nodule or mass.  Patient is able to actively flex his PIP and DIP joints without any tendon bowing.  There is no tenderness along palpation of the flexor tendons proximal to the palm.    IMAGING: X-ray imaging of the right hand was reviewed.  This did not reveal any gross bony abnormality.  We do not have ultrasound results.    ASSESSMENT: Right ring finger PIP joint stiffness with possible flexor tendon injury.    PLAN: Hand therapy referral for range of motion exercises and splinting.  We will also obtain her own ultrasound of the right ring finger to evaluate for flexor tendon or other ligamentous injuries.  I will review these images and see him back as needed.    Total time spent with patient was 30 min of which greater than 50% was in counseling.    Again, thank you for allowing me to participate in the care of your patient.       Sincerely,    Ty Spencer MD

## 2019-11-05 NOTE — PROGRESS NOTES
REFERRING PROVIDER: Rashid Maki    REASON FOR CONSULTATION: Right ring finger flexor tendon injury.    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  Patient reports his symptoms have been going on for 3 months without any eliciting trauma.  He has pain at the base of the right ring finger and also around the PIP joint with overuse and when he bumps the finger.  He also reports inability to actively extend the finger at the PIP joint level.  He has been evaluated with x-ray and ultrasound.  Small amounts of calcification were found volarly to the MP joint on x-ray and ultrasound imaging showed possible splaying of the flexor tendon at the level of the proximal phalanx.  He reports some numbness along the volar aspect of the proximal phalanx.  He has so far tried a Medrol Dosepak of oral steroids.  He has not tried hand therapy.    MEDS:   Prior to Admission medications    Medication Sig Start Date End Date Taking? Authorizing Provider   fish oil-omega-3 fatty acids 1000 MG capsule Take 2 g by mouth daily   Yes Reported, Patient   Loratadine (CLARITIN PO)    Yes Reported, Patient   losartan (COZAAR) 100 MG tablet Take 1 tablet (100 mg) by mouth daily 10/22/19  Yes Ankit Schulte PA-C   metoprolol succinate ER (TOPROL-XL) 25 MG 24 hr tablet Take 1 tablet (25 mg) by mouth daily 10/22/19  Yes Ankit Schulte PA-C       ALLERGIES: No Known Allergies    PMH:   Past Medical History:   Diagnosis Date     NO ACTIVE PROBLEMS        PSH:   Past Surgical History:   Procedure Laterality Date     APPENDECTOMY         SH:   Social History     Tobacco Use     Smoking status: Former Smoker     Types: Cigarettes     Smokeless tobacco: Never Used   Substance Use Topics     Alcohol use: Yes     Comment: Occ       FH:   Family History   Problem Relation Age of Onset      Other Cancer Mother      Coronary Artery Disease Father         age 53     Diabetes Father        ROS: Denies chest pain, shortness of breath, diabetes, MI, CVA, DVT, PE, and bleeding disorders.    PHYSICAL EXAMINATION:   General: No acute distress.  On examination of the right hand, the ring finger is swollen compared to the other fingers.  It is resting in a PIP joint flexed position at about 45 degrees.  He is unable to actively extend the PIP joint, though he is able to extend and flex the DIP joint.  I am able to passively extend the PIP joint further than he can actively extend it, though this causes him pain within the PIP joint that increases with increasing amounts extension.  He also has tenderness on palpation of the volar aspect of the metacarpophalangeal joint.  I am unable to palpate any nodule or mass.  Patient is able to actively flex his PIP and DIP joints without any tendon bowing.  There is no tenderness along palpation of the flexor tendons proximal to the palm.    IMAGING: X-ray imaging of the right hand was reviewed.  This did not reveal any gross bony abnormality.  We do not have ultrasound results.    ASSESSMENT: Right ring finger PIP joint stiffness with possible flexor tendon injury.    PLAN: Hand therapy referral for range of motion exercises and splinting.  We will also obtain her own ultrasound of the right ring finger to evaluate for flexor tendon or other ligamentous injuries.  I will review these images and see him back as needed.    Total time spent with patient was 30 min of which greater than 50% was in counseling.

## 2019-11-06 ENCOUNTER — ALLIED HEALTH/NURSE VISIT (OUTPATIENT)
Dept: NURSING | Facility: CLINIC | Age: 44
End: 2019-11-06
Payer: COMMERCIAL

## 2019-11-06 VITALS — DIASTOLIC BLOOD PRESSURE: 96 MMHG | OXYGEN SATURATION: 97 % | HEART RATE: 82 BPM | SYSTOLIC BLOOD PRESSURE: 146 MMHG

## 2019-11-06 DIAGNOSIS — I10 BENIGN ESSENTIAL HYPERTENSION: ICD-10-CM

## 2019-11-06 LAB
ANION GAP SERPL CALCULATED.3IONS-SCNC: 7 MMOL/L (ref 3–14)
BUN SERPL-MCNC: 14 MG/DL (ref 7–30)
CALCIUM SERPL-MCNC: 9.4 MG/DL (ref 8.5–10.1)
CHLORIDE SERPL-SCNC: 105 MMOL/L (ref 94–109)
CO2 SERPL-SCNC: 26 MMOL/L (ref 20–32)
CREAT SERPL-MCNC: 0.88 MG/DL (ref 0.66–1.25)
GFR SERPL CREATININE-BSD FRML MDRD: >90 ML/MIN/{1.73_M2}
GLUCOSE SERPL-MCNC: 109 MG/DL (ref 70–99)
POTASSIUM SERPL-SCNC: 3.6 MMOL/L (ref 3.4–5.3)
SODIUM SERPL-SCNC: 138 MMOL/L (ref 133–144)

## 2019-11-06 PROCEDURE — 36415 COLL VENOUS BLD VENIPUNCTURE: CPT | Performed by: PHYSICIAN ASSISTANT

## 2019-11-06 PROCEDURE — 99207 ZZC NO CHARGE NURSE ONLY: CPT

## 2019-11-06 PROCEDURE — 80048 BASIC METABOLIC PNL TOTAL CA: CPT | Performed by: PHYSICIAN ASSISTANT

## 2019-11-06 RX ORDER — METOPROLOL SUCCINATE 50 MG/1
50 TABLET, EXTENDED RELEASE ORAL DAILY
Qty: 30 TABLET | Refills: 0 | Status: SHIPPED | OUTPATIENT
Start: 2019-11-06 | End: 2019-12-09

## 2019-11-06 NOTE — PROGRESS NOTES
Patient reports he is taking the Metoprolol in the morning, denies fatigue, lightheadedness, dizziness.  HR 82.  Patient did take his medication this morning.  Did have 1/2 cup of coffee this morning.    BP Readings from Last 6 Encounters:   11/06/19 (!) 146/96   10/23/19 128/88   10/22/19 (!) 132/90   10/08/19 (!) 142/98   09/21/19 132/78   09/17/19 (!) 148/104     Patient does not use salt additive, but reports he does not read labels. Patient checking blood pressure at home, however has never had machine validated.  Blood pressure at home 170's/115's on avg.  Of note all family members are on high on machine.  Discussed validating the machine or purchasing a new machine.    Information above is reviewed with Ankit Schulte PA-C, Verbal Orders will increase Metoprolol XR 50 mg daily.  Follow up in 2 weeks for blood pressure check.    11/20/19.    Patient/parent verbalized understanding of instructions provided and agreed with the plan of care    Per protocol, will route encounter to be cosigned by provider for Verbal Orders.  Sarah Braswell RN

## 2019-11-12 ENCOUNTER — ANCILLARY PROCEDURE (OUTPATIENT)
Dept: ULTRASOUND IMAGING | Facility: CLINIC | Age: 44
End: 2019-11-12
Attending: PLASTIC SURGERY
Payer: COMMERCIAL

## 2019-11-12 ENCOUNTER — THERAPY VISIT (OUTPATIENT)
Dept: OCCUPATIONAL THERAPY | Facility: CLINIC | Age: 44
End: 2019-11-12
Payer: COMMERCIAL

## 2019-11-12 DIAGNOSIS — M25.641 FINGER JOINT STIFF, RIGHT: ICD-10-CM

## 2019-11-12 DIAGNOSIS — M79.644 PAIN OF FINGER OF RIGHT HAND: ICD-10-CM

## 2019-11-12 DIAGNOSIS — M25.641 STIFFNESS OF FINGER JOINT, RIGHT: ICD-10-CM

## 2019-11-12 PROCEDURE — 97110 THERAPEUTIC EXERCISES: CPT | Mod: GO | Performed by: OCCUPATIONAL THERAPIST

## 2019-11-12 PROCEDURE — 29086 APPLICATION CAST FINGER: CPT | Mod: GO | Performed by: OCCUPATIONAL THERAPIST

## 2019-11-12 NOTE — PROGRESS NOTES
SOAP note objective information for 11/12/2019.  ROM:    Date: 11/5/2019 11/12/19   AROM (PROM) Right    ring finger     MCP  0/74 0/73   PIP -35/60 -30/73  (-26/88)   DIP -5/30 0/35   TROM 124 151     Edema (Circumference measured in cm)   11/5/19 11/12/19   Ring left right    P1 7.0 7.8 7.4   PIP   7.0   P2 6.0 6.5 6.2   Please refer to the daily flowsheet for treatment today, total treatment time and time spent performing 1:1 timed codes.       Home Exercise Program:  PIP and DIP blocking  Table tracking  Tendon gliding  Wear orthosis/cast at night    Next Visit:  Progress extension gutter/cast and exercises

## 2019-11-20 ENCOUNTER — ALLIED HEALTH/NURSE VISIT (OUTPATIENT)
Dept: NURSING | Facility: CLINIC | Age: 44
End: 2019-11-20
Payer: COMMERCIAL

## 2019-11-20 VITALS — HEART RATE: 77 BPM | DIASTOLIC BLOOD PRESSURE: 80 MMHG | OXYGEN SATURATION: 97 % | SYSTOLIC BLOOD PRESSURE: 128 MMHG

## 2019-11-20 DIAGNOSIS — I10 BENIGN ESSENTIAL HYPERTENSION: Primary | ICD-10-CM

## 2019-11-20 PROCEDURE — 99207 ZZC NO CHARGE NURSE ONLY: CPT

## 2019-11-20 NOTE — PROGRESS NOTES
Rik Lyons is a 44 year old patient who comes in today for a Blood Pressure check.  Initial BP:  /80   Pulse 77   SpO2 97%      77  Disposition: results routed to provider.    BP Readings from Last 3 Encounters:   11/20/19 128/80   11/06/19 (!) 146/96   10/23/19 128/88         Karen Bran MA

## 2019-11-22 ENCOUNTER — TELEPHONE (OUTPATIENT)
Dept: ORTHOPEDICS | Facility: CLINIC | Age: 44
End: 2019-11-22

## 2019-11-22 NOTE — TELEPHONE ENCOUNTER
Right hand ultrasound was reviewed by Dr Spencer.  Patient was informed he does not have a full thickness tear in the flexor tendon.  Recommendation is to continue therapy.  Surgery is not indicated at this time.    He reports he still has trouble straightening the finger and is concerned about ongoing swelling.  He plans to continue hand therapy at the [facility]  Dr Spencer will be given update.

## 2019-11-26 ENCOUNTER — TELEPHONE (OUTPATIENT)
Dept: ORTHOPEDICS | Facility: CLINIC | Age: 44
End: 2019-11-26

## 2019-11-26 NOTE — TELEPHONE ENCOUNTER
Patient's concern about finger swelling was reviewed with Dr Spencer.  He was informed the recommendation is hand therapy.  Swelling should decrease with time.   If he has surgery the swelling will be worse so it is better to avoid if possible.  Patient verbalized understanding and is agreeable with continuing with hand therapy.  He was encouraged to call with an update in 4-6 weeks.

## 2019-12-08 ENCOUNTER — DOCUMENTATION ONLY (OUTPATIENT)
Dept: UROLOGY | Facility: CLINIC | Age: 44
End: 2019-12-08

## 2019-12-08 NOTE — LETTER
[facility]  [address] [address]  [phone]          December 9, 2019    Rik Lyons                                                                                                                     [address] [facility] [address]            Dear Rik,    You are overdue for your post-vasectomy semen analysis.     [facility]   VASECTOMY POST-OPERATIVE   SPERM COLLECTION INSTRUCTIONS    You must consider yourself fertile after your vasectomy until sperm counts establish the fact that you are sterile. Abstain from ejaculation 2-7 days prior to collection.     Patient instructions: Collection of semen for Post-Vasectomy analysis.  1. Obtain a clean sterile specimen container for collection of the sperm specimen. A sterile urine container is optimal. This may be obtained from the laboratory or your provider  2. Prior to collection wash your hands and clean the head of your penis. Collect the semen sample by masturbation into the sterile specimen container that has been provided. Do not use any lubricant during masturbation. Your partner may help in sample production but only be masturbation.   3. Label the outside of the container with your name, date of birth, and date and time of collection   4. Please call with the [facility] [phone] or [place] [phone] lab to schedule your appointment when you know you are bringing the specimen.   5. When transporting the sample to the laboratory keep the specimen cup warmed to body temperature; this can best be accomplished by keeping the container in contact with your skin, under several layers of clothing. It is not sufficient to place in a jacket pocket.   6. Deliver the specimen to the laboratory designated by your physician within ONE HOUR of collection. The clinic will notify you with your test result.     It is absolutely essential that you continue to use some method of birth control after your  vasectomy until your doctor tells you that you are sterile.       Please complete ASAP.       Sincerely,       [facility]

## 2019-12-31 PROBLEM — M79.644 PAIN OF FINGER OF RIGHT HAND: Status: RESOLVED | Noted: 2019-11-05 | Resolved: 2019-12-31

## 2019-12-31 PROBLEM — M25.641 STIFFNESS OF FINGER JOINT, RIGHT: Status: RESOLVED | Noted: 2019-11-05 | Resolved: 2019-12-31

## 2020-02-24 ENCOUNTER — HEALTH MAINTENANCE LETTER (OUTPATIENT)
Age: 45
End: 2020-02-24

## 2020-03-16 ENCOUNTER — TELEPHONE (OUTPATIENT)
Dept: FAMILY MEDICINE | Facility: CLINIC | Age: 45
End: 2020-03-16

## 2020-03-16 DIAGNOSIS — I10 BENIGN ESSENTIAL HYPERTENSION: ICD-10-CM

## 2020-03-16 RX ORDER — LOSARTAN POTASSIUM 100 MG/1
100 TABLET ORAL DAILY
Qty: 90 TABLET | Refills: 0 | Status: SHIPPED | OUTPATIENT
Start: 2020-03-16 | End: 2020-06-25

## 2020-03-16 RX ORDER — METOPROLOL SUCCINATE 50 MG/1
50 TABLET, EXTENDED RELEASE ORAL DAILY
Qty: 90 TABLET | Refills: 0 | Status: SHIPPED | OUTPATIENT
Start: 2020-03-16 | End: 2020-09-03

## 2020-03-16 NOTE — TELEPHONE ENCOUNTER
Ok refills done. Can be seen again in late spring unless a lot worse or new issues. Darrell Jones MD

## 2020-03-16 NOTE — TELEPHONE ENCOUNTER
[facility] request from patient:    From: Rik PEÑA Given    Sent: 3/15/2020  10:46 AM CDT    To: An Reception    Subject: RE: [facility]             If it would be easier to just give me a refill of my blood pressure medicine until all of this Covid-19 stuff blows over. I have been monitoring my blood pressure and it has been good. Let me know. Thanks, Rik          Patient has appointment scheduled for 3/18/2020, please review and let me know if the appointment is necessary or if refill can be approved. Jennifer Caraballo TC/Pt Rep

## 2020-03-16 NOTE — TELEPHONE ENCOUNTER
Patient informed of refills sent to pharmacy and of provider note as below    Chanelle Whaley BSN, RN, CPN

## 2020-04-23 ENCOUNTER — VIRTUAL VISIT (OUTPATIENT)
Dept: FAMILY MEDICINE | Facility: CLINIC | Age: 45
End: 2020-04-23
Payer: COMMERCIAL

## 2020-04-23 DIAGNOSIS — M54.16 LUMBAR RADICULOPATHY: Primary | ICD-10-CM

## 2020-04-23 PROCEDURE — 99213 OFFICE O/P EST LOW 20 MIN: CPT | Mod: 95 | Performed by: FAMILY MEDICINE

## 2020-04-23 RX ORDER — PREDNISONE 20 MG/1
TABLET ORAL
Qty: 10 TABLET | Refills: 1 | Status: SHIPPED | OUTPATIENT
Start: 2020-04-23 | End: 2020-09-03

## 2020-04-23 RX ORDER — METHOCARBAMOL 750 MG/1
750 TABLET, FILM COATED ORAL 3 TIMES DAILY PRN
Qty: 40 TABLET | Refills: 1 | Status: SHIPPED | OUTPATIENT
Start: 2020-04-23 | End: 2021-05-18

## 2020-04-23 NOTE — PROGRESS NOTES
"Rik Lyons is a 44 year old male who is being evaluated via a billable video visit.    Follow-up low back pain. Epidural shot L4-5 helpful in past and SIjoint left in past too. Had medrol dosepak, flexeril, robaxin and relafen in past.   Back pain never great but 3 year old jumped on back - loosening up a little now. Pain into left leg and into testicle. No dysuria or hematuria. No fevers or chills or rashes. Pain into calf. Some weakness at times.   No bm changes. Ibuprofen prn. Sleep overall ok. Work - [employer]. Medrol dosepak in past for finger. Doesn't emotionally doing ok.  The patient has been notified of following:     \"This video visit will be conducted via a call between you and your physician/provider. We have found that certain health care needs can be provided without the need for an in-person physical exam.  This service lets us provide the care you need with a video conversation.  If a prescription is necessary we can send it directly to your pharmacy.  If lab work is needed we can place an order for that and you can then stop by our lab to have the test done at a later time.    Video visits are billed at different rates depending on your insurance coverage.  Please reach out to your insurance provider with any questions.    If during the course of the call the physician/provider feels a video visit is not appropriate, you will not be charged for this service.\"    Patient has given verbal consent for Video visit? Yes    How would you like to obtain your AVS? [facility]    Patient would like the video invitation sent by: Text to cell phone: [phone]     Will anyone else be joining your video visit? No      Subjective     Rik Lyons is a 44 year old male who presents to clinic today for the following health issues:    HPI      Video Start Time: 10:23 AM    PROBLEMS TO ADD ON...    Patient Active Problem List   Diagnosis     Headache     Cervicalgia     Benign essential hypertension     Chronic " "bilateral low back pain with bilateral sciatica     Finger joint stiff, right     Past Surgical History:   Procedure Laterality Date     APPENDECTOMY         Social History     Tobacco Use     Smoking status: Former Smoker     Types: Cigarettes     Smokeless tobacco: Never Used   Substance Use Topics     Alcohol use: Yes     Comment: Occ     Family History   Problem Relation Age of Onset     Other Cancer Mother      Coronary Artery Disease Father         age 53     Diabetes Father            Reviewed and updated as needed this visit by Provider         Review of Systems   All other ROS negative.      Objective    There were no vitals taken for this visit.  Estimated body mass index is 32.49 kg/m  as calculated from the following:    Height as of 10/23/19: 1.753 m (5' 9\").    Weight as of 10/23/19: 99.8 kg (220 lb).  Physical Exam     GENERAL: healthy, alert and no distress  EYES: Eyes grossly normal to inspection, conjunctivae and sclerae normal  RESP: no audible wheeze, cough, or visible cyanosis.  No visible retractions or increased work of breathing.  Able to speak fully in complete sentences.  NEURO: Cranial nerves grossly intact, mentation intact and speech normal  PSYCH: mentation appears normal, affect normal/bright, judgement and insight intact, normal speech and appearance well-groomed      Diagnostic Test Results:  Labs reviewed in Epic             BMI:   Estimated body mass index is 32.49 kg/m  as calculated from the following:    Height as of 10/23/19: 1.753 m (5' 9\").    Weight as of 10/23/19: 99.8 kg (220 lb).       ASSESSMENT / PLAN:  (M54.16) Lumbar radiculopathy  (primary encounter diagnosis)  Comment: needs help  Plan: methocarbamol (ROBAXIN) 750 MG tablet,         predniSONE (DELTASONE) 20 MG tablet, RADIOLOGY         REFERRAL        Reveiwed risks and side effects of medication  Call/email with questions/concerns. Expected course and warning signs reviewed. To ER if a lot worse or worsening " weakness/loss of control bm/bladder. Repeat epidural per radiology if not improving overall.       Darrell Jones MD  [facility] [place]      Video-Visit Details    Type of service:  Video Visit    Video End Time:10:51 AM    Originating Location (pt. Location): Home    Distant [facility] ([facility]     Mode of Communication:  Video Conference via Doximity    No follow-ups on file.       Darrell Jones MD

## 2020-05-22 ENCOUNTER — OFFICE VISIT (OUTPATIENT)
Dept: FAMILY MEDICINE | Facility: CLINIC | Age: 45
End: 2020-05-22
Payer: COMMERCIAL

## 2020-05-22 VITALS
DIASTOLIC BLOOD PRESSURE: 98 MMHG | SYSTOLIC BLOOD PRESSURE: 142 MMHG | HEIGHT: 69 IN | RESPIRATION RATE: 18 BRPM | TEMPERATURE: 98.2 F | HEART RATE: 80 BPM | OXYGEN SATURATION: 97 % | WEIGHT: 223 LBS | BODY MASS INDEX: 33.03 KG/M2

## 2020-05-22 DIAGNOSIS — I10 BENIGN ESSENTIAL HYPERTENSION: ICD-10-CM

## 2020-05-22 DIAGNOSIS — Z82.49 FHX: CORONARY ARTERY DISEASE: ICD-10-CM

## 2020-05-22 DIAGNOSIS — R06.02 SOB (SHORTNESS OF BREATH): ICD-10-CM

## 2020-05-22 DIAGNOSIS — E78.5 HYPERLIPIDEMIA LDL GOAL <130: Primary | ICD-10-CM

## 2020-05-22 PROCEDURE — 99214 OFFICE O/P EST MOD 30 MIN: CPT | Performed by: FAMILY MEDICINE

## 2020-05-22 RX ORDER — METOPROLOL SUCCINATE 100 MG/1
100 TABLET, EXTENDED RELEASE ORAL AT BEDTIME
Qty: 90 TABLET | Refills: 1 | Status: SHIPPED | OUTPATIENT
Start: 2020-05-22 | End: 2020-09-03

## 2020-05-22 RX ORDER — ATORVASTATIN CALCIUM 20 MG/1
20 TABLET, FILM COATED ORAL DAILY
Qty: 30 TABLET | Refills: 5 | Status: SHIPPED | OUTPATIENT
Start: 2020-05-22 | End: 2020-09-03

## 2020-05-22 RX ORDER — HYDROCHLOROTHIAZIDE 12.5 MG/1
12.5 TABLET ORAL DAILY
Qty: 30 TABLET | Refills: 5 | Status: SHIPPED | OUTPATIENT
Start: 2020-05-22 | End: 2021-05-18

## 2020-05-22 ASSESSMENT — MIFFLIN-ST. JEOR: SCORE: 1891.9

## 2020-05-22 NOTE — PROGRESS NOTES
"SUBJECTIVE:  iRk Lyons, a 44 year old male scheduled an appointment to discuss the following issues:  Follow-up htn. Blood pressure higher.  Placed on prednisone and robaxin last month for lumbar radiculopathy.   Back pain overall improving/managable.   Blood pressure cuff at home. No chest pain or shortness of breath.  History high cholesterol. Dad with mi - mid 50's. Mom with lung cancer.   Brother - older - doing ok.    No added sodium but lots of fast food. Stress worse. 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   2 children 8,3yo - doing ok. Sleep falling asleep ok. No sleep aides. Some anxiety issues.   One cup coffee in AM. ALCOHOL in weekends.   Active at work. Some snoring and daytime fatigue. Wife concerned at times. Breathe-rite.       Medical, social, surgical, and family histories reviewed.    ROS:  All other ROS negative.     OBJECTIVE:  BP (!) 142/98   Pulse 80   Temp 98.2  F (36.8  C) (Oral)   Resp 18   Ht 1.753 m (5' 9\")   Wt 101.2 kg (223 lb)   SpO2 97%   BMI 32.93 kg/m    EXAM:  GENERAL APPEARANCE: healthy, alert and no distress  EYES: EOMI,  PERRL  HENT: ear canals and TM's normal and nose and mouth without ulcers or lesions  NECK: no adenopathy, no asymmetry, masses, or scars and thyroid normal to palpation  RESP: lungs clear to auscultation - no rales, rhonchi or wheezes  CV: regular rates and rhythm, normal S1 S2, no S3 or S4 and no murmur, click or rub -  ABDOMEN:  soft, nontender, no HSM or masses and bowel sounds normal  MS: extremities normal- no gross deformities noted, no evidence of inflammation in joints, FROM in all extremities.  PSYCH: mentation appears normal and affect normal/bright  PSYCH: mildly anxious    ASSESSMENT / PLAN:  (E78.5) Hyperlipidemia LDL goal <130  (primary encounter diagnosis)  Comment: needs help. Vi risk for mi with family history /htn  Plan: atorvastatin (LIPITOR) 20 MG tablet, Lipid         panel reflex to direct LDL Fasting,         Comprehensive metabolic " panel, Echocardiogram         Exercise Stress        Reveiwed risks and side effects of medication  Start in 1 week. Fasting labs in 1 month.    (I10) Benign essential hypertension  Comment: needs help  Plan: metoprolol succinate ER (TOPROL-XL) 100 MG 24         hr tablet, hydrochlorothiazide (HYDRODIURIL)         12.5 MG tablet, Comprehensive metabolic panel,         Echocardiogram Exercise Stress        Double trop from 50 to 100 and add hydrochlorothiazide prn. Limit sodium and continue exercise. Consider therapist for anxiety. Meditation/yoga/deep breathing. Would like update on blood pressure in 2weeks if not better. Take toprol in pm.     (R06.02) SOB (shortness of breath)  Comment: unclear etiology - out of shape/stress/poor sleep  Plan: Echocardiogram Exercise Stress        Check stress echo. Worsening shortness of breath or chest pain to er. Consider sleep study.     (Z82.49) FHx: coronary artery disease  Comment: dad  Plan: Echocardiogram Exercise Stress        Check stress echo and start statin.     Darrell Jones MD

## 2020-06-19 ENCOUNTER — ANCILLARY PROCEDURE (OUTPATIENT)
Dept: CARDIOLOGY | Facility: CLINIC | Age: 45
End: 2020-06-19
Attending: FAMILY MEDICINE
Payer: COMMERCIAL

## 2020-06-19 DIAGNOSIS — I10 BENIGN ESSENTIAL HYPERTENSION: ICD-10-CM

## 2020-06-19 DIAGNOSIS — E78.5 HYPERLIPIDEMIA LDL GOAL <130: ICD-10-CM

## 2020-06-19 DIAGNOSIS — Z82.49 FHX: CORONARY ARTERY DISEASE: ICD-10-CM

## 2020-06-19 DIAGNOSIS — R06.02 SOB (SHORTNESS OF BREATH): ICD-10-CM

## 2020-06-19 PROCEDURE — 93017 CV STRESS TEST TRACING ONLY: CPT | Performed by: INTERNAL MEDICINE

## 2020-06-19 PROCEDURE — 93352 ADMIN ECG CONTRAST AGENT: CPT | Performed by: INTERNAL MEDICINE

## 2020-06-19 PROCEDURE — 93321 DOPPLER ECHO F-UP/LMTD STD: CPT | Performed by: INTERNAL MEDICINE

## 2020-06-19 PROCEDURE — 93018 CV STRESS TEST I&R ONLY: CPT | Performed by: INTERNAL MEDICINE

## 2020-06-19 PROCEDURE — 93350 STRESS TTE ONLY: CPT | Performed by: INTERNAL MEDICINE

## 2020-06-19 PROCEDURE — 93016 CV STRESS TEST SUPVJ ONLY: CPT | Performed by: INTERNAL MEDICINE

## 2020-06-19 PROCEDURE — 93325 DOPPLER ECHO COLOR FLOW MAPG: CPT | Performed by: INTERNAL MEDICINE

## 2020-06-19 RX ADMIN — Medication 5 ML: at 13:45

## 2020-06-22 DIAGNOSIS — I10 BENIGN ESSENTIAL HYPERTENSION: ICD-10-CM

## 2020-06-25 RX ORDER — LOSARTAN POTASSIUM 100 MG/1
TABLET ORAL
Qty: 90 TABLET | Refills: 0 | Status: SHIPPED | OUTPATIENT
Start: 2020-06-25 | End: 2021-05-18

## 2020-06-25 NOTE — TELEPHONE ENCOUNTER
Routing refill request to provider for review/approval because:    Patient failed:  Blood pressure under 140/90 in past 12 months  BP Readings from Last 3 Encounters:   05/22/20 (!) 142/98   11/20/19 128/80   11/06/19 (!) 146/96     Diann Silver BSN, RN

## 2020-06-26 DIAGNOSIS — E78.5 HYPERLIPIDEMIA LDL GOAL <130: ICD-10-CM

## 2020-06-26 DIAGNOSIS — I10 BENIGN ESSENTIAL HYPERTENSION: ICD-10-CM

## 2020-06-26 LAB
ALBUMIN SERPL-MCNC: 3.9 G/DL (ref 3.4–5)
ALP SERPL-CCNC: 108 U/L (ref 40–150)
ALT SERPL W P-5'-P-CCNC: 62 U/L (ref 0–70)
ANION GAP SERPL CALCULATED.3IONS-SCNC: 9 MMOL/L (ref 3–14)
AST SERPL W P-5'-P-CCNC: 30 U/L (ref 0–45)
BILIRUB SERPL-MCNC: 0.5 MG/DL (ref 0.2–1.3)
BUN SERPL-MCNC: 15 MG/DL (ref 7–30)
CALCIUM SERPL-MCNC: 9.2 MG/DL (ref 8.5–10.1)
CHLORIDE SERPL-SCNC: 105 MMOL/L (ref 94–109)
CHOLEST SERPL-MCNC: 162 MG/DL
CO2 SERPL-SCNC: 25 MMOL/L (ref 20–32)
CREAT SERPL-MCNC: 0.8 MG/DL (ref 0.66–1.25)
GFR SERPL CREATININE-BSD FRML MDRD: >90 ML/MIN/{1.73_M2}
GLUCOSE SERPL-MCNC: 96 MG/DL (ref 70–99)
HDLC SERPL-MCNC: 38 MG/DL
LDLC SERPL CALC-MCNC: 92 MG/DL
NONHDLC SERPL-MCNC: 124 MG/DL
POTASSIUM SERPL-SCNC: 3.5 MMOL/L (ref 3.4–5.3)
PROT SERPL-MCNC: 8.3 G/DL (ref 6.8–8.8)
SODIUM SERPL-SCNC: 139 MMOL/L (ref 133–144)
TRIGL SERPL-MCNC: 162 MG/DL

## 2020-06-26 PROCEDURE — 36415 COLL VENOUS BLD VENIPUNCTURE: CPT | Performed by: FAMILY MEDICINE

## 2020-06-26 PROCEDURE — 80053 COMPREHEN METABOLIC PANEL: CPT | Performed by: FAMILY MEDICINE

## 2020-06-26 PROCEDURE — 80061 LIPID PANEL: CPT | Performed by: FAMILY MEDICINE

## 2020-08-03 ENCOUNTER — TELEPHONE (OUTPATIENT)
Dept: FAMILY MEDICINE | Facility: CLINIC | Age: 45
End: 2020-08-03

## 2020-08-03 DIAGNOSIS — M54.42 CHRONIC BILATERAL LOW BACK PAIN WITH BILATERAL SCIATICA: Primary | ICD-10-CM

## 2020-08-03 DIAGNOSIS — M54.41 CHRONIC BILATERAL LOW BACK PAIN WITH BILATERAL SCIATICA: Primary | ICD-10-CM

## 2020-08-03 DIAGNOSIS — G89.29 CHRONIC BILATERAL LOW BACK PAIN WITH BILATERAL SCIATICA: Primary | ICD-10-CM

## 2020-08-05 ENCOUNTER — TELEPHONE (OUTPATIENT)
Dept: FAMILY MEDICINE | Facility: CLINIC | Age: 45
End: 2020-08-05

## 2020-08-05 DIAGNOSIS — M54.41 CHRONIC BILATERAL LOW BACK PAIN WITH BILATERAL SCIATICA: Primary | ICD-10-CM

## 2020-08-05 DIAGNOSIS — G89.29 CHRONIC BILATERAL LOW BACK PAIN WITH BILATERAL SCIATICA: Primary | ICD-10-CM

## 2020-08-05 DIAGNOSIS — M54.42 CHRONIC BILATERAL LOW BACK PAIN WITH BILATERAL SCIATICA: Primary | ICD-10-CM

## 2020-08-05 DIAGNOSIS — M54.2 CERVICALGIA: ICD-10-CM

## 2020-08-05 NOTE — TELEPHONE ENCOUNTER
Reason for Call:  Other needs referral fax to  [facility]    Detailed comments: pt needs the referral faxed to the  [facility].  Pt needs to make appt asap.  He is in pain    Phone Number Patient can be reached at: Cell number on file:    Telephone Information:   Mobile [phone]       Best Time: any    Can we leave a detailed message on this number? YES    Call taken on 8/5/2020 at 10:31 AM by Leanne Eng

## 2020-08-05 NOTE — TELEPHONE ENCOUNTER
Called and spoke to patient, he said he tried to make an appointment with pain management , he said they told him there is not a referral in his chart. But, there was one placed yesterday. Not sure if there also needs to be a pain management referral placed too? I left a message for the pain management clinic to call me back.Christin Jones, I pended a referral for the pain management, can you please sign and route back to me. Thanks!Christin MARMOLEJO

## 2020-08-06 ENCOUNTER — TELEPHONE (OUTPATIENT)
Dept: FAMILY MEDICINE | Facility: CLINIC | Age: 45
End: 2020-08-06

## 2020-08-06 ENCOUNTER — TELEPHONE (OUTPATIENT)
Dept: PALLIATIVE MEDICINE | Facility: CLINIC | Age: 45
End: 2020-08-06

## 2020-08-06 DIAGNOSIS — M54.41 ACUTE BACK PAIN WITH SCIATICA, RIGHT: ICD-10-CM

## 2020-08-06 DIAGNOSIS — M54.42 ACUTE BACK PAIN WITH SCIATICA, LEFT: Primary | ICD-10-CM

## 2020-08-06 DIAGNOSIS — G89.29 OTHER CHRONIC PAIN: ICD-10-CM

## 2020-08-06 NOTE — TELEPHONE ENCOUNTER
Called and informed patient that Dr Jones placed a specific referral for pain management and to try and call and get an appointment.hCristin Quinn MA/VISHNU

## 2020-08-06 NOTE — TELEPHONE ENCOUNTER
Reason for Call:  Other call back    Detailed comments: patient is calling back, please refer to previous encounter dated: 08/05/20. Patient states called pain management since referral was sent over but pain management stated the referral is for an eval. Not injection. Patient is looking for injections and not eval. Please call to discuss.Thank you.    Phone Number Patient can be reached at: Home number on file [phone] (home)    Best Time:     Can we leave a detailed message on this number? YES    Call taken on 8/6/2020 at 9:31 AM by Hamida Lerner

## 2020-08-06 NOTE — TELEPHONE ENCOUNTER
Called [facility], they said that the order need to be for an injection, not evaluation. I pended the referral again, added the injection, under procedures. They cannot use the other radiology [facility].Christin Quinn MA/TC

## 2020-08-06 NOTE — TELEPHONE ENCOUNTER
Pt states they just want an injection and pt will be reaching out to provider for a new order.    Barb SOLARES    [facility]

## 2020-08-06 NOTE — TELEPHONE ENCOUNTER
"Screening Questions for Radiology Injections:    Injection to be done at which interventional clinic site? [facility] [place]    Instruct patient to arrive as directed prior to the scheduled appointment time:    [place] minutes before      Laura: 30 minutes before; if IV needed 1 hour before     Dr. Koenig-no IV needed for Cervical ERICKA; please instruct to arrive 30\" early    Procedure ordered by Robert    Procedure ordered? LESI    As a reminder, receiving steroids can decrease your body's ability to fight infection.   Would you still like to move forward with scheduling the injection?  Yes      Transforaminal Cervical ERICKA - no pain provider currently performing    What insurance would patient like us to bill for this procedure? BC      Worker's comp or MVA (motor vehicle accident) -Any injection DO NOT SCHEDULE and route to Poornima Eric.      [employer] insurance - For SI joint injections, DO NOT SCHEDULE and route Poornima Eric.       [employer] - Any injection besides hip/shoulder/knee joint DO NOT SCHEDULE and route to Poornima Eric. She will obtain PA and call pt back to schedule procedure or notify pt of denial.       [employer]-Route to Poornima for review      **BCBS- ALL need to be routed to Poornima for review if a PA is needed**      IF SCHEDULING IN [place] AND NEEDS A PA, IT IS OKAY TO SCHEDULE. [place] HANDLES THEIR OWN PA'S AFTER THE PATIENT IS SCHEDULED. PLEASE SCHEDULE AT LEAST 1 WEEK OUT SO A PA CAN BE OBTAINED.    Any chance of pregnancy? NO   If YES, do NOT schedule and route to RN pool    Is an  needed? No     Patient has a drive home? (mandatory) Not Applicable    Is patient taking any blood thinners (i.e. plavix, coumadin, jantoven, warfarin, heparin, pradaxa or dabigatran, etc)? No   If hold needed, do NOT schedule, route to RN pool     Is patient taking any aspirin products (includes Excedrin and Fiorinal)? No     If more than 325mg/day, OK to schedule; Instruct " pt to decrease to less than 325 mg for 7 days AND route to RN pool    For CERVICAL procedures, hold all aspirin products for 6 days.     Tell pt that if aspirin product is not held for 6 days, the procedure WILL BE cancelled.      Does the patient have a bleeding or clotting disorder? No     If YES, okay to schedule AND route to RN nurse pool    For any patients with platelet count <100, must be forwarded to provider    Is patient diabetic?  No  If YES, instruct them to bring their glucometer.    Does patient have an active infection or treated for one within the past week? No     Is patient currently taking any antibiotics?  No     For patients on chronic, preventative, or prophylactic antibiotics, procedures may be scheduled.     For patients on antibiotics for active or recent infection:antibiotic course must have been completed for 4 days    Is patient currently taking any steroid medications? (i.e. Prednisone, Medrol)  No     For patients on steroid medications, course must have been completed for 4 days    Is patient actively being treated for cancer or immunocompromised? No  If YES, do NOT schedule and route to RN pool     Are you able to get on and off an exam table with minimal or no assistance? Yes  If NO, do NOT schedule and route to RN pool    Are you able to roll over and lay on your stomach with minimal or no assistance? Yes  If NO, do NOT schedule and route to RN pool     Any allergies to contrast dye, iodine, shellfish, or numbing and steroid medications? No  If YES, route to RN pool AND add allergy information to appointment notes    Allergies: Patient has no known allergies.      Has the patient had a flu shot or any other vaccinations within 7 days before or after the procedure.  No     Does patient have an MRI/CT?  YES: MRI  Check Procedure Scheduling Grid to see if required.      Was the MRI done within the last 3 years?  Yes    If yes, where was the MRI done i.e.[facility], [facility], [place],  [facility] etc? [facility] [place]      If no, do not schedule and route to RN pool    If MRI was not done at [facility], [facility] or [facility] do NOT schedule and route to RN pool.      If pt has an imaging disc, the injection MAY be scheduled but pt has to bring disc to appt.     If they show up without the disc the injection cannot be done    Procedure Specific Instructions:      If celiac plexus block, informed patient NPO for 6 hours and that it is okay to take medications with sips of water, especially blood pressure medications  Not Applicable         If this is for a cervical procedure, informed patient that aspirin needs to be held for 6 days.   Not Applicable      If IV needed:    Do not schedule procedures requiring IV placement in the first appointment of the day or first appointment after lunch. Do NOT schedule at 0745, 0815 or 1245. ok    Instructed pt to arrive 30 minutes early for IV start if required. (Check Procedure Scheduling Grid)  YES: ok    Reminders:      If you are started on any steroids or antibiotics between now and your appointment, you must contact us because the procedure may need to be cancelled.  Yes      For all procedures except radiofrequency ablations (RFAs) and spinal cord stimulator (SCS) trials, informed patient:    IV sedation is not provided for this procedure.  If you feel that an oral anti-anxiety medication is needed, you can discuss this further with your referring provider or primary care provider.  The Pain Clinic provider will discuss specifics of what the procedure includes at your appointment.  Most procedures last 10-20 minutes.  We use numbing medications to help with any discomfort during the procedure.  Not Applicable      For patients 85 or older we recommend having an adult stay w/ them for the remainder of the day.   ok    Does the patient have any questions?  NO  Paulina Bonilla  [facility]

## 2020-08-06 NOTE — TELEPHONE ENCOUNTER
Called and spoke to patient and told him hopefully we now have the right referral and told him to try again to schedule.Christin Quinn MA/VISHNU

## 2020-08-12 ENCOUNTER — TELEPHONE (OUTPATIENT)
Dept: PALLIATIVE MEDICINE | Facility: CLINIC | Age: 45
End: 2020-08-12

## 2020-08-12 NOTE — TELEPHONE ENCOUNTER
Spoke to patient and reminded patient of date, time and location of appointment.     Requested patient to contact clinic to reschedule if had fever or coughing in the last 14 days.   Reminded patient to bring and wear mask during their visit.    Reminded patient they will need a  for this appointment and requested that the  stays out of the clinic unless its medically necessary.      Denia Rodriguez [facility]  [place]

## 2020-08-13 ENCOUNTER — RADIOLOGY INJECTION OFFICE VISIT (OUTPATIENT)
Dept: PALLIATIVE MEDICINE | Facility: CLINIC | Age: 45
End: 2020-08-13
Attending: FAMILY MEDICINE
Payer: COMMERCIAL

## 2020-08-13 ENCOUNTER — ANCILLARY PROCEDURE (OUTPATIENT)
Dept: GENERAL RADIOLOGY | Facility: CLINIC | Age: 45
End: 2020-08-13
Attending: PHYSICAL MEDICINE & REHABILITATION
Payer: COMMERCIAL

## 2020-08-13 VITALS — HEART RATE: 74 BPM | DIASTOLIC BLOOD PRESSURE: 108 MMHG | SYSTOLIC BLOOD PRESSURE: 142 MMHG | OXYGEN SATURATION: 96 %

## 2020-08-13 DIAGNOSIS — M54.16 LUMBAR RADICULOPATHY: Primary | ICD-10-CM

## 2020-08-13 DIAGNOSIS — M54.16 LUMBAR RADICULOPATHY: ICD-10-CM

## 2020-08-13 PROCEDURE — 62323 NJX INTERLAMINAR LMBR/SAC: CPT | Performed by: PHYSICAL MEDICINE & REHABILITATION

## 2020-08-13 NOTE — PATIENT INSTRUCTIONS
[facility] Procedure Discharge Instructions    Today you saw:     Dr. Sung Koenig    Your procedure:  Epidural steroid injection       Medications used:  Lidocaine (anesthetic)   Kenalog (steroid)  Omnipaque (contrast)    Normal Saline             Be cautious when walking as numbness and/or weakness in the legs may occur up to 6-8 hours after the procedure due to effect of the local anesthetic    Do not drive for 6 hours. The effect of the local anesthetic could slow your reflexes.     Avoid strenuous activity for the first 24 hours. You may resume your regular activities after that.     You may shower, however avoid swimming, tub baths or hot tubs for 24 hours following your procedure    You may have a mild to moderate increase in pain for several days following the injection.      You may use ice packs for 10-15 minutes, 3 to 4 times a day at the injection site for comfort    Do not use heat to painful areas for 6 to 8 hours. This will give the local anesthetic time to wear off and prevent you from accidentally burning your skin.    Unless you have been directed to avoid the use of anti-inflammatory medications (NSAIDS-ibuprofen, Aleve, Motrin), you may use these medications or Tylenol for pain control if needed.     With diabetes, check your blood sugar more frequently than usual as your blood sugar may be higher than normal for 10-14 days following a steroid injection. Contact your doctor who manages your diabetes if your blood sugar is higher than usual    Possible side effects of steroids that you may experience include flushing, elevated blood pressure, increased appetite, mild headaches and restlessness.  All of these symptoms will get better with time.    It may take up to 14 days for the steroid medication to start working although you may feel the effect as early as a few days after the procedure.     Follow up with your referring provider in 2-3 weeks      If you experience any  of the following, call the pain center line during work hours at [phone] or on-call physician after hours at [phone]:  -Fever over 100 degree F  -Swelling, bleeding, redness, drainage, warmth at the injection site  -Progressive weakness or numbness in your legs  -Loss of bowel or bladder function  -Unusual headache that is not relieved by Tylenol or your regular headache medication  -Unusual new onset of pain that is not improving

## 2020-08-13 NOTE — PROGRESS NOTES
[facility] - Procedure Note    Date of Visit: 8/13/2020    Procedure performed: Lumbar 4-5 interlaminar epidural steroid injection  Diagnosis: Lumbar spondylosis; Lumbar radiculitis/radiculopathy  : Sung Koenig DO & Arthur Jama MD  Anesthesia: none    Indications: Rik Lyons is a 44 year old male who is seen at the request of Dr. Jones for a lumbar epidural steroid injection. The patient describes pain in his low back that radiates into his left leg posteriorly to the foot. The patient has been exhibiting symptoms consistent with lumbar intraspinal inflammation and radiculopathy. Symptoms have been persistent, disabling, and intermittently severe. The patient reports minimal improvement with conservative treatment, including oral medications and exercises.     He has had prior lumbar epidural steroid injection with significant pain relief until the past 2-3 months on 7/13/2018 .    Lumbar MRI was done on 10/31/2017 which showed   IMPRESSION:  1. L4-5 left apophyseal joint degenerative arthrosis and periarticular  reactive marrow edema suggesting this could be a pain generator.  2. L4-5 mild to moderate bilateral foraminal stenosis.  3. L5-S1 small cephalad dissecting disc extrusion to the right of  midline without apparent direct neural impingement or stenosis. Small  0.3 cm disc fragment is also noted lateral to the right S1 nerve root  near its origin from the thecal sac. No nerve root displacement is  demonstrated.     ALDAIR ELENA MD    Allergies:    No Known Allergies     Vitals:  BP (!) 160/107   Pulse 91   SpO2 98%     Review of Systems: The patient denies recent fever, chills, illness, use of antibiotics or anticoagulants. All other 10-point review of systems negative.     Procedure: The procedure and risks were explained, and informed written consent was obtained from the patient. Risks include but are not limited to: infection, bleeding, increased pain,  and damage to soft tissue, nerve, muscle, and vasculature structures. After getting informed consent, patient was brought into the procedure suite and was placed in a prone position on the procedure table. A Pause for the Cause was performed. Patient was prepped and draped in sterile fashion.     The 4-5 interspace was identified with use of fluoroscopy in AP view. A 25-gauge, 1.5 inch needle was used to anesthetize the skin and subcutaneous tissue entry site with a total of 2 ml of 1% lidocaine. Under fluoroscopic visualization, a 22-gauge, 4.5 inch Tuohy epidural needle was slowly advanced towards the epidural space a few millimeters left-sided of midline. The latter part of the needle advancement was guided with fluoroscopy in the lateral view. The epidural space was identified using loss of resistance technique. After negative aspiration for heme and cerebrospinal fluid, a total of 1 mL of non-ionic contrast was injected to confirm needle placement with 9 mL of contrast wasted. Epidurogram confirmed spread within the posterior epidural space. 1 ml of 40mg/ml of triamcinolone, 1 ml of 1% lidocaine, and 3 ml of preservative free saline was injected. The needle was removed.  Images were saved to PACS.    The patient tolerated the procedure well, and there was no evidence of procedural complications. No new sensory or motor deficits were noted following the procedure. The patient was stable and able to ambulate on discharge home. Post-procedure instructions were provided.     Pre-procedure pain score: 3/10 in the back, 3/10 in the leg  Post-procedure pain score: 3/10 in the back, 3/10 in the leg    Assessment/Plan: Rik Lyons is a 44 year old male s/p lumbar interlaminar epidural steroid injection today for lumbar spondylosis and radiculitis/radiculopathy.     1. Following today's procedure, the patient was advised to contact the [facility] for any of the following:   Fever, chills, or night  sweats   New onset of pain, numbness, or weakness   Any questions/concerns regarding the procedure  If unable to contact the Pain Center, the patient was instructed to go to a local Emergency Room for any complications.   2. The patient will receive a follow-up call in 1 week.   3. Follow-up with the referring provider in 2 weeks for post-procedure evaluation.        Sung Koenig DO  [facility]

## 2020-08-13 NOTE — PROGRESS NOTES
Pre-procedure Intake    Have you been fasting? NA    If yes, for how long?     Are you taking a prescribed blood thinner such as coumadin, Plavix, Xarelto?    No    If yes, when did you take your last dose?     Do you take aspirin?  No    If cervical procedure, have you held aspirin for 6 days?   NA    Do you have any allergies to contrast dye, iodine, steroid and/or numbing medications?  NO    Are you currently taking antibiotics or have an active infection?  NO    Have you had a fever/elevated temperature within the past week? NO    Are you currently taking oral steroids? NO    Do you have a ? Yes       Are you pregnant or breastfeeding?  Not Applicable    Are the vital signs normal?  No: BP:160/107  Pulse:91

## 2020-08-13 NOTE — NURSING NOTE
Discharge Information    IV Discontiued Time:  NA    Amount of Fluid Infused:  NA    Discharge Criteria = When patient returns to baseline or as per MD order    Consciousness:  Pt is fully awake    Circulation:  BP +/- 20% of pre-procedure level    Respiration:  Patient is able to breathe deeply    O2 Sat:  Patient is able to maintain O2 Sat >92% on room air    Activity:  Moves 4 extremities on command    Ambulation:  Patient is able to stand and walk or stand and pivot into wheelchair    Dressing:  Clean/dry or No Dressing    Notes:   Discharge instructions and AVS given to patient    Patient meets criteria for discharge?  YES    Admitted to PCU?  No    Responsible adult present to accompany patient home?  Yes    Pt is on blood pressure medications. He did not take them this morning. Will take them when he gets home.    Signature/Title:    Terrie Munroe RN Care Coordinator  [facility] [place]

## 2020-08-13 NOTE — Clinical Note
Lumbar epidural steroid injection completed.     Thanks for the referral!    DO [facility]

## 2020-09-03 ENCOUNTER — OFFICE VISIT (OUTPATIENT)
Dept: FAMILY MEDICINE | Facility: CLINIC | Age: 45
End: 2020-09-03
Payer: COMMERCIAL

## 2020-09-03 VITALS
OXYGEN SATURATION: 97 % | WEIGHT: 220 LBS | TEMPERATURE: 97.5 F | DIASTOLIC BLOOD PRESSURE: 78 MMHG | SYSTOLIC BLOOD PRESSURE: 132 MMHG | RESPIRATION RATE: 16 BRPM | HEART RATE: 84 BPM | BODY MASS INDEX: 32.49 KG/M2

## 2020-09-03 DIAGNOSIS — R06.83 SNORING: Primary | ICD-10-CM

## 2020-09-03 DIAGNOSIS — I10 BENIGN ESSENTIAL HYPERTENSION: ICD-10-CM

## 2020-09-03 DIAGNOSIS — E78.5 HYPERLIPIDEMIA LDL GOAL <130: ICD-10-CM

## 2020-09-03 PROCEDURE — 99214 OFFICE O/P EST MOD 30 MIN: CPT | Performed by: FAMILY MEDICINE

## 2020-09-03 RX ORDER — ATORVASTATIN CALCIUM 20 MG/1
20 TABLET, FILM COATED ORAL DAILY
Qty: 90 TABLET | Refills: 3 | Status: SHIPPED | OUTPATIENT
Start: 2020-09-03 | End: 2021-05-18

## 2020-09-03 RX ORDER — METOPROLOL SUCCINATE 100 MG/1
100 TABLET, EXTENDED RELEASE ORAL AT BEDTIME
Qty: 90 TABLET | Refills: 3 | Status: SHIPPED | OUTPATIENT
Start: 2020-09-03 | End: 2021-05-18

## 2020-09-03 RX ORDER — LOSARTAN POTASSIUM AND HYDROCHLOROTHIAZIDE 12.5; 1 MG/1; MG/1
1 TABLET ORAL DAILY
Qty: 90 TABLET | Refills: 1 | Status: SHIPPED | OUTPATIENT
Start: 2020-09-03 | End: 2021-05-18

## 2020-09-03 NOTE — PROGRESS NOTES
SUBJECTIVE:  Rikmackenzie Lyons, a 44 year old male scheduled an appointment to discuss the following issues:  Follow-up htn/high cholesterol.   Built new home. Has blood pressure cuff home - will monitor. No chest pain or shortness of breath. Exercise -moving and working (hardware floor). Emotionally doing ok. No nausea, vomiting or diarrhea or black/bloody stools.  No urine changes or hematuria.     Medical, social, surgical, and family histories reviewed.    ROS:  All other ROS negative.     OBJECTIVE:  /78   Pulse 84   Temp 97.5  F (36.4  C) (Tympanic)   Resp 16   Wt 99.8 kg (220 lb)   SpO2 97%   BMI 32.49 kg/m    EXAM:  GENERAL APPEARANCE: healthy, alert and no distress  EYES: EOMI,  PERRL  NECK: no adenopathy, no asymmetry, masses, or scars and thyroid normal to palpation  RESP: lungs clear to auscultation - no rales, rhonchi or wheezes  CV: regular rates and rhythm, normal S1 S2, no S3 or S4 and no murmur, click or rub -  ABDOMEN:  soft, nontender, no HSM or masses and bowel sounds normal  MS: extremities normal- no gross deformities noted, no evidence of inflammation in joints, FROM in all extremities.  PSYCH: mentation appears normal and affect normal/bright    ASSESSMENT / PLAN:  (R06.83) Snoring  (primary encounter diagnosis)  Comment: possible sean. Patient NOT wanting to do in clinic sleep study  Plan: SLEEP EVALUATION & MANAGEMENT REFERRAL - [facility] - Numerous Locations -         [phone]        Continue call around to see if can do outpt. Weight loss.     (I10) Benign essential hypertension  Comment: stable  Plan: losartan-hydrochlorothiazide (HYZAAR) 100-12.5         MG tablet, metoprolol succinate ER (TOPROL-XL)         100 MG 24 hr tablet        Will combo cozaar and hydrochlorothiazide and continue toprol. Reveiwed risks and side effects of medication  Chest pain or shortness of breath to er. Recheck in 6 months  Sooner if worse. Self-monitor and limit  sodium. Add norvasc if worse    (E78.5) Hyperlipidemia LDL goal <130  Comment: stable  Plan: atorvastatin (LIPITOR) 20 MG tablet        Reveiwed risks and side effects of medication  Continue exercise.     Darrell Jones MD

## 2020-11-13 ENCOUNTER — VIRTUAL VISIT (OUTPATIENT)
Dept: FAMILY MEDICINE | Facility: CLINIC | Age: 45
End: 2020-11-13
Payer: COMMERCIAL

## 2020-11-13 DIAGNOSIS — I10 BENIGN ESSENTIAL HYPERTENSION: ICD-10-CM

## 2020-11-13 DIAGNOSIS — J01.00 ACUTE NON-RECURRENT MAXILLARY SINUSITIS: Primary | ICD-10-CM

## 2020-11-13 PROCEDURE — 99214 OFFICE O/P EST MOD 30 MIN: CPT | Mod: 95 | Performed by: PHYSICIAN ASSISTANT

## 2020-11-13 RX ORDER — PREDNISONE 20 MG/1
20 TABLET ORAL 2 TIMES DAILY
Qty: 10 TABLET | Refills: 0 | Status: SHIPPED | OUTPATIENT
Start: 2020-11-13 | End: 2020-11-18

## 2020-11-13 RX ORDER — AMOXICILLIN 875 MG
875 TABLET ORAL 2 TIMES DAILY
Qty: 20 TABLET | Refills: 0 | Status: SHIPPED | OUTPATIENT
Start: 2020-11-13 | End: 2020-11-23

## 2020-11-13 NOTE — PROGRESS NOTES
"Rik Lynos is a 44 year old male who is being evaluated via a billable telephone visit.      The patient has been notified of following:     \"This telephone visit will be conducted via a call between you and your physician/provider. We have found that certain health care needs can be provided without the need for a physical exam.  This service lets us provide the care you need with a short phone conversation.  If a prescription is necessary we can send it directly to your pharmacy.  If lab work is needed we can place an order for that and you can then stop by our lab to have the test done at a later time.    Telephone visits are billed at different rates depending on your insurance coverage. During this emergency period, for some insurers they may be billed the same as an in-person visit.  Please reach out to your insurance provider with any questions.    If during the course of the call the physician/provider feels a telephone visit is not appropriate, you will not be charged for this service.\"    Patient has given verbal consent for Telephone visit?  Yes    What phone number would you like to be contacted at? [phone]    How would you like to obtain your AVS? Chhaya James     Rik Lyons is a 44 year old male who presents via phone visit today for the following health issues:    HPI     Acute Illness  Acute illness concerns: poss sinus inf  Onset/Duration: 3 days  Symptoms:  Fever: no  Chills/Sweats: no  Headache (location?): YES  Sinus Pressure: YES  Conjunctivitis:  no  Ear Pain: no  Rhinorrhea: YES  Congestion: YES  Sore Throat: no  Cough: YES  Wheeze: no  Decreased Appetite: no  Nausea: no  Vomiting: no  Diarrhea: no  Dysuria/Freq.: no  Dysuria or Hematuria: no  Fatigue/Achiness: YES  Sick/Strep Exposure: no  Therapies tried and outcome: mucinex  No  Fever. Mild cough. No profound body aches. No diarrhea. No loss of taste or smell. No sob.  No obvious exposure to covid.   Blood pressure well " controlled .    Review of Systems   Constitutional, HEENT, cardiovascular, pulmonary, GI, , musculoskeletal, neuro, skin, endocrine and psych systems are negative, except as otherwise noted.       Objective          Vitals:  No vitals were obtained today due to virtual visit.    healthy, alert and no distress  PSYCH: Alert and oriented times 3; coherent speech, normal   rate and volume, able to articulate logical thoughts, able   to abstract reason, no tangential thoughts, no hallucinations   or delusions  His affect is normal  RESP: No cough, no audible wheezing, able to talk in full sentences  Remainder of exam unable to be completed due to telephone visits            Assessment/Plan:    Rik was seen today for sinus problem.    Diagnoses and all orders for this visit:    Acute non-recurrent maxillary sinusitis  -     amoxicillin (AMOXIL) 875 MG tablet; Take 1 tablet (875 mg) by mouth 2 times daily for 10 days  -     predniSONE (DELTASONE) 20 MG tablet; Take 1 tablet (20 mg) by mouth 2 times daily for 5 days    Benign essential hypertension      Advised supportive and symptomatic treatment.  Follow up with Provider - if condition persists or worsens.       Phone call duration:  6 minutes

## 2021-04-17 ENCOUNTER — HEALTH MAINTENANCE LETTER (OUTPATIENT)
Age: 46
End: 2021-04-17

## 2021-05-18 ENCOUNTER — OFFICE VISIT (OUTPATIENT)
Dept: FAMILY MEDICINE | Facility: CLINIC | Age: 46
End: 2021-05-18
Payer: COMMERCIAL

## 2021-05-18 VITALS
WEIGHT: 226 LBS | OXYGEN SATURATION: 99 % | TEMPERATURE: 97.4 F | BODY MASS INDEX: 33.37 KG/M2 | SYSTOLIC BLOOD PRESSURE: 147 MMHG | HEART RATE: 93 BPM | DIASTOLIC BLOOD PRESSURE: 96 MMHG

## 2021-05-18 DIAGNOSIS — I10 BENIGN ESSENTIAL HYPERTENSION: ICD-10-CM

## 2021-05-18 DIAGNOSIS — R06.83 SNORING: Primary | ICD-10-CM

## 2021-05-18 DIAGNOSIS — E78.5 HYPERLIPIDEMIA LDL GOAL <130: ICD-10-CM

## 2021-05-18 PROCEDURE — 99214 OFFICE O/P EST MOD 30 MIN: CPT | Performed by: FAMILY MEDICINE

## 2021-05-18 RX ORDER — LOSARTAN POTASSIUM AND HYDROCHLOROTHIAZIDE 12.5; 1 MG/1; MG/1
1 TABLET ORAL DAILY
Qty: 90 TABLET | Refills: 1 | Status: SHIPPED | OUTPATIENT
Start: 2021-05-18 | End: 2022-01-25

## 2021-05-18 RX ORDER — METOPROLOL SUCCINATE 100 MG/1
100 TABLET, EXTENDED RELEASE ORAL AT BEDTIME
Qty: 90 TABLET | Refills: 1 | Status: SHIPPED | OUTPATIENT
Start: 2021-05-18 | End: 2022-01-25

## 2021-05-18 RX ORDER — ATORVASTATIN CALCIUM 20 MG/1
20 TABLET, FILM COATED ORAL DAILY
Qty: 90 TABLET | Refills: 1 | Status: SHIPPED | OUTPATIENT
Start: 2021-05-18 | End: 2022-01-25

## 2021-05-18 NOTE — PROGRESS NOTES
SUBJECTIVE:  Rikmackenzie Lyons, a 45 year old male scheduled an appointment to discuss the following issues:  Follow-up htn/high cholesterol. run up of hyzaar 2 weeks ago.   Outside blood pressure readings ok.    Exercise - back to work - active.   Work - [employer]. No chest pain or shortness of breath. No nausea, vomiting or diarrhea. Black/bloody stools. No urine changes or hematuria. Emotionally doing ok.   Snoring - possible sean. Some daytime fatigue.   Side sleeper. Breath-rite not helpful. No regular ALCOHOL.     Medical, social, surgical, and family histories reviewed.    ROS:  All other ROS negative  OBJECTIVE:  BP (!) 147/96   Pulse 93   Temp 97.4  F (36.3  C) (Tympanic)   Wt 102.5 kg (226 lb)   SpO2 99%   BMI 33.37 kg/m     EXAM:  GENERAL APPEARANCE: healthy, alert and no distress  EYES: EOMI,  PERRL  HENT: ear canals and TM's normal and nose and mouth without ulcers or lesions  NECK: no adenopathy, no asymmetry, masses, or scars and thyroid normal to palpation  RESP: lungs clear to auscultation - no rales, rhonchi or wheezes  CV: regular rates and rhythm, normal S1 S2, no S3 or S4 and no murmur, click or rub -  ABDOMEN:  soft, nontender, no HSM or masses and bowel sounds normal  MS: extremities normal- no gross deformities noted, no evidence of inflammation in joints, FROM in all extremities.  PSYCH: mentation appears normal and affect normal/bright    ASSESSMENT / PLAN:  (R06.83) Snoring  (primary encounter diagnosis)  Comment: likely sean  Plan: SLEEP EVALUATION & MANAGEMENT REFERRAL - [facility]          [phone] (Age 15 and up)        Continue exercise and weight loss. Avoid ALCOHOL. Follow-up sleep study    (I10) Benign essential hypertension  Comment: off med  Plan: losartan-hydrochlorothiazide (HYZAAR) 100-12.5         MG tablet, metoprolol succinate ER (TOPROL-XL)         100 MG 24 hr tablet        Restart hyzaar/ treatment sean and lower sodium. Weight  los. Add norvasc if not a goal. Chest pain or shortness of breath to er. Recheck in 4 months      (E78.5) Hyperlipidemia LDL goal <130  Plan: atorvastatin (LIPITOR) 20 MG tablet        See above. Recheck in 4 months  Labs     Darrell Jones MD

## 2021-07-05 PROBLEM — I10 BENIGN ESSENTIAL HYPERTENSION: Chronic | Status: ACTIVE | Noted: 2018-05-07

## 2021-07-06 ENCOUNTER — VIRTUAL VISIT (OUTPATIENT)
Dept: SLEEP MEDICINE | Facility: CLINIC | Age: 46
End: 2021-07-06
Attending: FAMILY MEDICINE
Payer: COMMERCIAL

## 2021-07-06 VITALS — BODY MASS INDEX: 33.47 KG/M2 | HEIGHT: 69 IN | WEIGHT: 226 LBS

## 2021-07-06 DIAGNOSIS — G47.30 SLEEP APNEA, UNSPECIFIED TYPE: ICD-10-CM

## 2021-07-06 DIAGNOSIS — E66.811 CLASS 1 OBESITY DUE TO EXCESS CALORIES WITH BODY MASS INDEX (BMI) OF 33.0 TO 33.9 IN ADULT, UNSPECIFIED WHETHER SERIOUS COMORBIDITY PRESENT: ICD-10-CM

## 2021-07-06 DIAGNOSIS — R53.83 MALAISE AND FATIGUE: ICD-10-CM

## 2021-07-06 DIAGNOSIS — I10 ESSENTIAL HYPERTENSION: ICD-10-CM

## 2021-07-06 DIAGNOSIS — Z72.820 LACK OF ADEQUATE SLEEP: ICD-10-CM

## 2021-07-06 DIAGNOSIS — R53.81 MALAISE AND FATIGUE: ICD-10-CM

## 2021-07-06 DIAGNOSIS — R06.83 SNORING: ICD-10-CM

## 2021-07-06 DIAGNOSIS — E66.09 CLASS 1 OBESITY DUE TO EXCESS CALORIES WITH BODY MASS INDEX (BMI) OF 33.0 TO 33.9 IN ADULT, UNSPECIFIED WHETHER SERIOUS COMORBIDITY PRESENT: ICD-10-CM

## 2021-07-06 DIAGNOSIS — R06.89 DYSPNEA AND RESPIRATORY ABNORMALITY: Primary | ICD-10-CM

## 2021-07-06 DIAGNOSIS — R06.00 DYSPNEA AND RESPIRATORY ABNORMALITY: Primary | ICD-10-CM

## 2021-07-06 PROCEDURE — 99203 OFFICE O/P NEW LOW 30 MIN: CPT | Mod: GT | Performed by: PHYSICIAN ASSISTANT

## 2021-07-06 ASSESSMENT — MIFFLIN-ST. JEOR: SCORE: 1900.51

## 2021-07-06 NOTE — PATIENT INSTRUCTIONS
Your BMI is Body mass index is 33.37 kg/m .  Weight management is a personal decision.  If you are interested in exploring weight loss strategies, the following discussion covers the approaches that may be successful. Body mass index (BMI) is one way to tell whether you are at a healthy weight, overweight, or obese. It measures your weight in relation to your height.  A BMI of 18.5 to 24.9 is in the healthy range. A person with a BMI of 25 to 29.9 is considered overweight, and someone with a BMI of 30 or greater is considered obese. More than two-thirds of American adults are considered overweight or obese.  Being overweight or obese increases the risk for further weight gain. Excess weight may lead to heart disease and diabetes.  Creating and following plans for healthy eating and physical activity may help you improve your health.  Weight control is part of healthy lifestyle and includes exercise, emotional health, and healthy eating habits. Careful eating habits lifelong are the mainstay of weight control. Though there are significant health benefits from weight loss, long-term weight loss with diet alone may be very difficult to achieve- studies show long-term success with dietary management in less than 10% of people. Attaining a healthy weight may be especially difficult to achieve in those with severe obesity. In some cases, medications, devices and surgical management might be considered.  What can you do?  If you are overweight or obese and are interested in methods for weight loss, you should discuss this with your provider.     Consider reducing daily calorie intake by 500 calories.     Keep a food journal.     Avoiding skipping meals, consider cutting portions instead.    Diet combined with exercise helps maintain muscle while optimizing fat loss. Strength training is particularly important for building and maintaining muscle mass. Exercise helps reduce stress, increase energy, and improves fitness.  Increasing exercise without diet control, however, may not burn enough calories to loose weight.       Start walking three days a week 10-20 minutes at a time    Work towards walking thirty minutes five days a week     Eventually, increase the speed of your walking for 1-2 minutes at time    In addition, we recommend that you review healthy lifestyles and methods for weight loss available through the National Institutes of Health patient information sites:  http://win.niddk.nih.gov/publications/index.htm    And look into health and wellness programs that may be available through your health insurance provider, employer, local community center, or [employer].    Weight management plan: Patient was referred to their PCP to discuss a diet and exercise plan.     MY CONTACT NUMBERS ARE: [phone]  DOCTOR : JEMMA Munoz  SLEEP CENTER :   CPAP EQUIPMENT :    IF I HAVE SLEEP APNEA.....  WHERE CAN I FIND MORE INFORMATION?    American Academy of Sleep Medicine Patient information on sleep disorders:  http://yoursleep.aasmnet.org    THINGS TO REMEMBER  In most situations, sleep apnea is a lifelong disease that must be managed with daily therapy. Untreated disease, when severe, may result in an increased risk for an array of problems from heart disease to mood changes, car accidents and shorter lifespan.    CPAP -  WHY AND HOW?  Continuous positive airway pressure, or CPAP, is the most effective treatment for obstructive sleep apnea. A decision to use CPAP is a major step forward in the pursuit of a healthier life. The successful use of CPAP will help you breathe easier, sleep better and live healthier. Using CPAP can be a positive experience if you keep these turk points in mind:  1. Commitment  CPAP is not a quick fix for your problem. It involves a long-term commitment to improve your sleep and your health.    2. Communication  Stay in close communication with both your sleep doctor and your CPAP supplier. Ask lots  "of questions and seek help when you need it.    3. Consistency  Use CPAP all night, every night and for every nap. You will receive the maximum health benefits from CPAP when you use it every time that you sleep. This will also make it easier for your body to adjust to the treatment.    4. Correction  The first machine and mask that you try may not be the best ones for you. Work with your sleep doctor and your CPAP supplier to make corrections to your equipment selection. Ask about trying a different type of machine or mask if you have ongoing problems. Make sure that your mask is a good fit and learn to use your equipment properly.    5. Challenge  Tell a family member or close friend to ask you each morning if you used your CPAP the previous night. Have someone to challenge you to give it your best effort.    6. Connection   Your adjustment to CPAP will be easier if you are able to connect with others who use the same treatment. Ask your sleep doctor if there is a support group in your area for people who have sleep apnea, or look for one on the Internet.    7. Comfort   Increase your level of comfort by using a saline spray, decongestant or heated humidifier if CPAP irritates your nose, mouth or throat. Use your unit's \"ramp\" setting to slowly get used to the air pressure level. There may be soft pads you can buy that will fit over your mask straps. Look on www.CPAP.com for accessories such as these straps, a pillow contoured for side-sleeping with CPAP, longer hoses, hose covers to reduce condensation, or stands to keep the hose out of your way.                                 8. Cleaning   Clean your mask, tubing and headgear on a regular basis. Put this time in your schedule so that you don't forget to do it. Check and replace the filters for your CPAP unit and humidifier.    9. Completion   Although you are never finished with CPAP therapy, you should reward yourself by celebrating the completion of your " first month of treatment. Expect this first month to be your hardest period of adjustment. It will involve some trial and error as you find the machine, mask and pressure settings that are right for you.    Continuation  After your first month of treatment, continue to make a daily commitment to use your CPAP all night, every night and for every nap.    CPAP-Tips to starting with success:  Begin using your CPAP for short periods of time during the day while you watch TV or read.    Use CPAP every night and for every nap. Using it less often reduces the health benefits and makes it harder for your body to get used to it.    Newer CPAP models are virtually silent; however, if you find the sound of your CPAP machine to be bothersome, place the unit under your bed to dampen the sound.     Make small adjustments to your mask, tubing, straps and headgear until you get the right fit. Tightening the mask may actually worsen the leak.  If it leaves significant marks on your face or irritates the bridge of your nose, it may not be the best mask for you.  Speak with the person who supplied the mask and consider trying other masks.    Use a saline nasal spray to ease mild nasal congestion. Neti-Pot or saline nasal rinses may also help. Nasal gel sprays can help reduce nasal dryness.  Biotene mouthwash can be helpful to protect your teeth if you experience frequent dry mouth.  Dry mouth may be a sign of air escaping out of your mouth or out of the mask in the case of a full face mask.  Speak with your provider if you expect that is the case.     Take a nasal decongestant to relieve more severe nasal or sinus congestion.  Do not use Afrin (oxymetazoline) nasal spray more than 3 days in a row.  Speak with your sleep doctor if your nasal congestion is chronic.    Use a heated humidifier that fits your CPAP model to enhance your breathing comfort. Adjust the heat setting up if you get a dry nose or throat, down if you get  condensation in the hose or mask.  Position the CPAP lower than you so that any condensation in the hose drains back into the machine rather than towards the mask.    Try a system that uses nasal pillows if traditional masks give you problems.    Clean your mask, tubing and headgear once a week. Make sure the equipment dries fully.    Regularly check and replace the filters for your CPAP unit and humidifier.    Work closely with your sleep doctor and your CPAP supplier to make sure that you have the machine, mask and air pressure setting that works best for you.    BESIDES CPAP, WHAT OTHER THERAPIES ARE THERE?    Postioning devices if you only have the problem on your back    Dental devices if your condition is mild    Nasal valves may be effective though experience is limited    Tongue Retaining Device if missing teeth precludes the use of a dental device    Weight loss if you are overweight    Surgery in limited cases where devices are not acceptable or there are problems with structures in the nose and throat  If treated with one of these alternative options, further evaluation is necessary to ensure that the therapy is effective. This may require some form of testing.     Healthy Lifestyle:  Healthy diet, exercise and Limit alcohol: Not only will excessive alcohol increase your weight over time, but it irritates the throat tissues and make them swell, shrinking the airway and causing snoring. Drinking alcohol should be limited and stopped within 3-4 hours before going to bed.   Stop smoking: (Red swollen throat, heat, nicotine), also irritates and swells the airway, among numerous other negative health consequences.    Positioning Device  This example shows a pillow that straps around the waist. It may be appropriate for those whose sleep study shows milder sleep apnea that occurs primarily when lying flat on one's back. Preliminary studies have shown benefit but effectiveness at home should be  verified.    Nasal Valves              Nasal valves may not be effective if you have frequent nasal congestion or have difficulty breathing through your nose. They may be an option for mild apnea if other options are not well tolerated. The efficacy of these devices is generally less than CPAP or oral appliances.  Oral Appliance  These are examples of two of many custom-made devices that are more likely to work in mild sleep apnea  Oral appliances are dental mouth pieces that fit very much like a sports mouth guards or removable orthodontic retainers. They are used to treat snoring  And obstructive sleep apnea . The device prevents the airway from collapsing by either holding the tongue or supporting the jaw in a forward position. Since oral appliances are non-invasive and easy to use, they may be considered as an early treatment option. Oral appliance therapy (OAT) involves the customization, selection, fabrication, fitting, adjustments and long-term follow-up care of specially designed oral devices, worn during sleep, which reposition the lower jaw and tongue base forward to maintain an open airway.  Custom made oral appliances are proven to be more effective than over-the-counter devices. Therefore, the over-the-counter devices are recommended not to be used as a screening tool nor as a therapeutic option.  Who gets a dental device?  Oral appliance therapy can be used as an alternative to  CPAP therapy for the treatment of mild to moderate sleep apnea and for those patients who prefer OAT to CPAP. Oral appliance therapy is a first line therapy for the treatment of primary snoring. Additionally, OAT is an option for those that cannot tolerate CPAP as therapy or who have experienced insufficient surgical results.    Possible side effects?  Frequent but minor side effects include: excessive salivation, dry mouth, discomfort of teeth and jaw and temporary changes in the patient s bite.  Potential complications  include: jaw pain, permanent occlusal changes and TMJ symptoms.  The above mentioned side effects and complications can be recognized and managed by dentists trained in dental sleep medicine.    Finding a dentist that practices dental sleep medicine  Specific training is available through the American Academy of Dental Sleep Medicine for dentists interested in working in the field of sleep. To find a dentist who is educated in the field of sleep and the use of oral appliances, near you, visit the Web site of the American Academy of Dental Sleep Medicine; also see http://www.accpstorage.org/newOrganization/patients/oralAppliances.pdf   To search for a dentist certified in these practices:  Http://aadsm.org/FindADentist.aspx?1  [email or web address]/[email or web address]/[email or web address]    Tongue Retaining Device               Tongue Retaining Devices are devices that generally  suction cup  onto the tongue preventing it from falling back into the back of the throat during sleep.  They may be an option for people missing teeth, but can be uncomfortable. This particular device can be purchased online, but similar devices made by dentists fit more precisely and may be tolerated better. In general, they are rarely effective and often not very well tolerated.    Weight Loss:  Some patients may experience reduction or elimination of sleep apnea with weight loss.  Though there are significant health benefits from weight loss, long-term weight loss is very difficult to achieve- studies show success with dietary management in less that 10% of people.     If you are interested in dietary weight loss, you should review the options discussed at the National Institutes of Health patient information site:     Http:/www.health.nih.gov/topic/WeightLossDieting    Bariatric programs offer counseling in all methods of weight  "loss:    Http:/[email or web address]    Surgery:  There are a number of surgeries that have been attempted to treat apnea. In general, surgical options are usually reserved for cases in which there is a physical abnormality contributing to obstruction or other treatment options are ineffective or not tolerated. Most surgical options are either unreliable or quite invasive. One of the more common procedures is:  Uvulopalatopharyngoplasty: In this procedure, the uvula (the finger-like tissue that hangs in the back of the throat), part of the soft palate (the tissue that the uvula is attached to), and sometimes the tonsils or adenoids are removed. The efficacy of this surgery is around 30-50% .  After surgery, complications may include:  Sleepiness and sleep apnea related to post-surgery medication   Swelling, infection and bleeding   A sore throat and/or difficulty swallowing   Drainage of secretions into the nose and a nasal quality to the voice. English language speech does not seem to be affected by this surgery.   Narrowing of the airway in the nose and throat (hence constricting breathing) snoring and even iatrogenically caused sleep apnea. By cutting the tissues, excess scar tissue can \"tighten\" the airway and make it even smaller than it was before UPPP.  Patients who have had the uvula removed will become unable to correctly speak [place] or any other language that has a uvular 'r' phoneme.    Surgeries to help resolve nasal congestion may help reduce the severity of apnea slightly. Nasal congestion does not cause apnea on its own, so these surgeries are usually not performed just for CRISTINA.  They may be worth considering if the nasal congestion is significantly bothersome independent of apnea.        "

## 2021-07-06 NOTE — PROGRESS NOTES
Rik Lyons is a 45 year old who is being evaluated via a billable video visit.      How would you like to obtain your AVS? MyChart  If the video visit is dropped, the invitation should be resent by: Text to cell phone: [phone]  Will anyone else be joining your video visit? No    Does patient have any form of state insurance? N   Do you have wifi? Y  Do you have a smart phone? Y  Can you download an hunter on your phone comfortably with out assistance? Y  Can you watch a Youtube video? Y    Cherise Gbquintin    Video Start Time: 3:30 PM    Video-Visit Details    Type of service:  Video Visit    Video End Time:3:52 PM    Originating Location (pt. Location): Home    Distant Location (provider location):  [email or web address]@     Platform used for Video Visit: Tamela       Sleep Consultation:    Date on this visit: 7/6/2021    Rik Lyons is sent by Darrell Jones for a sleep consultation regarding snoring and excessive daytime sleepiness.    Primary Physician: Darrell Jones     CC: I wake myself a lot and history of HTN.    Rik Lyons is a 45 year old male with medical history remarkable for HTN and obesity.     Rik goes to sleep at 10:00 PM during the week. He wakes up at 5:20 AM with an alarm. He falls asleep in 5 minutes.  Rik denies difficulty falling asleep.  He wakes up 2-3 times a night for 5 minutes before falling back to sleep.  Rik wakes up to go to the bathroom, external stimuli and snoring.  On weekends, Rik goes to sleep at 10:00 PM.  He wakes up at 6:30 AM without an alarm. He falls asleep in 5 minutes.  Patient gets  7-8 hours of sleep per night. He does not feel rested.     Patient does watch TV in bed.     Rik does not do shift work.      Rik does snore every night and snoring is loud. Patient does have a regular bed partner. There is report of snoring and gasping.  He does have witnessed apneas. They never sleep separately.  Patient sleeps on his side. He denies occasional morning  headaches and no significant restless legs. Rik denies any sleep walking, sleep talking, dream enactment, sleep paralysis, cataplexy and hypnogogic/hypnopompic hallucinations. He has bruxism.     Rik denies difficulty breathing through his nose.      Rik has gained 0-5 pounds in the last year.  Patient describes themself as a morning person.  Patient's [place] Sleepiness score 9/24 inconsistent with excessive  daytime sleepiness.  He reports significant daytime sleepiness.    Rik naps 0 times per week. He takes some inadvertant naps.  He denies falling asleep while driving.  Patient was counseled on the importance of driving while alert, to pull over if drowsy, or nap before getting into the vehicle if sleepy.  He uses 1 cups/day of coffee. Last caffeine intake is usually before noon.    Allergies:    No Known Allergies    Medications:    Current Outpatient Medications   Medication Sig Dispense Refill     atorvastatin (LIPITOR) 20 MG tablet Take 1 tablet (20 mg) by mouth daily For cholesterol 90 tablet 1     fish oil-omega-3 fatty acids 1000 MG capsule Take 2 g by mouth daily       Loratadine (CLARITIN PO)        losartan-hydrochlorothiazide (HYZAAR) 100-12.5 MG tablet Take 1 tablet by mouth daily In AM for blood pressure. This is a combination pill of hydrochlorothiazide and cozaar 90 tablet 1     metoprolol succinate ER (TOPROL-XL) 100 MG 24 hr tablet Take 1 tablet (100 mg) by mouth At Bedtime For blood pressure and slows pulse 90 tablet 1       Problem List:  Patient Active Problem List    Diagnosis Date Noted     Finger joint stiff, right 11/05/2019     Priority: Medium     Benign essential hypertension 05/07/2018     Priority: Medium     Chronic bilateral low back pain with bilateral sciatica 05/07/2018     Priority: Medium     Headache 06/01/2015     Priority: Medium     Problem list name updated by automated process. Provider to review       Cervicalgia 06/01/2015     Priority: Medium        Past  Medical/Surgical History:  Past Medical History:   Diagnosis Date     Hypertension      NO ACTIVE PROBLEMS      Past Surgical History:   Procedure Laterality Date     APPENDECTOMY         Social History:  Social History     Socioeconomic History     Marital status:      Spouse name: Not on file     Number of children: Not on file     Years of education: Not on file     Highest education level: Not on file   Occupational History     Not on file   Social Needs     Financial resource strain: Not on file     Food insecurity     Worry: Not on file     Inability: Not on file     Transportation needs     Medical: Not on file     Non-medical: Not on file   Tobacco Use     Smoking status: Former Smoker     Types: Cigarettes     Smokeless tobacco: Never Used   Substance and Sexual Activity     Alcohol use: Yes     Comment: Occ     Drug use: No     Sexual activity: Yes     Partners: Female     Birth control/protection: None   Lifestyle     Physical activity     Days per week: Not on file     Minutes per session: Not on file     Stress: Not on file   Relationships     Social connections     Talks on phone: Not on file     Gets together: Not on file     Attends Scientologist service: Not on file     Active member of club or organization: Not on file     Attends meetings of clubs or organizations: Not on file     Relationship status: Not on file     Intimate partner violence     Fear of current or ex partner: Not on file     Emotionally abused: Not on file     Physically abused: Not on file     Forced sexual activity: Not on file   Other Topics Concern     Parent/sibling w/ CABG, MI or angioplasty before 65F 55M? No   Social History Narrative     Not on file       Family History:  Family History   Problem Relation Age of Onset     Other Cancer Mother      Coronary Artery Disease Father         age 53     Diabetes Father        Review of Systems:  A complete review of systems reviewed by me is negative with the exeption of what  "has been mentioned in the history of present illness.  CONSTITUTIONAL: NEGATIVE for weight gain/loss, fever, chills, sweats or night sweats, drug allergies.  EYES: NEGATIVE for changes in vision, blind spots, double vision.  ENT: NEGATIVE for ear pain, sore throat, sinus pain, post-nasal drip, runny nose, bloody nose  CARDIAC: NEGATIVE for fast heartbeats or fluttering in chest, chest pain or pressure, breathlessness when lying flat, swollen legs or swollen feet.  NEUROLOGIC: NEGATIVE headaches, weakness or numbness in the arms or legs.  DERMATOLOGIC: NEGATIVE for rashes, new moles or change in mole(s)  PULMONARY: NEGATIVE SOB at rest, SOB with activity, dry cough, productive cough, coughing up blood, wheezing or whistling when breathing.    GASTROINTESTINAL: NEGATIVE for nausea or vomitting, loose or watery stools, fat or grease in stools, constipation, abdominal pain, bowel movements black in color or blood noted.  GENITOURINARY: NEGATIVE for pain during urination, blood in urine, urinating more frequently than usual, irregular menstrual periods.  MUSCULOSKELETAL: NEGATIVE for muscle pain, bone or joint pain, swollen joints.  ENDOCRINE: NEGATIVE for increased thirst or urination, diabetes.  LYMPHATIC: NEGATIVE for swollen lymph nodes, lumps or bumps in the breasts or nipple discharge.    Physical Examination:  Vitals: Ht 1.753 m (5' 9\")   Wt 102.5 kg (226 lb)   BMI 33.37 kg/m    BMI= Body mass index is 33.37 kg/m .         [place] Total Score 7/6/2021   Total score - [place] 9       BJORN Total Score: 7 (07/06/21 1100)    GENERAL APPEARANCE: alert and no distress  EYES: Eyes grossly normal to inspection  NEURO: mentation intact and speech normal  PSYCH: affect normal/bright    Last Comprehensive Metabolic Panel:  Sodium   Date Value Ref Range Status   06/26/2020 139 133 - 144 mmol/L Final     Potassium   Date Value Ref Range Status   06/26/2020 3.5 3.4 - 5.3 mmol/L Final     Chloride   Date Value Ref Range Status "   06/26/2020 105 94 - 109 mmol/L Final     Carbon Dioxide   Date Value Ref Range Status   06/26/2020 25 20 - 32 mmol/L Final     Anion Gap   Date Value Ref Range Status   06/26/2020 9 3 - 14 mmol/L Final     Glucose   Date Value Ref Range Status   06/26/2020 96 70 - 99 mg/dL Final     Comment:     Fasting specimen     Urea Nitrogen   Date Value Ref Range Status   06/26/2020 15 7 - 30 mg/dL Final     Creatinine   Date Value Ref Range Status   06/26/2020 0.80 0.66 - 1.25 mg/dL Final     GFR Estimate   Date Value Ref Range Status   06/26/2020 >90 >60 mL/min/[1.73_m2] Final     Comment:     Non  GFR Calc  Starting 12/18/2018, serum creatinine based estimated GFR (eGFR) will be   calculated using the Chronic Kidney Disease Epidemiology Collaboration   (CKD-EPI) equation.       Calcium   Date Value Ref Range Status   06/26/2020 9.2 8.5 - 10.1 mg/dL Final     TSH   Date Value Ref Range Status   09/23/2015 0.65 0.40 - 4.00 mU/L Final     Impression:  Patient has features and risk factors for possible obstructive sleep apnea including: loud snoring, witnessed apnea, non-refreshing sleep, bruxism, daytime fatigue/sleepiness (ESS 9), difficulty maintaining sleep and co-morbid HTN. The STOP-BANG score is 5/8. The pathophysiology, diagnosis and treatment of CRISTINA was discussed.   Plan:    1. Schedule a Home Sleep Apnea Testing to evaluate for obstructive sleep apnea.    2. Advised him against drowsy driving.    3. Recommend weight management.     Literature provided regarding sleep apnea.      He will follow up with me in approximately two weeks after his sleep study has been completed to review the results and discuss plan of care.       Home Sleep Apnea Testing reviewed.  Obstructive sleep apnea reviewed.  Complications of untreated sleep apnea were reviewed.    Dot Mead PA-C    CC: Darrell Jones

## 2021-08-11 ENCOUNTER — TELEPHONE (OUTPATIENT)
Dept: SLEEP MEDICINE | Facility: CLINIC | Age: 46
End: 2021-08-11

## 2021-08-11 ENCOUNTER — OFFICE VISIT (OUTPATIENT)
Dept: SLEEP MEDICINE | Facility: CLINIC | Age: 46
End: 2021-08-11
Attending: PHYSICIAN ASSISTANT
Payer: COMMERCIAL

## 2021-08-11 DIAGNOSIS — G47.30 SLEEP APNEA, UNSPECIFIED TYPE: ICD-10-CM

## 2021-08-11 DIAGNOSIS — R06.89 DYSPNEA AND RESPIRATORY ABNORMALITY: ICD-10-CM

## 2021-08-11 DIAGNOSIS — Z72.820 LACK OF ADEQUATE SLEEP: ICD-10-CM

## 2021-08-11 DIAGNOSIS — R06.83 SNORING: ICD-10-CM

## 2021-08-11 DIAGNOSIS — R53.81 MALAISE AND FATIGUE: ICD-10-CM

## 2021-08-11 DIAGNOSIS — R53.83 MALAISE AND FATIGUE: ICD-10-CM

## 2021-08-11 DIAGNOSIS — R06.00 DYSPNEA AND RESPIRATORY ABNORMALITY: ICD-10-CM

## 2021-08-11 DIAGNOSIS — I10 ESSENTIAL HYPERTENSION: ICD-10-CM

## 2021-08-11 DIAGNOSIS — E66.09 CLASS 1 OBESITY DUE TO EXCESS CALORIES WITH BODY MASS INDEX (BMI) OF 33.0 TO 33.9 IN ADULT, UNSPECIFIED WHETHER SERIOUS COMORBIDITY PRESENT: ICD-10-CM

## 2021-08-11 DIAGNOSIS — E66.811 CLASS 1 OBESITY DUE TO EXCESS CALORIES WITH BODY MASS INDEX (BMI) OF 33.0 TO 33.9 IN ADULT, UNSPECIFIED WHETHER SERIOUS COMORBIDITY PRESENT: ICD-10-CM

## 2021-08-11 PROCEDURE — G0399 HOME SLEEP TEST/TYPE 3 PORTA: HCPCS | Performed by: INTERNAL MEDICINE

## 2021-08-12 ENCOUNTER — APPOINTMENT (OUTPATIENT)
Dept: SLEEP MEDICINE | Facility: CLINIC | Age: 46
End: 2021-08-12

## 2021-08-12 PROBLEM — M25.641: Status: ACTIVE | Noted: 2019-11-05

## 2021-08-12 PROBLEM — E66.09 CLASS 1 OBESITY DUE TO EXCESS CALORIES WITHOUT SERIOUS COMORBIDITY WITH BODY MASS INDEX (BMI) OF 33.0 TO 33.9 IN ADULT: Chronic | Status: ACTIVE | Noted: 2021-08-12

## 2021-08-12 PROBLEM — E66.811 CLASS 1 OBESITY DUE TO EXCESS CALORIES WITHOUT SERIOUS COMORBIDITY WITH BODY MASS INDEX (BMI) OF 33.0 TO 33.9 IN ADULT: Chronic | Status: ACTIVE | Noted: 2021-08-12

## 2021-08-12 NOTE — PROCEDURES
"HOME SLEEP STUDY INTERPRETATION    Patient: Rik Lyons  MRN: [id number]  YOB: 1975  Study Date: 8/11/2021  Referring Provider: Darrell Jones  Ordering Provider: JEMMA Hsu     Indications for Home Study: Rik Lyons is a 45 year old male with symptoms suggestive of obstructive sleep apnea.    Estimated body mass index is 33.37 kg/m  as calculated from the following:    Height as of 7/6/21: 1.753 m (5' 9\").    Weight as of 7/6/21: 102.5 kg (226 lb).  Total score - [place]: 9 (7/6/2021 11:00 AM)    Data: A full night home sleep study was performed recording the standard physiologic parameters including body position, movement, sound, nasal pressure, thermal oral airflow, chest and abdominal movements with respiratory inductance plethysmography, and oxygen saturation by pulse oximetry. Pulse rate was estimated by oximetry recording. This study was considered adequate based on > 4 hours of quality oximetry and respiratory recording. As specified by the AASM Manual for the Scoring of Sleep and Associated events, version 2.3, Rule VIII.D 1B, 4% oxygen desaturation scoring for hypopneas is used as a standard of care on all home sleep apnea testing.    Analysis Time:  501 minutes    Respiration:   Sleep Associated Hypoxemia: sustained hypoxemia was not present. Baseline oxygen saturation was 91%.  Time with saturation less than or equal to 88% was 1 minutes. The lowest oxygen saturation was 85%.   Snoring: Snoring was present.  Respiratory events: The home study revealed a presence of 5 obstructive apneas and 4 mixed and central apneas. There were 37 hypopneas resulting in a combined apnea/hypopnea index [AHI] of 5.5 events per hour.  AHI was 10.7 per hour supine, 0 per hour prone, 2.6 per hour on left side, and 0.3 per hour on right side.   Pattern: Excluding events noted above, respiratory rate and pattern was Normal.    Position: Percent of time spent: supine - 49%, prone - 0%, on left - 4%, " on right - 46%.    Heart Rate: By pulse oximetry normal rate was noted.     Assessment:   Mild obstructive sleep apnea, almost exclusively supine positional.  Sleep associated hypoxemia was not present.    Recommendations:  Consider auto-CPAP at 5-15 cmH2O, oral appliance therapy or positional therapy.  Suggest optimizing sleep hygiene and avoiding sleep deprivation.  Weight management.    Diagnosis Code(s): Obstructive Sleep Apnea G47.33    Hank Dowling MD, August 12, 2021   Diplomate, American Board of Internal Medicine, Sleep Medicine

## 2021-08-12 NOTE — PROGRESS NOTES
This HSAT was performed using a Noxturnal T3 device which recorded snore, sound, movement activity, body position, nasal pressure, oronasal thermal airflow, pulse, oximetry and both chest and abdominal respiratory effort. HSAT data was restricted to the time patient states they were in bed.     HSAT was scored using 1B 4% hypopnea rule.     HST AHI (Non-PAT): 5.5  Snoring was reported as loud.  Time with SpO2 below 89% was 2 minutes.   Overall signal quality was good     Pt will follow up with sleep provider to determine appropriate therapy.

## 2021-09-09 VITALS — WEIGHT: 220 LBS | BODY MASS INDEX: 32.58 KG/M2 | HEIGHT: 69 IN

## 2021-09-09 ASSESSMENT — MIFFLIN-ST. JEOR: SCORE: 1873.29

## 2021-09-09 NOTE — PROGRESS NOTES
"Rik Lyons is a 45 year old who is being evaluated via a billable video visit.      How would you like to obtain your AVS? MyChart  If the video visit is dropped, the invitation should be resent by: Text to cell phone: [phone]  Will anyone else be joining your video visit? No    Does patient have any form of state insurance? No    Do you have wifi? Yes  Do you have a smart phone? Yes  Can you download an hunter on your phone comfortably with out assistance? Yes  Can you watch a Youtube video? Yes        Lilibeth Pro [facility]    Video Start Time: 10:34 AM    Video-Visit Details    Type of service:  Video Visit    Video End Time:10:46 AM    Originating Location (pt. Location): Home    [facility]    Platform used for Video Visit: [employer]    Home Sleep Apnea Testing Results Visit:    Chief Complaint   Patient presents with     Sleep Study       Rik Lyons is a 45 year old male who returns to [facility] after having had Home Sleep Apnea Testing.  He presented with loud snoring, witnessed apnea, non-refreshing sleep, bruxism, daytime fatigue/sleepiness (ESS 9), difficulty maintaining sleep and co-morbid HTN. The STOP-BANG score is 5/8.     Home Sleep Apnea Testing - 8/11/21: 226 lbs 0 oz: AHI 5.5/hr. Supine AHI 10.7/hr.   Oxygen [place] of 85%.  Baseline 91%.  Sp02 =< 88% for 1 minutes  He slept on his back (49%), prone (0%), left (4%) and right (46%) sides.   Analysis time: 501 minutes.     Signal quality of Oxymeter 99% Good  Nasal Cannula 100% Good  RIP belts 100% Good.     Rik Lyons reports that he slept Poor.     Home Sleep Apnea Testing was reviewed in detail today with Rik and a copy given to him for his records.    Past medical/surgical history, family history, social history, medications and allergies were reviewed.      Ht 1.753 m (5' 9\")   Wt 99.8 kg (220 lb)   BMI 32.49 kg/m      Impression/Plan:  Mild " Obstructive Sleep Apnea.   Sleep associated hypoxemia was not present.     Treatment options discussed today including  auto-CPAP at 6-15 cmH2O, oral appliance therapy, positional therapy, polysomnography with full night PAP titration or surgical options.    Elected treatment with oral appliance therapy. Sleep dental referral placed.     Dot Mead PA-C

## 2021-09-09 NOTE — PATIENT INSTRUCTIONS
Your BMI is Body mass index is 32.49 kg/m .  Weight management is a personal decision.  If you are interested in exploring weight loss strategies, the following discussion covers the approaches that may be successful. Body mass index (BMI) is one way to tell whether you are at a healthy weight, overweight, or obese. It measures your weight in relation to your height.  A BMI of 18.5 to 24.9 is in the healthy range. A person with a BMI of 25 to 29.9 is considered overweight, and someone with a BMI of 30 or greater is considered obese. More than two-thirds of American adults are considered overweight or obese.  Being overweight or obese increases the risk for further weight gain. Excess weight may lead to heart disease and diabetes.  Creating and following plans for healthy eating and physical activity may help you improve your health.  Weight control is part of healthy lifestyle and includes exercise, emotional health, and healthy eating habits. Careful eating habits lifelong are the mainstay of weight control. Though there are significant health benefits from weight loss, long-term weight loss with diet alone may be very difficult to achieve- studies show long-term success with dietary management in less than 10% of people. Attaining a healthy weight may be especially difficult to achieve in those with severe obesity. In some cases, medications, devices and surgical management might be considered.  What can you do?  If you are overweight or obese and are interested in methods for weight loss, you should discuss this with your provider.     Consider reducing daily calorie intake by 500 calories.     Keep a food journal.     Avoiding skipping meals, consider cutting portions instead.    Diet combined with exercise helps maintain muscle while optimizing fat loss. Strength training is particularly important for building and maintaining muscle mass. Exercise helps reduce stress, increase energy, and improves fitness.  Increasing exercise without diet control, however, may not burn enough calories to loose weight.       Start walking three days a week 10-20 minutes at a time    Work towards walking thirty minutes five days a week     Eventually, increase the speed of your walking for 1-2 minutes at time    In addition, we recommend that you review healthy lifestyles and methods for weight loss available through the National Institutes of Health patient information sites:  http://win.niddk.nih.gov/publications/index.htm    And look into health and wellness programs that may be available through your health insurance provider, employer, local community center, or [employer].    Weight management plan: Patient was referred to their PCP to discuss a diet and exercise plan.

## 2021-09-10 ENCOUNTER — VIRTUAL VISIT (OUTPATIENT)
Dept: SLEEP MEDICINE | Facility: CLINIC | Age: 46
End: 2021-09-10
Payer: COMMERCIAL

## 2021-09-10 DIAGNOSIS — E66.811 CLASS 1 OBESITY DUE TO EXCESS CALORIES WITHOUT SERIOUS COMORBIDITY WITH BODY MASS INDEX (BMI) OF 33.0 TO 33.9 IN ADULT: ICD-10-CM

## 2021-09-10 DIAGNOSIS — G47.33 OSA (OBSTRUCTIVE SLEEP APNEA): Primary | ICD-10-CM

## 2021-09-10 DIAGNOSIS — E66.09 CLASS 1 OBESITY DUE TO EXCESS CALORIES WITHOUT SERIOUS COMORBIDITY WITH BODY MASS INDEX (BMI) OF 33.0 TO 33.9 IN ADULT: ICD-10-CM

## 2021-09-10 PROCEDURE — 99213 OFFICE O/P EST LOW 20 MIN: CPT | Mod: GT | Performed by: PHYSICIAN ASSISTANT

## 2021-09-26 ENCOUNTER — HEALTH MAINTENANCE LETTER (OUTPATIENT)
Age: 46
End: 2021-09-26

## 2021-11-26 ENCOUNTER — OFFICE VISIT (OUTPATIENT)
Dept: URGENT CARE | Facility: URGENT CARE | Age: 46
End: 2021-11-26
Payer: COMMERCIAL

## 2021-11-26 VITALS
BODY MASS INDEX: 33.52 KG/M2 | OXYGEN SATURATION: 97 % | TEMPERATURE: 97.5 F | WEIGHT: 227 LBS | DIASTOLIC BLOOD PRESSURE: 87 MMHG | HEART RATE: 89 BPM | SYSTOLIC BLOOD PRESSURE: 159 MMHG

## 2021-11-26 DIAGNOSIS — H57.12 LEFT EYE PAIN: Primary | ICD-10-CM

## 2021-11-26 PROCEDURE — 99213 OFFICE O/P EST LOW 20 MIN: CPT | Performed by: PHYSICIAN ASSISTANT

## 2021-11-26 ASSESSMENT — ENCOUNTER SYMPTOMS: EYE PAIN: 1

## 2021-11-26 NOTE — PROGRESS NOTES
SUBJECTIVE:   Rik Lyons is a 46 year old male presenting with a chief complaint of   Chief Complaint   Patient presents with     Eye Problem     Had surgery on eyes last thursday, wednesday took bandages off on wednesday was fine, yesterday evening felt that something in his eye.        He is an established patient of [facility].  Patient presents with sensation of FB since last night.  Patient has lasix surgery on 11/18.  Patient had f/u 2 days ago.  Last night sitting on couch and felt something in eye.  Called optho today and left message.      Patient is currently on prednisone/moxifloxin gtts.          Review of Systems   Eyes: Positive for pain.   All other systems reviewed and are negative.      Past Medical History:   Diagnosis Date     Hypertension      Family History   Problem Relation Age of Onset     Other Cancer Mother      Coronary Artery Disease Father         age 53     Diabetes Father      Current Outpatient Medications   Medication Sig Dispense Refill     atorvastatin (LIPITOR) 20 MG tablet Take 1 tablet (20 mg) by mouth daily For cholesterol 90 tablet 1     fish oil-omega-3 fatty acids 1000 MG capsule Take 2 g by mouth daily       losartan-hydrochlorothiazide (HYZAAR) 100-12.5 MG tablet Take 1 tablet by mouth daily In AM for blood pressure. This is a combination pill of hydrochlorothiazide and cozaar 90 tablet 1     Loratadine (CLARITIN PO)  (Patient not taking: Reported on 11/26/2021)       metoprolol succinate ER (TOPROL-XL) 100 MG 24 hr tablet Take 1 tablet (100 mg) by mouth At Bedtime For blood pressure and slows pulse (Patient not taking: Reported on 11/26/2021) 90 tablet 1     Social History     Tobacco Use     Smoking status: Former Smoker     Types: Cigarettes     Smokeless tobacco: Never Used   Substance Use Topics     Alcohol use: Yes     Comment: Occ       OBJECTIVE  BP (!) 159/87   Pulse 89   Temp 97.5  F (36.4  C) (Tympanic)   Wt 103 kg (227 lb)   SpO2 97%   BMI 33.52 kg/m       Physical Exam  Vitals and nursing note reviewed.   Constitutional:       Appearance: Normal appearance. He is normal weight.   Eyes:      Extraocular Movements: Extraocular movements intact.      Conjunctiva/sclera: Conjunctivae normal.      Pupils: Pupils are equal, round, and reactive to light.      Comments: No FB noted.  No conjunctival erythema.  Eye lid eversion without any FB noted.  No florescein uptake.     Cardiovascular:      Rate and Rhythm: Normal rate.   Neurological:      Mental Status: He is alert.         Labs:  No results found for this or any previous visit (from the past 24 hour(s)).    X-Ray was not done.    ASSESSMENT:    No diagnosis found.     Medical Decision Making:    Differential Diagnosis:  Corneal, conjunctival uptake    Serious Comorbid Conditions:  Adult:  reviewed    PLAN:    Recommended he request a page from his ophthalmology surgeon and discuss symptoms.  Continue with currently gtts.  If worsening, go to ED.    Followup:    If not improving or if condition worsens, follow up with your Primary Care Provider, In 1  day(s) follow up with  Ophthalmology    There are no Patient Instructions on file for this visit.

## 2022-01-25 ENCOUNTER — ANCILLARY PROCEDURE (OUTPATIENT)
Dept: GENERAL RADIOLOGY | Facility: CLINIC | Age: 47
End: 2022-01-25
Attending: NURSE PRACTITIONER
Payer: COMMERCIAL

## 2022-01-25 ENCOUNTER — OFFICE VISIT (OUTPATIENT)
Dept: FAMILY MEDICINE | Facility: CLINIC | Age: 47
End: 2022-01-25
Payer: COMMERCIAL

## 2022-01-25 DIAGNOSIS — R07.9 CHEST PAIN IN ADULT: ICD-10-CM

## 2022-01-25 DIAGNOSIS — E78.5 DYSLIPIDEMIA: ICD-10-CM

## 2022-01-25 DIAGNOSIS — I10 BENIGN ESSENTIAL HYPERTENSION: Primary | ICD-10-CM

## 2022-01-25 LAB
ANION GAP SERPL CALCULATED.3IONS-SCNC: 7 MMOL/L (ref 3–14)
BASOPHILS # BLD AUTO: 0.1 10E3/UL (ref 0–0.2)
BASOPHILS NFR BLD AUTO: 1 %
BUN SERPL-MCNC: 18 MG/DL (ref 7–30)
CALCIUM SERPL-MCNC: 9.5 MG/DL (ref 8.5–10.1)
CHLORIDE BLD-SCNC: 108 MMOL/L (ref 94–109)
CHOLEST SERPL-MCNC: 257 MG/DL
CO2 SERPL-SCNC: 26 MMOL/L (ref 20–32)
CREAT SERPL-MCNC: 0.82 MG/DL (ref 0.66–1.25)
EOSINOPHIL # BLD AUTO: 0.3 10E3/UL (ref 0–0.7)
EOSINOPHIL NFR BLD AUTO: 4 %
ERYTHROCYTE [DISTWIDTH] IN BLOOD BY AUTOMATED COUNT: 12.9 % (ref 10–15)
FASTING STATUS PATIENT QL REPORTED: NO
GFR SERPL CREATININE-BSD FRML MDRD: >90 ML/MIN/1.73M2
GLUCOSE BLD-MCNC: 100 MG/DL (ref 70–99)
HCT VFR BLD AUTO: 43.3 % (ref 40–53)
HDLC SERPL-MCNC: 38 MG/DL
HGB BLD-MCNC: 15.1 G/DL (ref 13.3–17.7)
LDLC SERPL CALC-MCNC: 176 MG/DL
LYMPHOCYTES # BLD AUTO: 2.5 10E3/UL (ref 0.8–5.3)
LYMPHOCYTES NFR BLD AUTO: 28 %
MCH RBC QN AUTO: 29.4 PG (ref 26.5–33)
MCHC RBC AUTO-ENTMCNC: 34.9 G/DL (ref 31.5–36.5)
MCV RBC AUTO: 84 FL (ref 78–100)
MONOCYTES # BLD AUTO: 0.7 10E3/UL (ref 0–1.3)
MONOCYTES NFR BLD AUTO: 8 %
NEUTROPHILS # BLD AUTO: 5.3 10E3/UL (ref 1.6–8.3)
NEUTROPHILS NFR BLD AUTO: 60 %
NONHDLC SERPL-MCNC: 219 MG/DL
PLATELET # BLD AUTO: 291 10E3/UL (ref 150–450)
POTASSIUM BLD-SCNC: 3.6 MMOL/L (ref 3.4–5.3)
RBC # BLD AUTO: 5.13 10E6/UL (ref 4.4–5.9)
SODIUM SERPL-SCNC: 141 MMOL/L (ref 133–144)
TRIGL SERPL-MCNC: 213 MG/DL
WBC # BLD AUTO: 8.9 10E3/UL (ref 4–11)

## 2022-01-25 PROCEDURE — 71046 X-RAY EXAM CHEST 2 VIEWS: CPT | Performed by: RADIOLOGY

## 2022-01-25 PROCEDURE — 80061 LIPID PANEL: CPT | Performed by: NURSE PRACTITIONER

## 2022-01-25 PROCEDURE — 99214 OFFICE O/P EST MOD 30 MIN: CPT | Performed by: NURSE PRACTITIONER

## 2022-01-25 PROCEDURE — 80048 BASIC METABOLIC PNL TOTAL CA: CPT | Performed by: NURSE PRACTITIONER

## 2022-01-25 PROCEDURE — 85025 COMPLETE CBC W/AUTO DIFF WBC: CPT | Performed by: NURSE PRACTITIONER

## 2022-01-25 PROCEDURE — 36415 COLL VENOUS BLD VENIPUNCTURE: CPT | Performed by: NURSE PRACTITIONER

## 2022-01-25 PROCEDURE — 93000 ELECTROCARDIOGRAM COMPLETE: CPT | Performed by: NURSE PRACTITIONER

## 2022-01-25 RX ORDER — METOPROLOL SUCCINATE 100 MG/1
100 TABLET, EXTENDED RELEASE ORAL DAILY
Qty: 90 TABLET | Refills: 1 | Status: SHIPPED | OUTPATIENT
Start: 2022-01-25 | End: 2022-10-07

## 2022-01-25 RX ORDER — ATORVASTATIN CALCIUM 20 MG/1
20 TABLET, FILM COATED ORAL DAILY
Qty: 90 TABLET | Refills: 1 | Status: SHIPPED | OUTPATIENT
Start: 2022-01-25 | End: 2022-10-07

## 2022-01-25 RX ORDER — LOSARTAN POTASSIUM AND HYDROCHLOROTHIAZIDE 12.5; 1 MG/1; MG/1
1 TABLET ORAL DAILY
Qty: 90 TABLET | Refills: 1 | Status: SHIPPED | OUTPATIENT
Start: 2022-01-25 | End: 2022-10-07

## 2022-01-25 ASSESSMENT — MIFFLIN-ST. JEOR: SCORE: 1900.05

## 2022-01-25 NOTE — PROGRESS NOTES
-  Assessment & Plan     Benign essential hypertension  Chronic, not well controlled.  Has been out of medication and hasn't had consistent follow-up.  Restart medications as noted below.  Return for BP on ancillary scheduled in the next 2 weeks.  If stable, follow-up in 6 months. Discussed cutting out e-cig, watching salt intake and increasing exercise.   - Basic metabolic panel  (Ca, Cl, CO2, Creat, Gluc, K, Na, BUN); Future  - EKG 12-lead complete w/read - Clinics  - CBC with platelets and differential; Future  - losartan-hydrochlorothiazide (HYZAAR) 100-12.5 MG tablet; Take 1 tablet by mouth daily  - metoprolol succinate ER (TOPROL-XL) 100 MG 24 hr tablet; Take 1 tablet (100 mg) by mouth daily    Chest pain in adult  Intermittent for several months, non-exertional.  Expresses some anxiety.   Hx of CP in the past with normal stress echo in June 2020.   Normal CBC, BMP, EKG and CXR today.   Restart BP meds as noted above.   Discussed symptoms that would warrant more urgent evaluation.   Follow-up if not improving.   - Basic metabolic panel  (Ca, Cl, CO2, Creat, Gluc, K, Na, BUN); Future  - EKG 12-lead complete w/read - Clinics  - CBC with platelets and differential; Future  - XR Chest 2 Views; Future    Dyslipidemia  Chronic, not well controlled.   Restart atorvastatin.  Repeat fasting labs at next follow-up visit.   - Lipid panel reflex to direct LDL Non-fasting; Future  - atorvastatin (LIPITOR) 20 MG tablet; Take 1 tablet (20 mg) by mouth daily        Return in about 10 days (around 2/4/2022) for BP Recheck.    Jamaica Isaac, CNP  [facility]    Jacob   Rik Lyons is a 46 year old who presents for the following health issues     HPI       Here for follow-up on chronic conditions.  History of hypertension and hyperlipidemia.  Has been out of medication for several weeks.  Hasn't had labs in about 1.5 years.  At last office visit in May 2021 he had also run out of medication.    Has  "been having chest pain off and on since September of 2021.  This is not exertional.  States not so much of a pain, more of a tightness.  He wonders if it is related to anxiety.  Hx of heart disease in his father, starting in his 40s.   Maybe some SOB with exertion, otherwise denies.  No cough.  No leg swelling.  No dizziness or syncope.     Has low energy.     Worked up for sleep apnea- found to have mild apnea. Recommended cpap vs dental appliance.  He decided on dental appliance but it doesn't sound like he actually uses it.   Uses e-cig. States it is low nicotine.    Occasionally has GERD, but usually only after eating.    Patient had a normal stress test June 2020.         Review of Systems   Constitutional, HEENT, cardiovascular, pulmonary, gi and gu systems are negative, except as otherwise noted.      Objective    BP (!) 160/100   Pulse 91   Temp 97.7  F (36.5  C)   Ht 1.753 m (5' 9\")   Wt 103 kg (227 lb)   SpO2 97%   BMI 33.52 kg/m    Body mass index is 33.52 kg/m .  Physical Exam   GENERAL: healthy, alert and no distress  NECK: no adenopathy, no asymmetry, masses, or scars and thyroid normal to palpation  RESP: lungs clear to auscultation - no rales, rhonchi or wheezes  CV: regular rate and rhythm, normal S1 S2, no S3 or S4, no murmur, click or rub, no peripheral edema and peripheral pulses strong  ABDOMEN: soft, nontender, no hepatosplenomegaly, no masses and bowel sounds normal  MS: no gross musculoskeletal defects noted, no edema    Recent Labs   Lab Test 01/25/22  1456 06/26/20  0901    139   POTASSIUM 3.6 3.5   CHLORIDE 108 105   CO2 26 25   ANIONGAP 7 9   * 96   BUN 18 15   CR 0.82 0.80   JACKSON 9.5 9.2     CBC RESULTS: Recent Labs   Lab Test 01/25/22  1456   WBC 8.9   RBC 5.13   HGB 15.1   HCT 43.3   MCV 84   MCH 29.4   MCHC 34.9   RDW 12.9        EKG- SR without acute ischemic changes    CXR   IMPRESSION: Negative chest. No acute infiltrate or significant change.            "

## 2022-01-25 NOTE — PATIENT INSTRUCTIONS
EKG looks okay.  Labs and chest xray in process- I will be in contact with results via [employer].   To the ER with worsening pain, palpitations, new shortness of breath, dizziness, weakness.    Reassuring that you had a normal stress test last in 2020.    Restart medications- sent to pharmacy.   Return for BP check in near future.   Quit smoking (e-cig)- nicotine increases your blood pressure.  Reduce salt.  Increase exercise.    Need to keep consistent visits/appointments- at least every 6 months for labs and blood pressure check.

## 2022-01-26 VITALS
OXYGEN SATURATION: 97 % | WEIGHT: 227 LBS | DIASTOLIC BLOOD PRESSURE: 100 MMHG | SYSTOLIC BLOOD PRESSURE: 160 MMHG | HEART RATE: 91 BPM | HEIGHT: 69 IN | TEMPERATURE: 97.7 F | BODY MASS INDEX: 33.62 KG/M2

## 2022-02-04 ENCOUNTER — ALLIED HEALTH/NURSE VISIT (OUTPATIENT)
Dept: FAMILY MEDICINE | Facility: CLINIC | Age: 47
End: 2022-02-04
Payer: COMMERCIAL

## 2022-02-04 VITALS — SYSTOLIC BLOOD PRESSURE: 130 MMHG | DIASTOLIC BLOOD PRESSURE: 78 MMHG

## 2022-02-04 DIAGNOSIS — I10 ESSENTIAL HYPERTENSION: Primary | ICD-10-CM

## 2022-02-04 PROCEDURE — 99207 PR NO CHARGE NURSE ONLY: CPT

## 2022-02-04 NOTE — PROGRESS NOTES
I met with Rik Lyons at the request of Jamaica Isaac to recheck his blood pressure.  Blood pressure medications on the med list were reviewed with patient.    Patient has taken all medications as per usual regimen: Yes  Patient reports tolerating them without any issues or concerns: No    There were no vitals filed for this visit.    Blood pressure was taken, previous encounter was reviewed, recorded blood pressure below 140/90.  Patient was discharged and the note will be sent to the provider for final review.   BP Readings from Last 3 Encounters:   01/25/22 (!) 160/100   11/26/21 (!) 159/87   05/18/21 (!) 147/96   Today was 130/78.    Caroline Mohr MA on 2/4/2022 at 1:36 PM

## 2022-03-13 ENCOUNTER — APPOINTMENT (OUTPATIENT)
Dept: URGENT CARE | Facility: CLINIC | Age: 47
End: 2022-03-13
Payer: COMMERCIAL

## 2022-05-08 ENCOUNTER — HEALTH MAINTENANCE LETTER (OUTPATIENT)
Age: 47
End: 2022-05-08

## 2022-08-17 ENCOUNTER — OFFICE VISIT (OUTPATIENT)
Dept: URGENT CARE | Facility: URGENT CARE | Age: 47
End: 2022-08-17
Payer: COMMERCIAL

## 2022-08-17 ENCOUNTER — OFFICE VISIT (OUTPATIENT)
Dept: OPTOMETRY | Facility: CLINIC | Age: 47
End: 2022-08-17
Payer: COMMERCIAL

## 2022-08-17 VITALS
RESPIRATION RATE: 18 BRPM | BODY MASS INDEX: 33.37 KG/M2 | SYSTOLIC BLOOD PRESSURE: 158 MMHG | HEART RATE: 94 BPM | DIASTOLIC BLOOD PRESSURE: 98 MMHG | OXYGEN SATURATION: 96 % | WEIGHT: 226 LBS | TEMPERATURE: 97.5 F

## 2022-08-17 DIAGNOSIS — T15.91XA FOREIGN BODY OF RIGHT EYE, INITIAL ENCOUNTER: Primary | ICD-10-CM

## 2022-08-17 DIAGNOSIS — Z98.890 HX OF LASIK: ICD-10-CM

## 2022-08-17 DIAGNOSIS — S05.01XA ABRASION OF RIGHT CORNEA, INITIAL ENCOUNTER: Primary | ICD-10-CM

## 2022-08-17 PROCEDURE — 99213 OFFICE O/P EST LOW 20 MIN: CPT | Performed by: INTERNAL MEDICINE

## 2022-08-17 PROCEDURE — 92002 INTRM OPH EXAM NEW PATIENT: CPT | Performed by: OPTOMETRIST

## 2022-08-17 RX ORDER — MOXIFLOXACIN 5 MG/ML
1 SOLUTION/ DROPS OPHTHALMIC
Qty: 3 ML | Refills: 1 | Status: SHIPPED | OUTPATIENT
Start: 2022-08-17 | End: 2022-10-07

## 2022-08-17 RX ORDER — ERYTHROMYCIN 5 MG/G
0.5 OINTMENT OPHTHALMIC AT BEDTIME
Qty: 3.5 G | Refills: 0 | Status: SHIPPED | OUTPATIENT
Start: 2022-08-17 | End: 2022-08-24

## 2022-08-17 ASSESSMENT — VISUAL ACUITY
METHOD: SNELLEN - LINEAR
OS_SC: 20/25
OD_SC+: -1
OD_SC: 20/40

## 2022-08-17 ASSESSMENT — SLIT LAMP EXAM - LIDS: COMMENTS: NORMAL

## 2022-08-17 ASSESSMENT — EXTERNAL EXAM - RIGHT EYE: OD_EXAM: NORMAL

## 2022-08-17 ASSESSMENT — EXTERNAL EXAM - LEFT EYE: OS_EXAM: NORMAL

## 2022-08-17 NOTE — PATIENT INSTRUCTIONS
Referral to [facility] due to corneal abrasion close to the Lasik flap.  Consulted per phone with Dr. Ramos.  She recommended Vigamox every few hours then Erythromycin ointment at night and she will see the patient tomorrow.    Appointment at 8:15 am tomorrow 8/18/2022 at the [facility].    Naveed Harding, AJAY

## 2022-08-17 NOTE — LETTER
REPORT OF WORK COMP    [address] [address]  [phone]      PATIENT DATA    Employee Name: Rik Lyons      : 1975     #: [id number]    Work related injury: Yes  Employer at time of injury:    Employer contact & phone:    Employed elsewhere?    Workers' Compensation Carrier/Managed Care Plan:       Today's date: 2022  Date of injury: 2022  Date of first visit: 2022      PROVIDER EVALUATION: Please fill in as needed.  Please give copy to employee for employer.    1. Diagnosis: Corneal abrasion right eye   2. Treatment: eyedrops.  3. Medication: Vigamox,Erythromycin pradeep  NOTE: When ordering a medication, [place] Rules require Work Comp or WC on prescriptions.    Referral to corneal specialist. [facility] tomorrow 2022 for evaluation.    RESTRICTIONS: Unlimited unless listed.  Restrictions apply to home and leisure also.  If work restrictions is not available, the employee is totally disabled.    Medical Examiner: Naveed Harding OD  License or registration: [id number]    Next appointment: As needed

## 2022-08-17 NOTE — PROGRESS NOTES
SUBJECTIVE:  Rik Lyons is an 46 year old male who presents for was working on floor boards and using saw and something shot into right eye.  Occurred about an hour and a half ago.  Tried putting some artificial tears in but not help.  Can see out of that eye but mostly trying to keep it closed because it hurts to move eye.  When moves eye feels pain and feels like something still in eye.    PMH:   has a past medical history of Hypertension.  Patient Active Problem List   Diagnosis     Headache     Cervicalgia     Benign essential hypertension     Chronic bilateral low back pain with bilateral sciatica     Finger joint stiff, right     Class 1 obesity due to excess calories without serious comorbidity with body mass index (BMI) of 33.0 to 33.9 in adult     Social History     Socioeconomic History     Marital status:      Spouse name: None     Number of children: None     Years of education: None     Highest education level: None   Tobacco Use     Smoking status: Former Smoker     Types: Cigarettes     Smokeless tobacco: Never Used   Substance and Sexual Activity     Alcohol use: Yes     Comment: Occ     Drug use: No     Sexual activity: Yes     Partners: Female     Birth control/protection: None   Other Topics Concern     Parent/sibling w/ CABG, MI or angioplasty before 65F 55M? No     Family History   Problem Relation Age of Onset     Other Cancer Mother      Coronary Artery Disease Father         age 53     Diabetes Father        ALLERGIES:  Patient has no known allergies.    Current Outpatient Medications   Medication     atorvastatin (LIPITOR) 20 MG tablet     losartan-hydrochlorothiazide (HYZAAR) 100-12.5 MG tablet     metoprolol succinate ER (TOPROL-XL) 100 MG 24 hr tablet     fish oil-omega-3 fatty acids 1000 MG capsule     Loratadine (CLARITIN PO)     No current facility-administered medications for this visit.         ROS:  ROS is done and is negative for general/constitutional, eye, ENT,  Respiratory, cardiovascular, GI, , Skin, musculoskeletal except as noted elsewhere.  All other review of systems negative except as noted elsewhere.      OBJECTIVE:  BP (!) 158/98   Pulse 94   Temp 97.5  F (36.4  C) (Tympanic)   Resp 18   Wt 102.5 kg (226 lb)   SpO2 96%   BMI 33.37 kg/m    GENERAL APPEARANCE: Alert, in no acute distress  EYES: right eye- teary, mild erythema of conjunctiva, pain with eom testing, two drops of procaine given which improved his pain significantly, no foreign body visualized on my exam      RESULTS  No results found for any visits on 08/17/22..  No results found for this or any previous visit (from the past 48 hour(s)).    ASSESSMENT/PLAN:    ASSESSMENT / PLAN:  (T15.91XA) Foreign body of right eye, initial encounter  (primary encounter diagnosis)  Comment: pt's hx and sxs c/w foreign body in eye, but no fb identified on my exam  Plan: given pt's sxs I suspect he may still have fb in eye even though not visualized on my exam, so sending to optometry for further evaluation.  appt scheduled at [facility] for him to be seen in about 45 min so has time to drive there and check in.      See [facility] for orders, medications, letters, patient instructions    Cris Hawkins M.D.

## 2022-08-17 NOTE — PROGRESS NOTES
Chief Complaint   Patient presents with     Eye Problem        Rik Lyons is an 46 year old male who presents for was working on floor boards and using saw and something shot into right eye.  Occurred about an hour and a half ago.  Tried putting some artificial tears in but not help.  Can see out of that eye but mostly trying to keep it closed because it hurts to move eye.  When moves eye feels pain and feels like something still in eye.        Fair amount of velocity. Was working with wood.  Safety glasses were fogging up so they weren't flush with face.    History of Lasik- both eyes - 2012  PRK- both eyes 1 year ago- monovision  [facility]- Dr. Hoang Hurt    Sent from Urgent Care    Medical, surgical and family histories reviewed and updated 8/17/2022.       OBJECTIVE: See Ophthalmology exam    ASSESSMENT:    ICD-10-CM    1. Abrasion of right cornea, initial encounter  S05.01XA moxifloxacin (VIGAMOX) 0.5 % ophthalmic solution     erythromycin (ROMYCIN) 5 MG/GM ophthalmic ointment   2. Hx of LASIK  Z98.890       PLAN:     Patient Instructions   Referral to [facility] due to corneal abrasion close to the Lasik flap.  Consulted per phone with Dr. Ramos.  She recommended Vigamox every few hours then Erythromycin ointment at night and she will see the patient tomorrow.    Appointment at 8:15 am tomorrow 8/18/2022 at the [facility].    Naveed Harding, OD

## 2022-08-17 NOTE — LETTER
8/17/2022         RE: Rik Lyons  [address] [place] [facility] [address]        Dear Colleague,    Thank you for referring your patient, Rik Lyons, to the [facility]. Please see a copy of my visit note below.    Chief Complaint   Patient presents with     Eye Problem        Rik Lyons is an 46 year old male who presents for was working on floor boards and using saw and something shot into right eye.  Occurred about an hour and a half ago.  Tried putting some artificial tears in but not help.  Can see out of that eye but mostly trying to keep it closed because it hurts to move eye.  When moves eye feels pain and feels like something still in eye.        Fair amount of velocity. Was working with wood.  Safety glasses were fogging up so they weren't flush with face.    History of Lasik- both eyes - 2012  PRK- both eyes 1 year ago- monovision  [facility]- Dr. Hoang Hurt    Sent from Urgent Care    Medical, surgical and family histories reviewed and updated 8/17/2022.       OBJECTIVE: See Ophthalmology exam    ASSESSMENT:    ICD-10-CM    1. Abrasion of right cornea, initial encounter  S05.01XA moxifloxacin (VIGAMOX) 0.5 % ophthalmic solution     erythromycin (ROMYCIN) 5 MG/GM ophthalmic ointment   2. Hx of LASIK  Z98.890       PLAN:     Patient Instructions   Referral to [facility] due to corneal abrasion close to the Lasik flap.  Consulted per phone with Dr. Ramos.  She recommended Vigamox every few hours then Erythromycin ointment at night and she will see the patient tomorrow.    Appointment at 8:15 am tomorrow 8/18/2022 at the [facility].    Naveed Harding, AJAY             Again, thank you for allowing me to participate in the care of your patient.        Sincerely,        Naveed Harding, OD

## 2022-10-06 NOTE — PROGRESS NOTES
ASSESSMENT / PLAN:  (I10) Benign essential hypertension  (primary encounter diagnosis)  Comment: needs help butoff meds  Plan: metoprolol succinate ER (TOPROL XL) 100 MG 24         hr tablet, losartan-hydrochlorothiazide         (HYZAAR) 100-12.5 MG tablet, Renal panel        Continue self-monitor. Recheck in 6 months  Sooner if worse. Limit sodium. Exercise.     (Z12.11) Screen for colon cancer  Plan: Adult GI  Referral - Procedure Only        Patient will check insurance.     (E78.5) Dyslipidemia  Plan: atorvastatin (LIPITOR) 20 MG tablet, Lipid         Profile        Restart lipitor. Reveiwed risks and side effects of medication  Fasting lab in next week. Chest pain or shortness of breath to er. Lower carb diet.    (F41.9) Anxiety  Comment: intermittent  Plan: ALPRAZolam (XANAX) 0.25 MG tablet        Meditation/deep breasthing. Limit caffeine and avoid ALCOHOL. Consider ssri or therapist. Patient would like rare prn. Med. 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   No chest pain or shortness of breath. Out of lipitor. Good exercise tolerance in past.   Ran out blood pressure meds and lipitor 2weeks.   Increase fiber in diet. No ALCOHOL. No pop. Emotionally doing ok.   Forgotful of toprol at bedtime. Mild sean.   Golfing. No nausea, vomiting or diarrhea or black/bloody stools. No urine changes.   Colonoscopy interested.   No chief complaint on file.      History of Present Illness       Hypertension: He presents for follow up of hypertension.  He does check blood pressure  regularly outside of the clinic. Outside blood pressures have been over 140/90. He follows a low salt diet.       Has been out of Lipitor & Hyzaar for about 2 weeks and forgets  to take Metoprolol at night          Objective    There were no vitals taken for this visit.  There is no height or weight on file to calculate BMI.   BP (!) 151/93   Pulse 92   Temp 98.6  F (37  C) (Oral)   Wt 102.2 kg (225 lb 6.4 oz)   SpO2 97%   BMI 33.29 kg/m     Physical Exam   GENERAL: healthy, alert and no distress  EYES: Eyes grossly normal to inspection, PERRL and conjunctivae and sclerae normal  NECK: no adenopathy, no asymmetry, masses, or scars and thyroid normal to palpation  RESP: lungs clear to auscultation - no rales, rhonchi or wheezes  CV: regular rate and rhythm, normal S1 S2, no S3 or S4, no murmur, click or rub, no peripheral edema and peripheral pulses strong  ABDOMEN: soft, nontender, no hepatosplenomegaly, no masses and bowel sounds normal  MS: no gross musculoskeletal defects noted, no edema  PSYCH: mentation appears normal, affect normal/bright  PSYCH: mild anxious

## 2022-10-07 ENCOUNTER — OFFICE VISIT (OUTPATIENT)
Dept: FAMILY MEDICINE | Facility: CLINIC | Age: 47
End: 2022-10-07
Payer: COMMERCIAL

## 2022-10-07 VITALS
TEMPERATURE: 98.6 F | HEART RATE: 92 BPM | DIASTOLIC BLOOD PRESSURE: 93 MMHG | BODY MASS INDEX: 33.29 KG/M2 | OXYGEN SATURATION: 97 % | SYSTOLIC BLOOD PRESSURE: 151 MMHG | WEIGHT: 225.4 LBS

## 2022-10-07 DIAGNOSIS — F41.9 ANXIETY: ICD-10-CM

## 2022-10-07 DIAGNOSIS — E78.5 DYSLIPIDEMIA: ICD-10-CM

## 2022-10-07 DIAGNOSIS — Z12.11 SCREEN FOR COLON CANCER: ICD-10-CM

## 2022-10-07 DIAGNOSIS — Z12.5 SCREENING PSA (PROSTATE SPECIFIC ANTIGEN): ICD-10-CM

## 2022-10-07 DIAGNOSIS — I10 BENIGN ESSENTIAL HYPERTENSION: Primary | ICD-10-CM

## 2022-10-07 PROCEDURE — 99214 OFFICE O/P EST MOD 30 MIN: CPT | Performed by: FAMILY MEDICINE

## 2022-10-07 RX ORDER — ATORVASTATIN CALCIUM 20 MG/1
20 TABLET, FILM COATED ORAL DAILY
Qty: 90 TABLET | Refills: 1 | Status: SHIPPED | OUTPATIENT
Start: 2022-10-07 | End: 2023-07-07

## 2022-10-07 RX ORDER — LOSARTAN POTASSIUM AND HYDROCHLOROTHIAZIDE 12.5; 1 MG/1; MG/1
1 TABLET ORAL DAILY
Qty: 90 TABLET | Refills: 1 | Status: SHIPPED | OUTPATIENT
Start: 2022-10-07 | End: 2023-07-07

## 2022-10-07 RX ORDER — ALPRAZOLAM 0.25 MG
TABLET ORAL
Qty: 10 TABLET | Refills: 0 | Status: SHIPPED | OUTPATIENT
Start: 2022-10-07

## 2022-10-07 RX ORDER — METOPROLOL SUCCINATE 100 MG/1
100 TABLET, EXTENDED RELEASE ORAL DAILY
Qty: 90 TABLET | Refills: 1 | Status: SHIPPED | OUTPATIENT
Start: 2022-10-07 | End: 2023-07-07

## 2022-10-07 ASSESSMENT — PAIN SCALES - GENERAL: PAINLEVEL: NO PAIN (0)

## 2022-10-12 ENCOUNTER — HOSPITAL ENCOUNTER (OUTPATIENT)
Facility: AMBULATORY SURGERY CENTER | Age: 47
End: 2022-10-12
Attending: FAMILY MEDICINE | Admitting: FAMILY MEDICINE
Payer: COMMERCIAL

## 2022-10-12 ENCOUNTER — TELEPHONE (OUTPATIENT)
Dept: GASTROENTEROLOGY | Facility: CLINIC | Age: 47
End: 2022-10-12

## 2022-10-12 DIAGNOSIS — Z12.11 SPECIAL SCREENING FOR MALIGNANT NEOPLASMS, COLON: Primary | ICD-10-CM

## 2022-10-12 NOTE — TELEPHONE ENCOUNTER
Screening Questions  BLUE  KIND OF PREP RED  LOCATION [review exclusion criteria] GREEN  SEDATION TYPE    Y Are you active on mychart?   Darrell Jones MD  Ordering/Referring Provider?    [facility]  What type of coverage do you have?  N  Have you had a positive covid test in the last 90 days?     33.2  1. BMI  [BMI 40+ - review exclusion criteria]    SELF   2. Are you able to give consent for your medical care? [IF NO,RN REVIEW]        N  3. Are you taking any prescription pain medications on a routine schedule?        N 3a. EXTENDED PREP What kind of prescription?     N 4. Do you have any chemical dependencies such as alcohol, street drugs, or methadone?    N 5. Do you have any history of post-traumatic stress syndrome, severe anxiety or history of psychosis?      **If yes 3- 5 , please schedule with MAC sedation.**          IF YES TO ANY 6 - 10 - HOSPITAL SETTING ONLY.     N 6.   Do you need assistance transferring?     N 7.   Have you had a heart or lung transplant?    N 8.   Are you currently on dialysis?   N 9.   Do you use daily home oxygen?   N 10. Do you take nitroglycerin?   10a. N If yes, how often?     11. [FEMALES]   Are you currently pregnant?     11a.  If yes, how many weeks? [ Greater than 12 weeks, OR NEEDED]    N 12. Do you have Pulmonary Hypertension?   *NEED PAC APPT AT [facility]*     N 13. [review exclusion criteria]  Do you have any implantable devices in your body (pacemaker, defib, LVAD)?    N 14. In the past 6 months, have you had any heart related issues including cardiomyopathy or heart attack?     N 14a. If yes, did it require cardiac stenting if so when?     N 15. Have you had a stroke or Transient ischemic attack (TIA - aka  mini stroke ) within 6 months?      N 16. Do you have mod to severe Obstructive Sleep Apnea?  [Hospital only - Ok at [place]]    N 17. Do you have SEVERE AND UNCONTROLLED asthma?   *NEED PAC APPT AT [facility]*     N 18. Are you currently taking any blood thinners?     18a.  "If yes, inform patient to \"follow up w/ ordering provider for bridging instructions.\"    N 19. Do you take the medication Phentermine?    19a. If yes, \"Hold for 7 days before procedure.  Please consult your prescribing provider if you have questions about holding this medication.\"     N  20. Do you have chronic kidney disease?      N  21. Do you have a diagnosis of diabetes?     N  22. On a regular basis do you go 3-5 days between bowel movements?     23. Preferred LOCAL Pharmacy for Pre Prescription    [ LIST ONLY ONE PHARMACY]     [id number] - [place], [place] - [address] [facility] [address] AT SEC OF [address] [employer]        - CLOSING REMINDERS -    Informed patient they will need an adult    Cannot take any type of public or medical transportation alone    Conscious Sedation- Needs  for 6 hours after the procedure       MAC/General-Needs  for 24 hours after procedure    Pre-Procedure Covid test to be completed [[facility] PCR Testing Required]    Confirmed Nurse will call to complete assessment       - SCHEDULING DETAILS -    LUZMARIA   Surgeon   01/13/23   Date of Procedure  Type of Procedure Scheduled  Lower Endoscopy [Colonoscopy]    MG  Location  Which Colonoscopy Prep was Sent?     [facility] PREP-If you answer yes to questions #8, #20, #21   MOD  Sedation Type     N PAC / Pre-op Required         Additional comments:  N      "

## 2022-10-28 ENCOUNTER — LAB (OUTPATIENT)
Dept: LAB | Facility: CLINIC | Age: 47
End: 2022-10-28
Payer: COMMERCIAL

## 2022-10-28 DIAGNOSIS — Z12.5 SCREENING PSA (PROSTATE SPECIFIC ANTIGEN): ICD-10-CM

## 2022-10-28 DIAGNOSIS — E78.5 DYSLIPIDEMIA: ICD-10-CM

## 2022-10-28 DIAGNOSIS — I10 BENIGN ESSENTIAL HYPERTENSION: ICD-10-CM

## 2022-10-28 LAB
ALBUMIN SERPL-MCNC: 4.1 G/DL (ref 3.4–5)
ANION GAP SERPL CALCULATED.3IONS-SCNC: 8 MMOL/L (ref 3–14)
BUN SERPL-MCNC: 20 MG/DL (ref 7–30)
CALCIUM SERPL-MCNC: 9.4 MG/DL (ref 8.5–10.1)
CHLORIDE BLD-SCNC: 108 MMOL/L (ref 94–109)
CHOLEST SERPL-MCNC: 163 MG/DL
CO2 SERPL-SCNC: 25 MMOL/L (ref 20–32)
CREAT SERPL-MCNC: 0.86 MG/DL (ref 0.66–1.25)
FASTING STATUS PATIENT QL REPORTED: YES
GFR SERPL CREATININE-BSD FRML MDRD: >90 ML/MIN/1.73M2
GLUCOSE BLD-MCNC: 108 MG/DL (ref 70–99)
HDLC SERPL-MCNC: 43 MG/DL
LDLC SERPL CALC-MCNC: 100 MG/DL
NONHDLC SERPL-MCNC: 120 MG/DL
PHOSPHATE SERPL-MCNC: 3.3 MG/DL (ref 2.5–4.5)
POTASSIUM BLD-SCNC: 3.6 MMOL/L (ref 3.4–5.3)
PSA SERPL-MCNC: 1.13 UG/L (ref 0–4)
SODIUM SERPL-SCNC: 141 MMOL/L (ref 133–144)
TRIGL SERPL-MCNC: 100 MG/DL

## 2022-10-28 PROCEDURE — 36415 COLL VENOUS BLD VENIPUNCTURE: CPT

## 2022-10-28 PROCEDURE — 80069 RENAL FUNCTION PANEL: CPT

## 2022-10-28 PROCEDURE — 80061 LIPID PANEL: CPT

## 2022-10-28 PROCEDURE — G0103 PSA SCREENING: HCPCS

## 2022-12-29 ENCOUNTER — NURSE TRIAGE (OUTPATIENT)
Dept: NURSING | Facility: CLINIC | Age: 47
End: 2022-12-29

## 2022-12-29 NOTE — TELEPHONE ENCOUNTER
Nurse Triage SBAR    Is this a 2nd Level Triage? YES, LICENSED PRACTITIONER REVIEW IS REQUIRED    Situation:  Dizzy spells  High BP     Background:   Pt has high BP and takes BP meds  Lightheaded x 1 week, on and off    Assessment:   BP Readings - using a wrist BP cuff:  150/110 and 166/102    Pt reports feelings of lightheadedness with sudden head movements     Blurry vision sometimes at bedtime but not now  No headache now  Can still move around but prefers not to drive    Pt has taken dramamine but lightheadedness still an ongoing issue.    Protocol Recommended Disposition:   See Within 3 Days In Office , Discuss With PCP And Callback By Nurse Today    Recommendation:   No openings at clinic until end of January   Does PCP have same day slots open or does PCP recommend BP check?    Routed to provider  Please call pt [phone]    Does the patient meet one of the following criteria for ADS visit consideration? 16+ years old, with an [facility] PCP     TIP  Providers, please consider if this condition is appropriate for management at one of our Acute and Diagnostic Services sites.     If patient is a good candidate, please use dotphrase <dot>triageresponse and select Refer to ADS to document.    Melanie Ledezma RN/[facility] Nurse Advisor        Reason for Disposition    Taking a medicine that could cause dizziness (e.g., blood pressure medications, diuretics)    Systolic BP >= 160 OR Diastolic >= 100    Additional Information    Negative: SEVERE difficulty breathing (e.g., struggling for each breath, speaks in single words)    Negative: Shock suspected (e.g., cold/pale/clammy skin, too weak to stand, low BP, rapid pulse)    Negative: Difficult to awaken or acting confused (e.g., disoriented, slurred speech)    Negative: Fainted, and still feels dizzy afterwards    Negative: Overdose (accidental or intentional) of medications    Negative: New neurologic deficit that is present now: * Weakness of the face, arm, or leg on  one side of the body * Numbness of the face, arm, or leg on one side of the body * Loss of speech or garbled speech    Negative: Heart beating < 50 beats per minute OR > 140 beats per minute    Negative: Sounds like a life-threatening emergency to the triager    Negative: Chest pain    Negative: Rectal bleeding, bloody stool, or tarry-black stool    Negative: Vomiting is main symptom    Negative: Diarrhea is main symptom    Negative: Headache is main symptom    Negative: Heat exhaustion suspected (i.e., dehydration from heat exposure)    Negative: Patient states that they are having an anxiety or panic attack    Negative: Dizziness from low blood sugar (i.e., < 60 mg/dl or 3.5 mmol/l)    Negative: SEVERE dizziness (e.g., unable to stand, requires support to walk, feels like passing out now)    Negative: SEVERE headache or neck pain    Negative: Spinning or tilting sensation (vertigo) present now and one or more stroke risk factors (i.e., hypertension, diabetes mellitus, prior stroke/TIA, heart attack, age over 60) (Exception: prior physician evaluation for this AND no different/worse than usual)    Negative: Neurologic deficit that was brief (now gone), ANY of the following:* Weakness of the face, arm, or leg on one side of the body* Numbness of the face, arm, or leg on one side of the body* Loss of speech or garbled speech    Negative: Loss of vision or double vision  (Exception: Similar to previous migraines.)    Negative: Extra heart beats OR irregular heart beating (i.e., 'palpitations')    Negative: Difficulty breathing    Negative: Drinking very little and has signs of dehydration (e.g., no urine > 12 hours, very dry mouth, very lightheaded)    Negative: Follows bleeding (e.g., stomach, rectum, vagina)  (Exception: Became dizzy from sight of small amount blood.)    Negative: Patient sounds very sick or weak to the triager    Negative: Lightheadedness (dizziness) present now, after 2 hours of rest and fluids     Negative: Spinning or tilting sensation (vertigo) present now    Negative: Fever > 103 F (39.4 C)    Negative: Fever > 100.0 F (37.8 C) and has diabetes mellitus or a weak immune system (e.g., HIV positive, cancer chemotherapy, organ transplant, splenectomy, chronic steroids)    Negative: MODERATE dizziness (e.g., interferes with normal activities) (Exception: dizziness caused by heat exposure, sudden standing, or poor fluid intake)    Negative: Vomiting occurs with dizziness    Negative: Patient wants to be seen    Negative: Sounds like a life-threatening emergency to the triager    Negative: Pregnant > 20 weeks or postpartum (< 6 weeks after delivery) and new hand or face swelling    Negative: Pregnant > 20 weeks and BP > 140/90    Negative: Systolic BP >= 160 OR Diastolic >= 100, and any cardiac or neurologic symptoms (e.g., chest pain, difficulty breathing, unsteady gait, blurred vision)    Negative: Patient sounds very sick or weak to the triager    Negative: BP Systolic BP >= 140 OR Diastolic >= 90 and postpartum (from 0 to 6 weeks after delivery)    Negative: Systolic BP >= 180 OR Diastolic >= 110, and missed most recent dose of blood pressure medication    Negative: Systolic BP >= 180 OR Diastolic >= 110    Negative: Patient wants to be seen    Negative: Ran out of BP medications    Negative: Taking BP medications and feels is having side effects (e.g., impotence, cough, dizziness)    Protocols used: DIZZINESS-A-OH, HIGH BLOOD PRESSURE-A-OH

## 2022-12-30 ENCOUNTER — E-VISIT (OUTPATIENT)
Dept: FAMILY MEDICINE | Facility: CLINIC | Age: 47
End: 2022-12-30
Payer: COMMERCIAL

## 2022-12-30 DIAGNOSIS — R42 VERTIGO: ICD-10-CM

## 2022-12-30 DIAGNOSIS — I10 BENIGN ESSENTIAL HYPERTENSION: Primary | Chronic | ICD-10-CM

## 2022-12-30 PROCEDURE — 99422 OL DIG E/M SVC 11-20 MIN: CPT | Performed by: FAMILY MEDICINE

## 2022-12-30 RX ORDER — AMLODIPINE BESYLATE 5 MG/1
5 TABLET ORAL DAILY
Qty: 30 TABLET | Refills: 2 | Status: SHIPPED | OUTPATIENT
Start: 2022-12-30 | End: 2023-07-07

## 2022-12-30 NOTE — TELEPHONE ENCOUNTER
"Called and spoke with patient.  Feeling okay at this time. Still feeling lightheaded. Occurs almost all of the time. No falls or feeling like passing out. Thought at first was vertigo like, has had this in the past, but now feels like is more just a lightheaded and woozy feeling. If sits still and lays on couch, is \"just fine\".  Denies chest pain, SOB, blurred vision, headaches, feeling weak and tired, numbness or tingling, nausea or vomiting, heart palpitations. Head does not feel like it is spinning or tilting.  Writer advised of symptoms to monitor self for and what symptoms warranted patient going to ER. Discussed if BP gets to 160/110 or higher, to go to ED.  Encouraged good hydration and to avoid any caffeine or alcohol at this time.   Patient did state he has been taking BP meds daily as ordered, has not missed any doses.  See noted BP readings below in previous triage note.    Will route encounter back to PCP to further advise on triage dispo below and plan.      Edelmira Mendiola RN  [facility]      "

## 2022-12-30 NOTE — TELEPHONE ENCOUNTER
[facility]   Tamia Eng,     [facility]

## 2023-01-02 ENCOUNTER — TELEPHONE (OUTPATIENT)
Dept: OTOLARYNGOLOGY | Facility: CLINIC | Age: 48
End: 2023-01-02

## 2023-01-02 NOTE — TELEPHONE ENCOUNTER
[facility] Call Center    Phone Message    May a detailed message be left on voicemail: yes     Reason for Call: Other: Pt's spouse calling to schedule pt for symptoms of vertigo. Pt prefers [facility], however writer unable to pull schedule for [facility]. Please advise.     Action Taken: Other: [facility]    Travel Screening: Not Applicable

## 2023-01-06 RX ORDER — BISACODYL 5 MG
TABLET, DELAYED RELEASE (ENTERIC COATED) ORAL
Qty: 4 TABLET | Refills: 0 | Status: SHIPPED | OUTPATIENT
Start: 2023-01-06 | End: 2023-01-12 | Stop reason: HOSPADM

## 2023-01-11 RX ORDER — ONDANSETRON 2 MG/ML
4 INJECTION INTRAMUSCULAR; INTRAVENOUS
Status: CANCELLED | OUTPATIENT
Start: 2023-01-11

## 2023-01-11 RX ORDER — LIDOCAINE 40 MG/G
CREAM TOPICAL
Status: CANCELLED | OUTPATIENT
Start: 2023-01-11

## 2023-01-12 ENCOUNTER — TELEPHONE (OUTPATIENT)
Dept: GASTROENTEROLOGY | Facility: CLINIC | Age: 48
End: 2023-01-12

## 2023-01-12 NOTE — TELEPHONE ENCOUNTER
Caller: Rik  Reason for Reschedule/Cancellation (please be detailed, any staff messages or encounters to note?): patient did not prep      Prior to reschedule please review:    Ordering Provider:Robert    Sedation per order:CS    Does patient have any ASC Exclusions, please identify?: N      Notes on Cancelled Procedure:    Procedure:Lower Endoscopy [Colonoscopy]     Date: 1/13/23    Location:[facility]; [address], [place] [address]    Surgeon: Diana        Rescheduled: Yes    Procedure: Lower Endoscopy [Colonoscopy]     Date: 3/24/23    Location:[facility]; [address], [place] [address]    Surgeon: Maria Teresa    Sedation Level Scheduled  CS,  Reason for Sedation Level protocol    Prep/Instructions updated and sent: Yes

## 2023-01-14 ENCOUNTER — HEALTH MAINTENANCE LETTER (OUTPATIENT)
Age: 48
End: 2023-01-14

## 2023-03-09 ENCOUNTER — TELEPHONE (OUTPATIENT)
Dept: GASTROENTEROLOGY | Facility: CLINIC | Age: 48
End: 2023-03-09
Payer: COMMERCIAL

## 2023-03-09 DIAGNOSIS — Z12.11 ENCOUNTER FOR SCREENING COLONOSCOPY: Primary | ICD-10-CM

## 2023-03-09 RX ORDER — BISACODYL 5 MG/1
TABLET, DELAYED RELEASE ORAL
Qty: 4 TABLET | Refills: 0 | Status: SHIPPED | OUTPATIENT
Start: 2023-03-09 | End: 2023-05-19

## 2023-03-09 NOTE — TELEPHONE ENCOUNTER
Pre assessment questions completed for upcoming Colonoscopy  procedure scheduled on 3/24/23    COVID policy reviewed.     Pre-op exam? N/A    Reviewed procedural arrival time 0645, procedure time 0730 and facility location [facility]; [address], [place] [address]    Designated  policy reviewed. Instructed to have someone stay 6 hours post procedure.     Anticoagulation/blood thinners? No    Electronic implanted devices? No    Diabetic? No    Procedure indication: screening     Bowel prep recommendation: Standard Golytely d/t mg citrate recall.    Reviewed procedure prep instructions.     Prep instructions sent via Poup.  Bowel prep script sent to [facility] #[id number] - [place], [place] - [address] AT [address] OF [facility].     Patient verbalized understanding and had no questions or concerns at this time.    Elizabeth Reyes RN  Endoscopy Procedure Pre Assessment RN

## 2023-03-10 ENCOUNTER — TELEPHONE (OUTPATIENT)
Dept: GASTROENTEROLOGY | Facility: CLINIC | Age: 48
End: 2023-03-10
Payer: COMMERCIAL

## 2023-03-10 NOTE — TELEPHONE ENCOUNTER
Caller: Rik  Reason for Reschedule/Cancellation (please be detailed, any staff messages or encounters to note?): Patient out of town      Prior to reschedule please review:    Ordering Provider:Robert    Sedation per order:CS    Does patient have any ASC Exclusions, please identify?: N      Notes on Cancelled Procedure:    Procedure:Lower Endoscopy [Colonoscopy]     Date: 3/24/23    Location:[facility]; [address], [place] [address]    Surgeon: Maria Teresa        Rescheduled: Yes    Procedure: Lower Endoscopy [Colonoscopy]     Date: 5/19/23    Location:[facility]; [address], [place] [address]    Surgeon: Maria Teresa    Sedation Level Scheduled  CS,  Reason for Sedation Level Protocol    Prep/Instructions updated and sent: Yes

## 2023-05-02 RX ORDER — BISACODYL 5 MG/1
TABLET, DELAYED RELEASE ORAL
Qty: 4 TABLET | Refills: 0 | Status: SHIPPED | OUTPATIENT
Start: 2023-05-02 | End: 2023-05-19

## 2023-05-02 NOTE — TELEPHONE ENCOUNTER
Rescheduled colonoscopy.    Pre assessment questions completed for upcoming Colonoscopy  procedure scheduled on 5.19.2023    COVID policy reviewed.     Pre-op exam? N/A    Reviewed procedural arrival time 0645, procedure time 0730 and facility location [facility]; [address], [place] [address]    Designated  policy reviewed. Instructed to have someone stay 6 hours post procedure.     Anticoagulation/blood thinners? No    Electronic implanted devices? No    Diabetic? No    Procedure indication: screening colonoscopy    Bowel prep recommendation: Standard Golytely d/t mg citrate recall    Reviewed procedure prep instructions.     Prep instructions sent via Cinnamon.  Bowel prep script sent to [id number] - [facility] [address] [place] [address] [facility].     Patient verbalized understanding and had no questions or concerns at this time.    Jennifer Casey RN  Endoscopy Procedure Pre Assessment RN

## 2023-05-19 ENCOUNTER — HOSPITAL ENCOUNTER (OUTPATIENT)
Facility: AMBULATORY SURGERY CENTER | Age: 48
Discharge: HOME | End: 2023-05-19
Attending: SURGERY | Admitting: SURGERY
Payer: COMMERCIAL

## 2023-05-19 VITALS
SYSTOLIC BLOOD PRESSURE: 146 MMHG | RESPIRATION RATE: 16 BRPM | HEART RATE: 80 BPM | OXYGEN SATURATION: 98 % | TEMPERATURE: 97.6 F | DIASTOLIC BLOOD PRESSURE: 103 MMHG

## 2023-05-19 LAB — COLONOSCOPY: NORMAL

## 2023-05-19 PROCEDURE — G0105 COLORECTAL SCRN; HI RISK IND: HCPCS | Performed by: SURGERY

## 2023-05-19 PROCEDURE — 45378 DIAGNOSTIC COLONOSCOPY: CPT

## 2023-05-19 PROCEDURE — G8907 PT DOC NO EVENTS ON DISCHARG: HCPCS

## 2023-05-19 PROCEDURE — G8918 PT W/O PREOP ORDER IV AB PRO: HCPCS

## 2023-05-19 RX ORDER — NALOXONE HYDROCHLORIDE 0.4 MG/ML
0.2 INJECTION, SOLUTION INTRAMUSCULAR; INTRAVENOUS; SUBCUTANEOUS
Status: DISCONTINUED | OUTPATIENT
Start: 2023-05-19 | End: 2023-05-20 | Stop reason: HOSPADM

## 2023-05-19 RX ORDER — LIDOCAINE 40 MG/G
CREAM TOPICAL
Status: DISCONTINUED | OUTPATIENT
Start: 2023-05-19 | End: 2023-05-20 | Stop reason: HOSPADM

## 2023-05-19 RX ORDER — NALOXONE HYDROCHLORIDE 0.4 MG/ML
0.4 INJECTION, SOLUTION INTRAMUSCULAR; INTRAVENOUS; SUBCUTANEOUS
Status: DISCONTINUED | OUTPATIENT
Start: 2023-05-19 | End: 2023-05-20 | Stop reason: HOSPADM

## 2023-05-19 RX ORDER — ONDANSETRON 2 MG/ML
4 INJECTION INTRAMUSCULAR; INTRAVENOUS EVERY 6 HOURS PRN
Status: DISCONTINUED | OUTPATIENT
Start: 2023-05-19 | End: 2023-05-20 | Stop reason: HOSPADM

## 2023-05-19 RX ORDER — ONDANSETRON 4 MG/1
4 TABLET, ORALLY DISINTEGRATING ORAL EVERY 6 HOURS PRN
Status: DISCONTINUED | OUTPATIENT
Start: 2023-05-19 | End: 2023-05-20 | Stop reason: HOSPADM

## 2023-05-19 RX ORDER — FLUMAZENIL 0.1 MG/ML
0.2 INJECTION, SOLUTION INTRAVENOUS
Status: ACTIVE | OUTPATIENT
Start: 2023-05-19 | End: 2023-05-19

## 2023-05-19 RX ORDER — ONDANSETRON 2 MG/ML
4 INJECTION INTRAMUSCULAR; INTRAVENOUS
Status: DISCONTINUED | OUTPATIENT
Start: 2023-05-19 | End: 2023-05-20 | Stop reason: HOSPADM

## 2023-05-19 RX ORDER — FENTANYL CITRATE 50 UG/ML
INJECTION, SOLUTION INTRAMUSCULAR; INTRAVENOUS PRN
Status: DISCONTINUED | OUTPATIENT
Start: 2023-05-19 | End: 2023-05-19 | Stop reason: HOSPADM

## 2023-05-19 RX ORDER — PROCHLORPERAZINE MALEATE 10 MG
10 TABLET ORAL EVERY 6 HOURS PRN
Status: DISCONTINUED | OUTPATIENT
Start: 2023-05-19 | End: 2023-05-20 | Stop reason: HOSPADM

## 2023-05-19 NOTE — H&P
Pre-Endoscopy History and Physical     Rik PEÑA Given MRN# [id number]   YOB: 1975 Age: 47 year old     Date of Procedure: 5/19/2023  Primary care provider: Darrell Jones  Type of Endoscopy: colonoscopy  Reason for Procedure: screening  Type of Anesthesia Anticipated: Moderate Sedation    HPI:    Rik is a 47 year old male who will be undergoing the above procedure.      A history and physical has been performed. The patient's medications and allergies have been reviewed. The risks and benefits of the procedure and the sedation options and risks were discussed with the patient.  All questions were answered and informed consent was obtained.      He denies a personal or family history of anesthesia complications or bleeding disorders.     No Known Allergies     Cannot display prior to admission medications because the patient has not been admitted in this contact.     Current Outpatient Medications   Medication     ALPRAZolam (XANAX) 0.25 MG tablet     amLODIPine (NORVASC) 5 MG tablet     atorvastatin (LIPITOR) 20 MG tablet     fish oil-omega-3 fatty acids 1000 MG capsule     Loratadine (CLARITIN PO)     losartan-hydrochlorothiazide (HYZAAR) 100-12.5 MG tablet     metoprolol succinate ER (TOPROL XL) 100 MG 24 hr tablet     Current Facility-Administered Medications   Medication     lidocaine (LMX4) kit     lidocaine 1 % 0.1-1 mL     ondansetron (ZOFRAN) injection 4 mg     sodium chloride (PF) 0.9% PF flush 3 mL     sodium chloride (PF) 0.9% PF flush 3 mL         Patient Active Problem List   Diagnosis     Headache     Cervicalgia     Benign essential hypertension     Chronic bilateral low back pain with bilateral sciatica     Finger joint stiff, right     Class 1 obesity due to excess calories without serious comorbidity with body mass index (BMI) of 33.0 to 33.9 in adult        Past Medical History:   Diagnosis Date     Hypertension         Past Surgical History:   Procedure Laterality Date      "APPENDECTOMY         Social History     Tobacco Use     Smoking status: Former     Types: Cigarettes     Smokeless tobacco: Never   Vaping Use     Vaping status: Not on file   Substance Use Topics     Alcohol use: Yes     Comment: Occ       Family History   Problem Relation Age of Onset     Other Cancer Mother      Coronary Artery Disease Father         age 53     Diabetes Father        REVIEW OF SYSTEMS:     5 point ROS negative except as noted above in HPI, including Gen., Resp., CV, GI &  system review.      PHYSICAL EXAM:   BP (!) 150/98   Pulse 85   Temp 97.6  F (36.4  C) (Temporal)   Resp 16   SpO2 97%  Estimated body mass index is 33.29 kg/m  as calculated from the following:    Height as of 1/25/22: 1.753 m (5' 9\").    Weight as of 10/7/22: 102.2 kg (225 lb 6.4 oz).   GENERAL APPEARANCE: healthy, alert and no distress  MENTAL STATUS: alert  AIRWAY EXAM: Mallampatti Class III (visualization of the soft palate and base of uvula)  RESP: lungs clear to auscultation - no rales, rhonchi or wheezes  CV: regular rates and rhythm      DIAGNOSTICS:    Not indicated      IMPRESSION   ASA Class 2 - Mild systemic disease        PLAN:       Plan for colonoscopy. We discussed the risks, benefits and alternatives and the patient wished to proceed.    The above has been forwarded to the consulting provider.      Signed Electronically by: Sheldon Morel MD  May 19, 2023    "

## 2023-06-02 ENCOUNTER — HEALTH MAINTENANCE LETTER (OUTPATIENT)
Age: 48
End: 2023-06-02

## 2023-07-07 ENCOUNTER — TELEPHONE (OUTPATIENT)
Dept: CARDIOLOGY | Facility: CLINIC | Age: 48
End: 2023-07-07

## 2023-07-07 ENCOUNTER — OFFICE VISIT (OUTPATIENT)
Dept: FAMILY MEDICINE | Facility: CLINIC | Age: 48
End: 2023-07-07
Payer: COMMERCIAL

## 2023-07-07 VITALS
HEART RATE: 80 BPM | WEIGHT: 213 LBS | DIASTOLIC BLOOD PRESSURE: 112 MMHG | TEMPERATURE: 97.5 F | OXYGEN SATURATION: 96 % | HEIGHT: 69 IN | RESPIRATION RATE: 22 BRPM | BODY MASS INDEX: 31.55 KG/M2 | SYSTOLIC BLOOD PRESSURE: 172 MMHG

## 2023-07-07 DIAGNOSIS — Z82.49 FAMILY HISTORY OF ISCHEMIC HEART DISEASE: ICD-10-CM

## 2023-07-07 DIAGNOSIS — I10 BENIGN ESSENTIAL HYPERTENSION: ICD-10-CM

## 2023-07-07 DIAGNOSIS — Z13.1 SCREENING FOR DIABETES MELLITUS: ICD-10-CM

## 2023-07-07 DIAGNOSIS — E78.5 DYSLIPIDEMIA: ICD-10-CM

## 2023-07-07 DIAGNOSIS — E78.5 DYSLIPIDEMIA: Primary | ICD-10-CM

## 2023-07-07 DIAGNOSIS — R07.9 CHEST PAIN IN ADULT: Primary | ICD-10-CM

## 2023-07-07 LAB
ANION GAP SERPL CALCULATED.3IONS-SCNC: 13 MMOL/L (ref 7–15)
BUN SERPL-MCNC: 14.5 MG/DL (ref 6–20)
CALCIUM SERPL-MCNC: 9.7 MG/DL (ref 8.6–10)
CHLORIDE SERPL-SCNC: 103 MMOL/L (ref 98–107)
CHOLEST SERPL-MCNC: 275 MG/DL
CREAT SERPL-MCNC: 0.75 MG/DL (ref 0.67–1.17)
DEPRECATED HCO3 PLAS-SCNC: 23 MMOL/L (ref 22–29)
GFR SERPL CREATININE-BSD FRML MDRD: >90 ML/MIN/1.73M2
GLUCOSE SERPL-MCNC: 111 MG/DL (ref 70–99)
HDLC SERPL-MCNC: 44 MG/DL
LDLC SERPL CALC-MCNC: 199 MG/DL
NONHDLC SERPL-MCNC: 231 MG/DL
POTASSIUM SERPL-SCNC: 3.5 MMOL/L (ref 3.4–5.3)
SODIUM SERPL-SCNC: 139 MMOL/L (ref 136–145)
TRIGL SERPL-MCNC: 161 MG/DL

## 2023-07-07 PROCEDURE — 80048 BASIC METABOLIC PNL TOTAL CA: CPT | Performed by: NURSE PRACTITIONER

## 2023-07-07 PROCEDURE — 80061 LIPID PANEL: CPT | Performed by: NURSE PRACTITIONER

## 2023-07-07 PROCEDURE — 93000 ELECTROCARDIOGRAM COMPLETE: CPT | Performed by: NURSE PRACTITIONER

## 2023-07-07 PROCEDURE — 36415 COLL VENOUS BLD VENIPUNCTURE: CPT | Performed by: NURSE PRACTITIONER

## 2023-07-07 PROCEDURE — 99214 OFFICE O/P EST MOD 30 MIN: CPT | Performed by: NURSE PRACTITIONER

## 2023-07-07 RX ORDER — METOPROLOL SUCCINATE 100 MG/1
100 TABLET, EXTENDED RELEASE ORAL DAILY
Qty: 90 TABLET | Refills: 1 | Status: SHIPPED | OUTPATIENT
Start: 2023-07-07

## 2023-07-07 RX ORDER — ATORVASTATIN CALCIUM 40 MG/1
40 TABLET, FILM COATED ORAL DAILY
Qty: 90 TABLET | Refills: 3 | Status: SHIPPED | OUTPATIENT
Start: 2023-07-07 | End: 2024-01-05

## 2023-07-07 RX ORDER — AMLODIPINE BESYLATE 5 MG/1
5 TABLET ORAL DAILY
Qty: 90 TABLET | Refills: 1 | Status: SHIPPED | OUTPATIENT
Start: 2023-07-07 | End: 2024-01-05

## 2023-07-07 RX ORDER — LOSARTAN POTASSIUM AND HYDROCHLOROTHIAZIDE 12.5; 1 MG/1; MG/1
1 TABLET ORAL DAILY
Qty: 90 TABLET | Refills: 1 | Status: SHIPPED | OUTPATIENT
Start: 2023-07-07 | End: 2024-01-05

## 2023-07-07 ASSESSMENT — ANXIETY QUESTIONNAIRES
IF YOU CHECKED OFF ANY PROBLEMS ON THIS QUESTIONNAIRE, HOW DIFFICULT HAVE THESE PROBLEMS MADE IT FOR YOU TO DO YOUR WORK, TAKE CARE OF THINGS AT HOME, OR GET ALONG WITH OTHER PEOPLE: SOMEWHAT DIFFICULT
3. WORRYING TOO MUCH ABOUT DIFFERENT THINGS: SEVERAL DAYS
5. BEING SO RESTLESS THAT IT IS HARD TO SIT STILL: NOT AT ALL
1. FEELING NERVOUS, ANXIOUS, OR ON EDGE: MORE THAN HALF THE DAYS
GAD7 TOTAL SCORE: 6
GAD7 TOTAL SCORE: 6
2. NOT BEING ABLE TO STOP OR CONTROL WORRYING: SEVERAL DAYS
6. BECOMING EASILY ANNOYED OR IRRITABLE: SEVERAL DAYS
7. FEELING AFRAID AS IF SOMETHING AWFUL MIGHT HAPPEN: SEVERAL DAYS
4. TROUBLE RELAXING: NOT AT ALL

## 2023-07-07 ASSESSMENT — PAIN SCALES - GENERAL: PAINLEVEL: NO PAIN (0)

## 2023-07-07 NOTE — TELEPHONE ENCOUNTER
Patient states he received a phone call back to schedule. Do not see any notes as to where/when we are scheduling. Please call the patient back

## 2023-07-07 NOTE — TELEPHONE ENCOUNTER
[facility] Call Center    Phone Message    May a detailed message be left on voicemail: yes     Reason for Call: Appointment Intake    Referring Provider Name: Jamaica Isaac CNP  Diagnosis and/or Symptoms: Chest pain in adult [R07.9]; Family history of ischemic heart disease [Z82.49]. No available appointments in 1-2 weeks. Please reach out to patient to schedule.     Action Taken: Message routed to:  [facility] Cardio    Travel Screening: Not Applicable

## 2023-07-07 NOTE — PATIENT INSTRUCTIONS
Restart all 3 blood pressure medications.  Take the metoprolol at night and the other 2 medications in the morning. Please try hard to take every day and don't stop medication without seeing us.    I would recommend restarting atorvastatin to reduce your risk of heart attack and stroke.   Recommend fully weaning off the e-cig, as nicotine causes narrowing of your blood vessels.   Referral to cardiology for work-up of your chest pain.   If everything checks out, consider treating anxiety.   Go to ER if you develop CP or other concerning symptoms again.

## 2023-07-07 NOTE — PROGRESS NOTES
"  Assessment & Plan     Chest pain in adult / Family history of ischemic heart disease  He has uncontrolled HTN and also quit taking statin.  Uses nicotine. Family hx of ischemic heart disease in father in his 50s.   Today EKG looks similar to previous, no acute ischemic changes.   I'd like him to see cardiology for input on what testing would be recommended.  He had a normal stress echo in 2020.    Referral placed.    Instructed to go to ER with recurrent CP episode.   Restart BP meds.  Would recommend he restart statin, but he is not interested at this time.  Recommend stopping nicotine products completely.   - EKG 12-lead complete w/read - Clinics  - Adult Cardiology [facility] Referral; Future    Benign essential hypertension  Uncontrolled. Has not taken meds consistently, currently has been out for a few months.   Restart meds.    Discussed risks of uncontrolled HTN, including heart attack and stroke.   - losartan-hydrochlorothiazide (HYZAAR) 100-12.5 MG tablet; Take 1 tablet by mouth daily  - amLODIPine (NORVASC) 5 MG tablet; Take 1 tablet (5 mg) by mouth daily  - metoprolol succinate ER (TOPROL XL) 100 MG 24 hr tablet; Take 1 tablet (100 mg) by mouth daily  - Basic metabolic panel  (Ca, Cl, CO2, Creat, Gluc, K, Na, BUN); Future    Dyslipidemia  Lipid panel in process.  LDL has been as high as 188 in the past.  Last year it was 100, unclear if he was taking atorvastatin at that time or not.  He states he quit taking it a long time ago because it is bad for him.    - Lipid panel reflex to direct LDL Fasting; Future    Screening for diabetes mellitus  BMP in process.    - Basic metabolic panel  (Ca, Cl, CO2, Creat, Gluc, K, Na, BUN); Future         BMI:   Estimated body mass index is 31.68 kg/m  as calculated from the following:    Height as of this encounter: 1.746 m (5' 8.75\").    Weight as of this encounter: 96.6 kg (213 lb).   Weight management plan: not discussed today        Jamaica Isaac, CNP  [facility] " [facility]    Jacob Jolly is a 47 year old, presenting for the following health issues:  Hypertension and Follow Up (Possible anxiety)        7/7/2023     7:15 AM   Additional Questions   Roomed by erin   Accompanied by self         7/7/2023     7:15 AM   Patient Reported Additional Medications   Patient reports taking the following new medications see chart     HPI       Worried about his heart. He is wondering if it is his heart or anxiety.   States he was [place] from the cabin on the 4th of July, had been driving for about an hour.  Started having a squeezing sensation in his chest, started tingling in his arms and face.   States his lip started drooping.  This lasted for about 15 minutes.  Then he took a xanax when he got home, felt better after that. Reports that he always has tightness in his chest.    Has an e-cig, nicotine.  On the lowest dose.  Smoked cigarettes in the past.   Hx of HLD and HTN.  He quit taking statin a long time ago because he read online that it was bad for you.   Has not been compliant with blood pressure medications.  He states they do not work.  For his last several visits he has either stopped taking them or ran out of them prior to visit.  Today he states he hasn't been taking meds for months.  Tried to control BP naturally. Changed his diet and lost 18 lbs.  BP still high today, 172/112.    Dad had CABG in his 50s and had MI prior to that.    Now he has a pacemaker.          Review of Systems   Constitutional, HEENT, cardiovascular, pulmonary, gi and gu systems are negative, except as otherwise noted.      Objective    BP (!) 172/112   Pulse 80   Temp 97.5  F (36.4  C) (Oral)   Resp 22   Ht 1.746 m (5' 8.75\")   Wt 96.6 kg (213 lb)   SpO2 96%   BMI 31.68 kg/m    Body mass index is 31.68 kg/m .  Physical Exam   GENERAL: healthy, alert and no distress  EYES: Eyes grossly normal to inspection, PERRL and conjunctivae and sclerae normal  RESP: " lungs clear to auscultation - no rales, rhonchi or wheezes  CV: regular rate and rhythm, normal S1 S2, no S3 or S4, no murmur, click or rub, no peripheral edema and peripheral pulses strong  MS: no gross musculoskeletal defects noted, no edema  SKIN: no suspicious lesions or rashes  NEURO: Normal strength and tone, mentation intact and speech normal  PSYCH: mentation appears normal, affect normal/bright    Labs in process. Previous labs and imaging reviewed in Epic.

## 2023-07-10 ENCOUNTER — TELEPHONE (OUTPATIENT)
Dept: CARDIOLOGY | Facility: CLINIC | Age: 48
End: 2023-07-10
Payer: COMMERCIAL

## 2023-07-27 ENCOUNTER — OFFICE VISIT (OUTPATIENT)
Dept: CARDIOLOGY | Facility: CLINIC | Age: 48
End: 2023-07-27
Attending: NURSE PRACTITIONER
Payer: COMMERCIAL

## 2023-07-27 VITALS
HEART RATE: 80 BPM | OXYGEN SATURATION: 97 % | SYSTOLIC BLOOD PRESSURE: 129 MMHG | BODY MASS INDEX: 31.83 KG/M2 | WEIGHT: 214 LBS | DIASTOLIC BLOOD PRESSURE: 91 MMHG

## 2023-07-27 DIAGNOSIS — I10 BENIGN ESSENTIAL HYPERTENSION: ICD-10-CM

## 2023-07-27 DIAGNOSIS — Z82.49 FAMILY HISTORY OF ISCHEMIC HEART DISEASE: ICD-10-CM

## 2023-07-27 DIAGNOSIS — R07.9 CHEST PAIN IN ADULT: ICD-10-CM

## 2023-07-27 DIAGNOSIS — E78.5 HYPERLIPIDEMIA LDL GOAL <70: Primary | ICD-10-CM

## 2023-07-27 PROCEDURE — 99204 OFFICE O/P NEW MOD 45 MIN: CPT | Performed by: INTERNAL MEDICINE

## 2023-07-27 RX ORDER — MULTIVIT,TX WITH IRON,MINERALS
500 TABLET, EXTENDED RELEASE ORAL 2 TIMES DAILY
COMMUNITY

## 2023-07-27 RX ORDER — CHOLECALCIFEROL (VITAMIN D3) 50 MCG
1 TABLET ORAL DAILY
COMMUNITY

## 2023-07-27 NOTE — PROGRESS NOTES
SUBJECTIVE:  Rik Lyons is a 47 year old male who presents for evaluation of chest tightness.  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  Do heavy work at times and no cardiac symptoms during activity.  Recently patient was driving.  He describes that he was getting anxious, started breathing heavily, had chest tightness and tingling numbness of both extremities and also drooping of his lips.  Symptoms resolved in 20 minutes.  He did have another episode of panic attack in the past.  Patient's cardiac risk factors of prior history of smoking, currently smoking e-cigarettes, hypertension and hyperlipidemia as well as premature coronary artery disease.  His father had MI and quadruple bypass surgery at the age of 40.  His brother is asymptomatic and no history of CAD.  After hearing the bad effects of statin he stopped his Lipitor and his LDL went up to 199.  Currently patient is back on Lipitor 80 mg daily.  Currently patient is on a diet exercise weight loss program.  Patient Active Problem List    Diagnosis Date Noted    Finger joint stiff, right 11/05/2019     Priority: Medium    Benign essential hypertension 05/07/2018     Priority: Medium    Chronic bilateral low back pain with bilateral sciatica 05/07/2018     Priority: Medium    Headache 06/01/2015     Priority: Medium    Cervicalgia 06/01/2015     Priority: Medium    Class 1 obesity due to excess calories without serious comorbidity with body mass index (BMI) of 33.0 to 33.9 in adult 08/12/2021     Priority: Low    .  Current Outpatient Medications   Medication Sig    amLODIPine (NORVASC) 5 MG tablet Take 1 tablet (5 mg) by mouth daily    atorvastatin (LIPITOR) 40 MG tablet Take 1 tablet (40 mg) by mouth daily    losartan-hydrochlorothiazide (HYZAAR) 100-12.5 MG tablet Take 1 tablet by mouth daily    magnesium gluconate (MAGONATE) 250 MG tablet Take 500 mg by mouth 2 times daily    metoprolol succinate ER (TOPROL XL) 100 MG 24 hr tablet Take  1 tablet (100 mg) by mouth daily    vitamin D3 (CHOLECALCIFEROL) 50 mcg (2000 units) tablet Take 1 tablet by mouth daily    ALPRAZolam (XANAX) 0.25 MG tablet 1-2 tab daily as needed for panic attacks/anxiety or sleep. Avoid with driving or ALCOHOL. (Patient not taking: Reported on 7/27/2023)     No current facility-administered medications for this visit.     Past Medical History:   Diagnosis Date    Hypertension      Past Surgical History:   Procedure Laterality Date    APPENDECTOMY      COLONOSCOPY WITH CO2 INSUFFLATION N/A 5/19/2023    Procedure: COLONOSCOPY, WITH CO2 INSUFFLATION;  Surgeon: Sheldon Morel MD;  Location: [facility] OR     No Known Allergies  Social History     Socioeconomic History    Marital status:      Spouse name: Not on file    Number of children: Not on file    Years of education: Not on file    Highest education level: Not on file   Occupational History    Not on file   Tobacco Use    Smoking status: Former     Types: Cigarettes    Smokeless tobacco: Never   Vaping Use    Vaping Use: Never used   Substance and Sexual Activity    Alcohol use: Yes     Comment: Occ    Drug use: No    Sexual activity: Yes     Partners: Female     Birth control/protection: None   Other Topics Concern    Parent/sibling w/ CABG, MI or angioplasty before 65F 55M? No   Social History Narrative    Not on file     Social Determinants of Health     Financial Resource Strain: Not on file   Food Insecurity: Not on file   Transportation Needs: Not on file   Physical Activity: Not on file   Stress: Not on file   Social Connections: Not on file   Intimate Partner Violence: Not on file   Housing Stability: Not on file     Family History   Problem Relation Age of Onset    Other Cancer Mother     Coronary Artery Disease Father         age 53    Diabetes Father           REVIEW OF SYSTEMS:  General: negative, fever, chills, night sweats  Skin: negative, acne, rash, and scaling  Eyes: negative, double vision, eye pain,  and photophobia  Ears/Nose/Throat: negative, nasal congestion, and purulent rhinorrhea  Respiratory: No shortness of breath, dyspnea on exertion, cough, or hemoptysis, No shortness of breath, No dyspnea on exertion, No cough, and negative  Cardiovascular: negative, palpitations, tachycardia, irregular heart beat, exertional chest pain or pressure, paroxysmal nocturnal dyspnea, dyspnea on exertion, and orthopnea       OBJECTIVE:  Blood pressure (!) 129/91, pulse 80, weight 97.1 kg (214 lb), SpO2 97 %.  General Appearance: healthy, alert, active, and no distress  Head: Normocephalic. No masses, lesions, tenderness or abnormalities  Eyes: conjuctiva clear, PERRL, EOM intact  Ears: External ears normal. Canals clear. TM's normal.  Nose: Nares normal  Mouth: normal  Neck: Supple, no cervical adenopathy, no thyromegaly  Lungs: clear to auscultation  Cardiac: regular rate and rhythm, normal S1 and S2, no murmur       ASSESSMENT/PLAN:  Patient here for cardiac evaluation due to concern of coronary artery disease.  He is fairly active with no exertional symptoms.  Recently he had an episode of chest tightness rapid breathing and tingling and numbness of both extremities and lips while driving.  This subsided in 20 minutes.  He think it is a panic attack.  He has no prior cardiac history.  Cardiac risk factors of prior smoking, currently on is great for past 11 years, hypertension and hyperlipidemia and premature coronary artery disease in family.  No diabetes.  Patient was on statin before but this was discontinued due to the side effects he came to know about.  His last LDL was 199 and he is back on Lipitor 80 mg daily.  Current cardiac medications are losartan hydrochlorothiazide combination at 100/12.5 mg daily amlodipine 5 mg daily, Toprol- mg daily and atorvastatin 40 mg daily.  Patient's EKG is reviewed.  Normal sinus rhythm normal EKG.  In 2020 patient had an exercise stress echo.  This was completely normal at  adequate workload and heart rate.  Discussed very atypical nature of his symptoms.  Also suggested low probability of obstructive CAD.  Patient states that he is getting very anxious about his heart.  Because of this he is getting panic attacks and significant anxiety.  As he had a normal exercise stress echo no reason to repeat another 1.  Discussed options and patient is interested in proceeding with a coronary CT angiogram.  We will arrange for a CT coronary angiogram and patient will be contacted with the test results and further plan.  Meanwhile encouraged diet exercise and weight loss, continuing current medications and quitting smoking.  Total visit duration 45 minutes.  This included face-to-face interview, physical exam, chart review, review of EKGs stress echo and documentation.

## 2023-07-27 NOTE — LETTER
7/27/2023      RE: Rik Lyons  [address] [place] [facility] [address]       Dear Colleague,    Thank you for the opportunity to participate in the care of your patient, Rik Lyons, at the [facility]. Please see a copy of my visit note below.       SUBJECTIVE:  Rik Lyons is a 47 year old male who presents for evaluation of chest tightness.  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  Do heavy work at times and no cardiac symptoms during activity.  Recently patient was driving.  He describes that he was getting anxious, started breathing heavily, had chest tightness and tingling numbness of both extremities and also drooping of his lips.  Symptoms resolved in 20 minutes.  He did have another episode of panic attack in the past.  Patient's cardiac risk factors of prior history of smoking, currently smoking e-cigarettes, hypertension and hyperlipidemia as well as premature coronary artery disease.  His father had MI and quadruple bypass surgery at the age of 40.  His brother is asymptomatic and no history of CAD.  After hearing the bad effects of statin he stopped his Lipitor and his LDL went up to 199.  Currently patient is back on Lipitor 80 mg daily.  Currently patient is on a diet exercise weight loss program.  Patient Active Problem List    Diagnosis Date Noted    Finger joint stiff, right 11/05/2019     Priority: Medium    Benign essential hypertension 05/07/2018     Priority: Medium    Chronic bilateral low back pain with bilateral sciatica 05/07/2018     Priority: Medium    Headache 06/01/2015     Priority: Medium    Cervicalgia 06/01/2015     Priority: Medium    Class 1 obesity due to excess calories without serious comorbidity with body mass index (BMI) of 33.0 to 33.9 in adult 08/12/2021     Priority: Low    .  Current Outpatient Medications   Medication Sig    amLODIPine (NORVASC) 5 MG tablet Take  1 tablet (5 mg) by mouth daily    atorvastatin (LIPITOR) 40 MG tablet Take 1 tablet (40 mg) by mouth daily    losartan-hydrochlorothiazide (HYZAAR) 100-12.5 MG tablet Take 1 tablet by mouth daily    magnesium gluconate (MAGONATE) 250 MG tablet Take 500 mg by mouth 2 times daily    metoprolol succinate ER (TOPROL XL) 100 MG 24 hr tablet Take 1 tablet (100 mg) by mouth daily    vitamin D3 (CHOLECALCIFEROL) 50 mcg (2000 units) tablet Take 1 tablet by mouth daily    ALPRAZolam (XANAX) 0.25 MG tablet 1-2 tab daily as needed for panic attacks/anxiety or sleep. Avoid with driving or ALCOHOL. (Patient not taking: Reported on 7/27/2023)     No current facility-administered medications for this visit.     Past Medical History:   Diagnosis Date    Hypertension      Past Surgical History:   Procedure Laterality Date    APPENDECTOMY      COLONOSCOPY WITH CO2 INSUFFLATION N/A 5/19/2023    Procedure: COLONOSCOPY, WITH CO2 INSUFFLATION;  Surgeon: Sheldon Morel MD;  Location:  OR     No Known Allergies  Social History     Socioeconomic History    Marital status:      Spouse name: Not on file    Number of children: Not on file    Years of education: Not on file    Highest education level: Not on file   Occupational History    Not on file   Tobacco Use    Smoking status: Former     Types: Cigarettes    Smokeless tobacco: Never   Vaping Use    Vaping Use: Never used   Substance and Sexual Activity    Alcohol use: Yes     Comment: Occ    Drug use: No    Sexual activity: Yes     Partners: Female     Birth control/protection: None   Other Topics Concern    Parent/sibling w/ CABG, MI or angioplasty before 65F 55M? No   Social History Narrative    Not on file     Social Determinants of Health     Financial Resource Strain: Not on file   Food Insecurity: Not on file   Transportation Needs: Not on file   Physical Activity: Not on file   Stress: Not on file   Social Connections: Not on file   Intimate Partner Violence: Not on  file   Housing Stability: Not on file     Family History   Problem Relation Age of Onset    Other Cancer Mother     Coronary Artery Disease Father         age 53    Diabetes Father           REVIEW OF SYSTEMS:  General: negative, fever, chills, night sweats  Skin: negative, acne, rash, and scaling  Eyes: negative, double vision, eye pain, and photophobia  Ears/Nose/Throat: negative, nasal congestion, and purulent rhinorrhea  Respiratory: No shortness of breath, dyspnea on exertion, cough, or hemoptysis, No shortness of breath, No dyspnea on exertion, No cough, and negative  Cardiovascular: negative, palpitations, tachycardia, irregular heart beat, exertional chest pain or pressure, paroxysmal nocturnal dyspnea, dyspnea on exertion, and orthopnea       OBJECTIVE:  Blood pressure (!) 129/91, pulse 80, weight 97.1 kg (214 lb), SpO2 97 %.  General Appearance: healthy, alert, active, and no distress  Head: Normocephalic. No masses, lesions, tenderness or abnormalities  Eyes: conjuctiva clear, PERRL, EOM intact  Ears: External ears normal. Canals clear. TM's normal.  Nose: Nares normal  Mouth: normal  Neck: Supple, no cervical adenopathy, no thyromegaly  Lungs: clear to auscultation  Cardiac: regular rate and rhythm, normal S1 and S2, no murmur       ASSESSMENT/PLAN:  Patient here for cardiac evaluation due to concern of coronary artery disease.  He is fairly active with no exertional symptoms.  Recently he had an episode of chest tightness rapid breathing and tingling and numbness of both extremities and lips while driving.  This subsided in 20 minutes.  He think it is a panic attack.  He has no prior cardiac history.  Cardiac risk factors of prior smoking, currently on is great for past 11 years, hypertension and hyperlipidemia and premature coronary artery disease in family.  No diabetes.  Patient was on statin before but this was discontinued due to the side effects he came to know about.  His last LDL was 199 and he  is back on Lipitor 80 mg daily.  Current cardiac medications are losartan hydrochlorothiazide combination at 100/12.5 mg daily amlodipine 5 mg daily, Toprol- mg daily and atorvastatin 40 mg daily.  Patient's EKG is reviewed.  Normal sinus rhythm normal EKG.  In 2020 patient had an exercise stress echo.  This was completely normal at adequate workload and heart rate.  Discussed very atypical nature of his symptoms.  Also suggested low probability of obstructive CAD.  Patient states that he is getting very anxious about his heart.  Because of this he is getting panic attacks and significant anxiety.  As he had a normal exercise stress echo no reason to repeat another 1.  Discussed options and patient is interested in proceeding with a coronary CT angiogram.  We will arrange for a CT coronary angiogram and patient will be contacted with the test results and further plan.  Meanwhile encouraged diet exercise and weight loss, continuing current medications and quitting smoking.  Total visit duration 45 minutes.  This included face-to-face interview, physical exam, chart review, review of EKGs stress echo and documentation.      Please do not hesitate to contact me if you have any questions/concerns.     Sincerely,     TYLER Baxter MD

## 2023-07-27 NOTE — NURSING NOTE
"Chief Complaint   Patient presents with    Chest tightness     New General Cardiology for Chest tightness. Family history of ischemic heart disease.        Initial BP (!) 129/91 (BP Location: Left arm, Patient Position: Chair, Cuff Size: Adult Large)   Pulse 80   Wt 97.1 kg (214 lb)   SpO2 97%   BMI 31.83 kg/m   Estimated body mass index is 31.83 kg/m  as calculated from the following:    Height as of 7/7/23: 1.746 m (5' 8.75\").    Weight as of this encounter: 97.1 kg (214 lb)..  BP completed using cuff size: CHANTE Freed    "

## 2023-07-27 NOTE — PATIENT INSTRUCTIONS
Thank you for coming to the [facility] @ [facility]; please note the following instructions:    1. Dr. DIVINA Londono has requested you to have a Cardiac CT Angiogram.  This has been scheduled at the [facility] on 8/21/23 at 12:00pm    Please follow these INSTRUCTIONS for PREP of your CT SCAN:    1.  PLEASE DO NOT USE ALCOHOL, CAFFEINE OR TOBACCO 12 HOURS PRIOR TO THE PROCEDURE          If you have any questions regarding your visit please contact your care team:     Cardiology  Telephone Number   Deanna QUINONES, RN  Mey FONSECA, RN   Laura GEORGE, RMCASEY AGUILAR, CHANTE TILLEY, Visit Facilitator  Cris SUÁREZ [facility] [phone] (option 1)   For scheduling appts:     [phone] (select option 1)       For the Device Clinic (Pacemakers and ICD's)  RN's :  Nancy Jaeger   During business hours: [phone]    *After business hours:  [phone] (select option 4)      Normal test result notifications will be released via [email or web address] or mailed within 7 business days.  All other test results, will be communicated via telephone once reviewed by your cardiologist.    If you need a medication refill please contact your pharmacy.  Please allow 3 business days for your refill to be completed.    As always, thank you for trusting us with your health care needs!

## 2023-08-21 ENCOUNTER — HOSPITAL ENCOUNTER (OUTPATIENT)
Dept: CT IMAGING | Facility: CLINIC | Age: 48
Discharge: HOME | End: 2023-08-21
Attending: INTERNAL MEDICINE | Admitting: INTERNAL MEDICINE
Payer: COMMERCIAL

## 2023-08-21 ENCOUNTER — ANCILLARY ORDERS (OUTPATIENT)
Dept: CARDIOLOGY | Facility: CLINIC | Age: 48
End: 2023-08-21

## 2023-08-21 VITALS — RESPIRATION RATE: 16 BRPM | DIASTOLIC BLOOD PRESSURE: 86 MMHG | SYSTOLIC BLOOD PRESSURE: 138 MMHG | HEART RATE: 58 BPM

## 2023-08-21 DIAGNOSIS — R07.9 CHEST PAIN IN ADULT: ICD-10-CM

## 2023-08-21 DIAGNOSIS — Z82.49 FAMILY HISTORY OF ISCHEMIC HEART DISEASE: ICD-10-CM

## 2023-08-21 DIAGNOSIS — I10 BENIGN ESSENTIAL HYPERTENSION: ICD-10-CM

## 2023-08-21 DIAGNOSIS — E78.5 HYPERLIPIDEMIA LDL GOAL <70: ICD-10-CM

## 2023-08-21 DIAGNOSIS — R07.9 CHEST PAIN IN ADULT: Primary | ICD-10-CM

## 2023-08-21 PROCEDURE — 75574 CT ANGIO HRT W/3D IMAGE: CPT | Mod: 26 | Performed by: INTERNAL MEDICINE

## 2023-08-21 PROCEDURE — 75574 CT ANGIO HRT W/3D IMAGE: CPT

## 2023-08-21 PROCEDURE — 250N000011 HC RX IP 250 OP 636: Performed by: INTERNAL MEDICINE

## 2023-08-21 PROCEDURE — 250N000013 HC RX MED GY IP 250 OP 250 PS 637: Performed by: INTERNAL MEDICINE

## 2023-08-21 RX ORDER — DIPHENHYDRAMINE HCL 25 MG
25 CAPSULE ORAL
Status: DISCONTINUED | OUTPATIENT
Start: 2023-08-21 | End: 2023-08-22 | Stop reason: HOSPADM

## 2023-08-21 RX ORDER — METOPROLOL TARTRATE 25 MG/1
25-100 TABLET, FILM COATED ORAL
Status: COMPLETED | OUTPATIENT
Start: 2023-08-21 | End: 2023-08-21

## 2023-08-21 RX ORDER — NITROGLYCERIN 0.4 MG/1
.4-.8 TABLET SUBLINGUAL
Status: DISCONTINUED | OUTPATIENT
Start: 2023-08-21 | End: 2023-08-22 | Stop reason: HOSPADM

## 2023-08-21 RX ORDER — IOPAMIDOL 755 MG/ML
120 INJECTION, SOLUTION INTRAVASCULAR ONCE
Status: COMPLETED | OUTPATIENT
Start: 2023-08-21 | End: 2023-08-21

## 2023-08-21 RX ORDER — METOPROLOL TARTRATE 1 MG/ML
5-15 INJECTION, SOLUTION INTRAVENOUS
Status: DISCONTINUED | OUTPATIENT
Start: 2023-08-21 | End: 2023-08-22 | Stop reason: HOSPADM

## 2023-08-21 RX ORDER — ONDANSETRON 2 MG/ML
4 INJECTION INTRAMUSCULAR; INTRAVENOUS
Status: DISCONTINUED | OUTPATIENT
Start: 2023-08-21 | End: 2023-08-22 | Stop reason: HOSPADM

## 2023-08-21 RX ORDER — IVABRADINE 5 MG/1
5-15 TABLET, FILM COATED ORAL
Status: DISCONTINUED | OUTPATIENT
Start: 2023-08-21 | End: 2023-08-22 | Stop reason: HOSPADM

## 2023-08-21 RX ORDER — ACYCLOVIR 200 MG/1
0-1 CAPSULE ORAL
Status: DISCONTINUED | OUTPATIENT
Start: 2023-08-21 | End: 2023-08-22 | Stop reason: HOSPADM

## 2023-08-21 RX ORDER — METHYLPREDNISOLONE SODIUM SUCCINATE 125 MG/2ML
125 INJECTION, POWDER, LYOPHILIZED, FOR SOLUTION INTRAMUSCULAR; INTRAVENOUS
Status: DISCONTINUED | OUTPATIENT
Start: 2023-08-21 | End: 2023-08-22 | Stop reason: HOSPADM

## 2023-08-21 RX ORDER — DIPHENHYDRAMINE HYDROCHLORIDE 50 MG/ML
25-50 INJECTION INTRAMUSCULAR; INTRAVENOUS
Status: DISCONTINUED | OUTPATIENT
Start: 2023-08-21 | End: 2023-08-22 | Stop reason: HOSPADM

## 2023-08-21 RX ADMIN — IOPAMIDOL 120 ML: 755 INJECTION, SOLUTION INTRAVENOUS at 12:52

## 2023-08-21 RX ADMIN — METOPROLOL TARTRATE 50 MG: 50 TABLET, FILM COATED ORAL at 12:04

## 2023-08-21 RX ADMIN — NITROGLYCERIN 0.8 MG: 0.4 TABLET SUBLINGUAL at 12:57

## 2023-08-21 NOTE — PROGRESS NOTES
Pt arrived for Coronary CT angiogram. Test, meds and side effects reviewed with pt. Resting HR 61 bpm. Given 50 mg PO Metoprolol per verbal order. Administered 0.8 mg SL nitro on CTA table per order. CTA completed. Patient tolerated procedure well and denies symptoms of allergic reaction. Post monitoring completed and VSS. D/C instructions reviewed with pt whom verbalized understanding of need to increase PO fluids today. D/C to gold waiting room accompanied by staff.

## 2023-08-22 ENCOUNTER — MYC MEDICAL ADVICE (OUTPATIENT)
Dept: CARDIOLOGY | Facility: CLINIC | Age: 48
End: 2023-08-22
Payer: COMMERCIAL

## 2023-08-22 NOTE — TELEPHONE ENCOUNTER
Radiologist Result Care Coordination      Result Notes     TYLER Baxter MD  8/22/2023  7:12 AM CDT Back to Top      Follow-up with PCP.         CT Coronary Artery Angio w Calcium Score   TYLER Baxter, MD  [facility]  Mild nonobstructive coronary artery disease.  Calcium score 268 placing him in the 97th percentile.  We will continue with risk factor modification.        [email or web address] message sent to patient.    Sandy Banuelos RN, BSN  Cardiology RN Care Coordinator   [place]   Phone: [phone]  Fax: [phone] ([place]) [phone] ([place])

## 2023-08-23 NOTE — TELEPHONE ENCOUNTER
Jordana message reviewed by patient.  Ok to close this encounter.    Deanna Duggan, RN  Cardiology Care Coordinator  [facility]  [phone] option 1

## 2024-01-05 ENCOUNTER — MYC REFILL (OUTPATIENT)
Dept: FAMILY MEDICINE | Facility: CLINIC | Age: 49
End: 2024-01-05
Payer: COMMERCIAL

## 2024-01-05 DIAGNOSIS — I10 BENIGN ESSENTIAL HYPERTENSION: ICD-10-CM

## 2024-01-05 DIAGNOSIS — E78.5 DYSLIPIDEMIA: ICD-10-CM

## 2024-01-05 DIAGNOSIS — F41.9 ANXIETY: ICD-10-CM

## 2024-01-05 RX ORDER — ATORVASTATIN CALCIUM 40 MG/1
40 TABLET, FILM COATED ORAL DAILY
Qty: 90 TABLET | Refills: 3 | Status: SHIPPED | OUTPATIENT
Start: 2024-01-05

## 2024-01-05 RX ORDER — AMLODIPINE BESYLATE 5 MG/1
5 TABLET ORAL DAILY
Qty: 90 TABLET | Refills: 1 | Status: SHIPPED | OUTPATIENT
Start: 2024-01-05 | End: 2024-06-25

## 2024-01-05 RX ORDER — LOSARTAN POTASSIUM AND HYDROCHLOROTHIAZIDE 12.5; 1 MG/1; MG/1
1 TABLET ORAL DAILY
Qty: 90 TABLET | Refills: 1 | Status: SHIPPED | OUTPATIENT
Start: 2024-01-05 | End: 2024-06-25

## 2024-01-05 RX ORDER — ALPRAZOLAM 0.25 MG
TABLET ORAL
Qty: 10 TABLET | Refills: 0 | OUTPATIENT
Start: 2024-01-05

## 2024-04-23 ENCOUNTER — TRANSFERRED RECORDS (OUTPATIENT)
Dept: HEALTH INFORMATION MANAGEMENT | Facility: CLINIC | Age: 49
End: 2024-04-23
Payer: COMMERCIAL

## 2024-06-24 DIAGNOSIS — I10 BENIGN ESSENTIAL HYPERTENSION: ICD-10-CM

## 2024-06-25 RX ORDER — LOSARTAN POTASSIUM AND HYDROCHLOROTHIAZIDE 12.5; 1 MG/1; MG/1
1 TABLET ORAL DAILY
Qty: 90 TABLET | Refills: 0 | Status: SHIPPED | OUTPATIENT
Start: 2024-06-25 | End: 2024-10-02

## 2024-06-25 RX ORDER — AMLODIPINE BESYLATE 5 MG/1
5 TABLET ORAL DAILY
Qty: 90 TABLET | Refills: 0 | Status: SHIPPED | OUTPATIENT
Start: 2024-06-25 | End: 2024-10-02

## 2024-06-30 ENCOUNTER — HEALTH MAINTENANCE LETTER (OUTPATIENT)
Age: 49
End: 2024-06-30

## 2024-10-02 DIAGNOSIS — I10 BENIGN ESSENTIAL HYPERTENSION: ICD-10-CM

## 2024-10-02 NOTE — TELEPHONE ENCOUNTER
Patient has upcoming Preventative visit in November and is almost out of medication attached.    Louisa Sherwood RN, BSN  [facility]

## 2024-10-03 ENCOUNTER — MYC MEDICAL ADVICE (OUTPATIENT)
Dept: FAMILY MEDICINE | Facility: CLINIC | Age: 49
End: 2024-10-03

## 2024-10-03 RX ORDER — LOSARTAN POTASSIUM AND HYDROCHLOROTHIAZIDE 12.5; 1 MG/1; MG/1
1 TABLET ORAL DAILY
Qty: 90 TABLET | Refills: 0 | Status: SHIPPED | OUTPATIENT
Start: 2024-10-03

## 2024-10-03 RX ORDER — AMLODIPINE BESYLATE 5 MG/1
5 TABLET ORAL DAILY
Qty: 90 TABLET | Refills: 0 | Status: SHIPPED | OUTPATIENT
Start: 2024-10-03

## 2024-10-10 ENCOUNTER — MYC REFILL (OUTPATIENT)
Dept: FAMILY MEDICINE | Facility: CLINIC | Age: 49
End: 2024-10-10
Payer: COMMERCIAL

## 2024-10-10 DIAGNOSIS — E78.5 DYSLIPIDEMIA: ICD-10-CM

## 2024-10-10 RX ORDER — ATORVASTATIN CALCIUM 40 MG/1
40 TABLET, FILM COATED ORAL DAILY
Qty: 90 TABLET | Refills: 0 | Status: SHIPPED | OUTPATIENT
Start: 2024-10-10

## 2024-11-06 ENCOUNTER — DOCUMENTATION ONLY (OUTPATIENT)
Dept: FAMILY MEDICINE | Facility: CLINIC | Age: 49
End: 2024-11-06
Payer: COMMERCIAL

## 2024-11-06 DIAGNOSIS — Z13.6 CARDIOVASCULAR SCREENING; LDL GOAL LESS THAN 130: ICD-10-CM

## 2024-11-06 DIAGNOSIS — Z00.00 PREVENTATIVE HEALTH CARE: ICD-10-CM

## 2024-11-06 DIAGNOSIS — Z12.5 SCREENING PSA (PROSTATE SPECIFIC ANTIGEN): Primary | ICD-10-CM

## 2024-11-06 NOTE — PROGRESS NOTES
Rik PEÑA Serena has an upcoming lab appointment:    Future Appointments   Date Time Provider Department Center   11/19/2024  7:30 AM AN LAB [facility]   11/26/2024 12:30 PM Darrell Jones MD [facility]       There is no order available. Please review and place either future orders or HMPO (Review of Health Maintenance Protocol Orders), as appropriate.    Health Maintenance Due   Topic    ANNUAL REVIEW OF HM ORDERS     HIV SCREENING     HEPATITIS C SCREENING     BMP     LIPID      Edilia SANDERST

## 2024-11-19 ENCOUNTER — LAB (OUTPATIENT)
Dept: LAB | Facility: CLINIC | Age: 49
End: 2024-11-19
Payer: COMMERCIAL

## 2024-11-19 DIAGNOSIS — Z12.5 SCREENING PSA (PROSTATE SPECIFIC ANTIGEN): ICD-10-CM

## 2024-11-19 DIAGNOSIS — Z13.6 CARDIOVASCULAR SCREENING; LDL GOAL LESS THAN 130: ICD-10-CM

## 2024-11-19 DIAGNOSIS — Z00.00 PREVENTATIVE HEALTH CARE: ICD-10-CM

## 2024-11-19 LAB
ALBUMIN SERPL BCG-MCNC: 4.5 G/DL (ref 3.5–5.2)
ANION GAP SERPL CALCULATED.3IONS-SCNC: 15 MMOL/L (ref 7–15)
BUN SERPL-MCNC: 17.5 MG/DL (ref 6–20)
CALCIUM SERPL-MCNC: 9.6 MG/DL (ref 8.8–10.4)
CHLORIDE SERPL-SCNC: 103 MMOL/L (ref 98–107)
CHOLEST SERPL-MCNC: 173 MG/DL
CREAT SERPL-MCNC: 0.87 MG/DL (ref 0.67–1.17)
EGFRCR SERPLBLD CKD-EPI 2021: >90 ML/MIN/1.73M2
FASTING STATUS PATIENT QL REPORTED: YES
GLUCOSE SERPL-MCNC: 116 MG/DL (ref 70–99)
HCO3 SERPL-SCNC: 21 MMOL/L (ref 22–29)
HDLC SERPL-MCNC: 38 MG/DL
LDLC SERPL CALC-MCNC: 86 MG/DL
NONHDLC SERPL-MCNC: 135 MG/DL
PHOSPHATE SERPL-MCNC: 3.7 MG/DL (ref 2.5–4.5)
POTASSIUM SERPL-SCNC: 3.7 MMOL/L (ref 3.4–5.3)
PSA SERPL DL<=0.01 NG/ML-MCNC: 0.87 NG/ML (ref 0–2.5)
SODIUM SERPL-SCNC: 139 MMOL/L (ref 135–145)
TRIGL SERPL-MCNC: 246 MG/DL

## 2024-11-19 PROCEDURE — G0103 PSA SCREENING: HCPCS

## 2024-11-19 PROCEDURE — 36415 COLL VENOUS BLD VENIPUNCTURE: CPT

## 2024-11-19 PROCEDURE — 80061 LIPID PANEL: CPT

## 2024-11-19 PROCEDURE — 80069 RENAL FUNCTION PANEL: CPT

## 2024-11-26 ENCOUNTER — OFFICE VISIT (OUTPATIENT)
Dept: FAMILY MEDICINE | Facility: CLINIC | Age: 49
End: 2024-11-26
Payer: COMMERCIAL

## 2024-11-26 VITALS
HEART RATE: 77 BPM | WEIGHT: 223 LBS | OXYGEN SATURATION: 97 % | BODY MASS INDEX: 33.03 KG/M2 | DIASTOLIC BLOOD PRESSURE: 78 MMHG | HEIGHT: 69 IN | SYSTOLIC BLOOD PRESSURE: 122 MMHG | TEMPERATURE: 98.7 F | RESPIRATION RATE: 16 BRPM

## 2024-11-26 DIAGNOSIS — E78.5 HYPERLIPIDEMIA LDL GOAL <130: ICD-10-CM

## 2024-11-26 DIAGNOSIS — I10 BENIGN ESSENTIAL HYPERTENSION: ICD-10-CM

## 2024-11-26 DIAGNOSIS — E78.5 DYSLIPIDEMIA: ICD-10-CM

## 2024-11-26 DIAGNOSIS — R73.9 HYPERGLYCEMIA: ICD-10-CM

## 2024-11-26 DIAGNOSIS — Z00.00 ROUTINE HISTORY AND PHYSICAL EXAMINATION OF ADULT: Primary | ICD-10-CM

## 2024-11-26 PROCEDURE — 99213 OFFICE O/P EST LOW 20 MIN: CPT | Mod: 25 | Performed by: FAMILY MEDICINE

## 2024-11-26 PROCEDURE — 99396 PREV VISIT EST AGE 40-64: CPT | Performed by: FAMILY MEDICINE

## 2024-11-26 RX ORDER — METOPROLOL SUCCINATE 100 MG/1
100 TABLET, EXTENDED RELEASE ORAL DAILY
Qty: 90 TABLET | Refills: 2 | Status: SHIPPED | OUTPATIENT
Start: 2024-11-26

## 2024-11-26 RX ORDER — ATORVASTATIN CALCIUM 40 MG/1
40 TABLET, FILM COATED ORAL DAILY
Qty: 90 TABLET | Refills: 2 | Status: SHIPPED | OUTPATIENT
Start: 2024-11-26

## 2024-11-26 RX ORDER — LOSARTAN POTASSIUM AND HYDROCHLOROTHIAZIDE 12.5; 1 MG/1; MG/1
1 TABLET ORAL DAILY
Qty: 90 TABLET | Refills: 2 | Status: SHIPPED | OUTPATIENT
Start: 2024-11-26

## 2024-11-26 RX ORDER — AMLODIPINE BESYLATE 5 MG/1
5 TABLET ORAL DAILY
Qty: 90 TABLET | Refills: 2 | Status: SHIPPED | OUTPATIENT
Start: 2024-11-26

## 2024-11-26 SDOH — HEALTH STABILITY: PHYSICAL HEALTH: ON AVERAGE, HOW MANY MINUTES DO YOU ENGAGE IN EXERCISE AT THIS LEVEL?: 60 MIN

## 2024-11-26 SDOH — HEALTH STABILITY: PHYSICAL HEALTH: ON AVERAGE, HOW MANY DAYS PER WEEK DO YOU ENGAGE IN MODERATE TO STRENUOUS EXERCISE (LIKE A BRISK WALK)?: 5 DAYS

## 2024-11-26 ASSESSMENT — PAIN SCALES - GENERAL: PAINLEVEL_OUTOF10: NO PAIN (1)

## 2024-11-26 ASSESSMENT — SOCIAL DETERMINANTS OF HEALTH (SDOH): HOW OFTEN DO YOU GET TOGETHER WITH FRIENDS OR RELATIVES?: THREE TIMES A WEEK

## 2024-11-26 NOTE — PROGRESS NOTES
Preventive Care Visit  [facility]  Darrell Jones MD, Family Medicine  Nov 26, 2024      ASSESSMENT / PLAN:  (Z00.00) Routine history and physical examination of adult  (primary encounter diagnosis)  Comment: generally healthy and normal exam/labs  Plan: Reviewed self mole/testicle check handout.  vitmainD.     (I10) Benign essential hypertension  Comment: stable  Plan: metoprolol succinate ER (TOPROL XL) 100 MG 24         hr tablet, losartan-hydrochlorothiazide         (HYZAAR) 100-12.5 MG tablet, amLODIPine         (NORVASC) 5 MG tablet        Continue exercise,. Normal angiogram. Continue self-monitor.     (R73.9) Hyperglycemia  Comment: pre-dm  Plan: diet/exercise. Consider metformin if a lot worse     (E78.5) Hyperlipidemia LDL goal <130  Comment: stale  Plan: consider lower lipitor. Reveiwed risks and side effects of medication      (E78.5) Dyslipidemia    Plan: atorvastatin (LIPITOR) 40 MG tablet              Jacob Jolly is a 49 year old, presenting for the following:  Physical  History htn, high cholesterol, hyperglycemia and anxiety. Dad with dm in60's  No pop. Lower carbs in diet. Eating meat. Working - active.    Not needed xanax.   Rare panic attacks. No chest pain or shortness of breath. Some ecigs. ALCOHOL  weekends/socially.   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  2 kids 13,9yo- boys.   No nausea, vomiting or diarrhea or black/bloody stoosl. Normal colonoscopy lastd  year.  No mole changes or rashes.   Some milk and vatiminD. No testicle pain/masses/hernia. No std concerns. Dentist 6months ago. Eye exam 1 years.       11/26/2024    12:10 PM   Additional Questions   Roomed by Angie   Accompanied by self         11/26/2024    12:10 PM   Patient Reported Additional Medications   Patient reports taking the following new medications n/a          HPI          Health Care Directive  Patient does not have a Health Care Directive: Discussed advance care planning with patient;  however, patient declined at this time.      11/26/2024   General Health   How would you rate your overall physical health? (!) FAIR   Feel stress (tense, anxious, or unable to sleep) To some extent      (!) STRESS CONCERN      11/26/2024   Nutrition   Three or more servings of calcium each day? Yes   Diet: Regular (no restrictions)   How many servings of fruit and vegetables per day? (!) 2-3   How many sweetened beverages each day? 0-1            11/26/2024   Exercise   Days per week of moderate/strenous exercise 5 days   Average minutes spent exercising at this level 60 min            11/26/2024   Social Factors   Frequency of gathering with friends or relatives Three times a week   Worry food won't last until get money to buy more No   Food not last or not have enough money for food? No   Do you have housing? (Housing is defined as stable permanent housing and does not include staying ouside in a car, in a tent, in an abandoned building, in an overnight shelter, or couch-surfing.) Yes   Are you worried about losing your housing? No   Lack of transportation? No   Unable to get utilities (heat,electricity)? No            11/26/2024   Dental   Dentist two times every year? Yes            11/26/2024   TB Screening   Were you born outside of the [place]? No            Today's PHQ-2 Score:       11/26/2024    12:08 PM   PHQ-2 ( 1999 [employer])   Q1: Little interest or pleasure in doing things 0    Q2: Feeling down, depressed or hopeless 0    PHQ-2 Score 0    Q1: Little interest or pleasure in doing things Not at all   Q2: Feeling down, depressed or hopeless Not at all   PHQ-2 Score 0       Patient-reported           11/26/2024   Substance Use   Alcohol more than 3/day or more than 7/wk No   Do you use any other substances recreationally? No        Social History     Tobacco Use    Smoking status: Former     Types: Cigarettes    Smokeless tobacco: Never   Vaping Use    Vaping status: Never Used   Substance Use Topics    Alcohol  "use: Yes     Comment: Occ    Drug use: No             11/26/2024   One time HIV Screening   Previous HIV test? No          11/26/2024   STI Screening   New sexual partner(s) since last STI/HIV test? No      ASCVD Risk   The 10-year ASCVD risk score (Bijan ZELAYA, et al., 2019) is: 3.7%    Values used to calculate the score:      Age: 49 years      Sex: Male      Is Non- : No      Diabetic: No      Tobacco smoker: No      Systolic Blood Pressure: 122 mmHg      Is BP treated: Yes      HDL Cholesterol: 38 mg/dL      Total Cholesterol: 173 mg/dL       Reviewed and updated as needed this visit by Provider                             Objective    Exam  /78   Pulse 77   Temp 98.7  F (37.1  C) (Tympanic)   Resp 16   Ht 1.746 m (5' 8.75\")   Wt 101.2 kg (223 lb)   SpO2 97%   BMI 33.17 kg/m     Estimated body mass index is 33.17 kg/m  as calculated from the following:    Height as of this encounter: 1.746 m (5' 8.75\").    Weight as of this encounter: 101.2 kg (223 lb).    Physical Exam  GENERAL: alert and no distress  EYES: Eyes grossly normal to inspection, PERRL and conjunctivae and sclerae normal  HENT: ear canals and TM's normal, nose and mouth without ulcers or lesions  NECK: no adenopathy, no asymmetry, masses, or scars  RESP: lungs clear to auscultation - no rales, rhonchi or wheezes  BREAST: normal without masses, tenderness or nipple discharge and no palpable axillary masses or adenopathy  CV: regular rate and rhythm, normal S1 S2, no S3 or S4, no murmur, click or rub, no peripheral edema   ABDOMEN: soft, nontender, no hepatosplenomegaly, no masses and bowel sounds normal   (male): patient deferred /rectal  MS: no gross musculoskeletal defects noted, no edema  SKIN: no suspicious lesions or rashes  NEURO: Normal strength and tone, mentation intact and speech normal  PSYCH: mentation appears normal, affect normal/bright  LYMPH: no cervical, supraclavicular, axillary " No adenopathy        Signed Electronically by: Darrell Jones MD

## 2024-12-24 ENCOUNTER — MYC MEDICAL ADVICE (OUTPATIENT)
Dept: FAMILY MEDICINE | Facility: CLINIC | Age: 49
End: 2024-12-24
Payer: COMMERCIAL

## 2024-12-24 DIAGNOSIS — M54.42 CHRONIC BILATERAL LOW BACK PAIN WITH BILATERAL SCIATICA: Primary | ICD-10-CM

## 2024-12-24 DIAGNOSIS — M54.41 CHRONIC BILATERAL LOW BACK PAIN WITH BILATERAL SCIATICA: Primary | ICD-10-CM

## 2024-12-24 DIAGNOSIS — G89.29 CHRONIC BILATERAL LOW BACK PAIN WITH BILATERAL SCIATICA: Primary | ICD-10-CM

## 2025-01-23 ENCOUNTER — MYC REFILL (OUTPATIENT)
Dept: FAMILY MEDICINE | Facility: CLINIC | Age: 50
End: 2025-01-23
Payer: COMMERCIAL

## 2025-01-23 DIAGNOSIS — E78.5 DYSLIPIDEMIA: ICD-10-CM

## 2025-01-23 DIAGNOSIS — I10 BENIGN ESSENTIAL HYPERTENSION: ICD-10-CM

## 2025-01-23 RX ORDER — AMLODIPINE BESYLATE 5 MG/1
5 TABLET ORAL DAILY
Qty: 90 TABLET | Refills: 2 | OUTPATIENT
Start: 2025-01-23

## 2025-01-23 RX ORDER — LOSARTAN POTASSIUM AND HYDROCHLOROTHIAZIDE 12.5; 1 MG/1; MG/1
1 TABLET ORAL DAILY
Qty: 90 TABLET | Refills: 2 | OUTPATIENT
Start: 2025-01-23

## 2025-01-23 RX ORDER — ATORVASTATIN CALCIUM 40 MG/1
40 TABLET, FILM COATED ORAL DAILY
Qty: 90 TABLET | Refills: 2 | OUTPATIENT
Start: 2025-01-23

## 2025-01-24 ASSESSMENT — PAIN SCALES - PAIN ENJOYMENT GENERAL ACTIVITY SCALE (PEG)
PEG_TOTALSCORE: 6
INTERFERED_GENERAL_ACTIVITY: 8
AVG_PAIN_PASTWEEK: 5
PEG_TOTALSCORE: 6
AVG_PAIN_PASTWEEK: 5
INTERFERED_GENERAL_ACTIVITY: 8
INTERFERED_ENJOYMENT_LIFE: 5
INTERFERED_ENJOYMENT_LIFE: 5

## 2025-01-24 ASSESSMENT — ANXIETY QUESTIONNAIRES
4. TROUBLE RELAXING: NOT AT ALL
GAD7 TOTAL SCORE: 0
1. FEELING NERVOUS, ANXIOUS, OR ON EDGE: NOT AT ALL
8. IF YOU CHECKED OFF ANY PROBLEMS, HOW DIFFICULT HAVE THESE MADE IT FOR YOU TO DO YOUR WORK, TAKE CARE OF THINGS AT HOME, OR GET ALONG WITH OTHER PEOPLE?: NOT DIFFICULT AT ALL
GAD7 TOTAL SCORE: 0
GAD7 TOTAL SCORE: 0
7. FEELING AFRAID AS IF SOMETHING AWFUL MIGHT HAPPEN: NOT AT ALL
7. FEELING AFRAID AS IF SOMETHING AWFUL MIGHT HAPPEN: NOT AT ALL
6. BECOMING EASILY ANNOYED OR IRRITABLE: NOT AT ALL
3. WORRYING TOO MUCH ABOUT DIFFERENT THINGS: NOT AT ALL
5. BEING SO RESTLESS THAT IT IS HARD TO SIT STILL: NOT AT ALL
IF YOU CHECKED OFF ANY PROBLEMS ON THIS QUESTIONNAIRE, HOW DIFFICULT HAVE THESE PROBLEMS MADE IT FOR YOU TO DO YOUR WORK, TAKE CARE OF THINGS AT HOME, OR GET ALONG WITH OTHER PEOPLE: NOT DIFFICULT AT ALL
2. NOT BEING ABLE TO STOP OR CONTROL WORRYING: NOT AT ALL

## 2025-01-26 DIAGNOSIS — E78.5 DYSLIPIDEMIA: ICD-10-CM

## 2025-01-26 DIAGNOSIS — I10 BENIGN ESSENTIAL HYPERTENSION: ICD-10-CM

## 2025-01-26 NOTE — TELEPHONE ENCOUNTER
Medication Question or Refill    Contacts       Contact Date/Time Type Contact Phone/Fax    01/26/2025 09:46 AM CST Phone (Incoming) SerenaDelmi (Emergency Contact) [phone] (H)            What medication are you calling about (include dose and sig)?: LOSARTAN, ATORVASTATIN, AMLODIPINE     Preferred Pharmacy:   [id number] [address] [place] [address] AT [address]  [address] [facility] [address]  Phone: [phone] Fax: [phone]      Controlled Substance Agreement on file:   CSA -- Patient Level:    CSA: None found at the patient level.       Who prescribed the medication?: PCP     Do you need a refill? Yes    When did you use the medication last? 01/26/25    Patient offered an appointment? No    Do you have any questions or concerns?  Yes: IT SAYS HE HAS 2 REFILLS BUT THEN IT SAYS HE HAS 0 REFILLS, AND WHEN THEY SENT THE MED REFILL REQUEST IT WAS DENIED WOULD LIKE TO SPEAK WITH PROVIDER.       Could we send this information to you in [facility] or would you prefer to receive a phone call?:   Patient would prefer a phone call   Okay to leave a detailed message?: Yes at Cell number on file:    Telephone Information:   Mobile [phone]

## 2025-01-27 ENCOUNTER — OFFICE VISIT (OUTPATIENT)
Dept: PALLIATIVE MEDICINE | Facility: CLINIC | Age: 50
End: 2025-01-27
Attending: FAMILY MEDICINE
Payer: COMMERCIAL

## 2025-01-27 VITALS — SYSTOLIC BLOOD PRESSURE: 161 MMHG | DIASTOLIC BLOOD PRESSURE: 106 MMHG | HEART RATE: 98 BPM

## 2025-01-27 DIAGNOSIS — M47.816 SPONDYLOSIS OF LUMBAR REGION WITHOUT MYELOPATHY OR RADICULOPATHY: ICD-10-CM

## 2025-01-27 DIAGNOSIS — G89.29 CHRONIC BILATERAL THORACIC BACK PAIN: Primary | ICD-10-CM

## 2025-01-27 DIAGNOSIS — M51.360 DEGENERATION OF INTERVERTEBRAL DISC OF LUMBAR REGION WITH DISCOGENIC BACK PAIN: ICD-10-CM

## 2025-01-27 DIAGNOSIS — M54.6 CHRONIC BILATERAL THORACIC BACK PAIN: Primary | ICD-10-CM

## 2025-01-27 DIAGNOSIS — M54.2 PAIN OF CERVICAL FACET JOINT: ICD-10-CM

## 2025-01-27 DIAGNOSIS — M54.59 LUMBAR FACET JOINT PAIN: ICD-10-CM

## 2025-01-27 DIAGNOSIS — G89.29 CHRONIC UPPER BACK PAIN: ICD-10-CM

## 2025-01-27 DIAGNOSIS — M79.18 MYOFASCIAL PAIN: ICD-10-CM

## 2025-01-27 DIAGNOSIS — F40.240 CLAUSTROPHOBIA: ICD-10-CM

## 2025-01-27 DIAGNOSIS — M54.9 CHRONIC UPPER BACK PAIN: ICD-10-CM

## 2025-01-27 DIAGNOSIS — M62.838 MUSCLE SPASM: ICD-10-CM

## 2025-01-27 PROCEDURE — 99205 OFFICE O/P NEW HI 60 MIN: CPT | Performed by: NURSE PRACTITIONER

## 2025-01-27 PROCEDURE — G2211 COMPLEX E/M VISIT ADD ON: HCPCS | Performed by: NURSE PRACTITIONER

## 2025-01-27 RX ORDER — TIZANIDINE 2 MG/1
2-4 TABLET ORAL 3 TIMES DAILY PRN
Qty: 45 TABLET | Refills: 1 | Status: SHIPPED | OUTPATIENT
Start: 2025-01-27

## 2025-01-27 RX ORDER — ATORVASTATIN CALCIUM 40 MG/1
40 TABLET, FILM COATED ORAL DAILY
Qty: 90 TABLET | Refills: 0 | Status: SHIPPED | OUTPATIENT
Start: 2025-01-27

## 2025-01-27 RX ORDER — GABAPENTIN 300 MG/1
CAPSULE ORAL
Qty: 120 CAPSULE | Refills: 0 | Status: SHIPPED | OUTPATIENT
Start: 2025-01-27

## 2025-01-27 RX ORDER — LOSARTAN POTASSIUM AND HYDROCHLOROTHIAZIDE 12.5; 1 MG/1; MG/1
1 TABLET ORAL DAILY
Qty: 90 TABLET | Refills: 0 | Status: SHIPPED | OUTPATIENT
Start: 2025-01-27

## 2025-01-27 RX ORDER — AMLODIPINE BESYLATE 5 MG/1
5 TABLET ORAL DAILY
Qty: 90 TABLET | Refills: 0 | Status: SHIPPED | OUTPATIENT
Start: 2025-01-27

## 2025-01-27 RX ORDER — DIAZEPAM 5 MG/1
TABLET ORAL
Qty: 1 TABLET | Refills: 0 | Status: SHIPPED | OUTPATIENT
Start: 2025-01-27

## 2025-01-27 ASSESSMENT — PAIN SCALES - GENERAL: PAINLEVEL_OUTOF10: MILD PAIN (2)

## 2025-01-27 NOTE — PATIENT INSTRUCTIONS
PLAN:  Physical Therapy: consider in the future  Clinical Health Psychologist to address issues of relaxation, behavioral change, coping style, and other factors important to improvement: none  Diagnostic Studies: obtain MRI of neck and mid back (cervical and thoracic).   [facility] Scheduling:  [facility] [place]: [phone]  Rufina Clarke: [phone]  [facility] (including [place]): [phone]  Take VALIUM 5mg 1 tablet 30-60 minutes prior to MRI, don't drive for 24 hours after taking this medication  Medication Management:   START tizanidine 2mg take 1-2 tabs up to 3 times daily as needed for muscle spasms   Wait 2-3 days THEN START gabapentin 300mg capsules. Take 300mg at bedtime for 7 days, then take 300mg twice daily for 7 days, then take 300mg three times daily for 7 days, then take 300mg in the morning and afternoon and 600mg (2 capsules) at bedtime. If side effects, reduce to last tolerable dosage.   We don't recommend use of opiate/narcotics for chronic pain management  Further procedures recommended: none at present, need MRI first  Recommendations/follow-up for PCP:  see above  Release of information: see above  Follow up: video visit with Princess at 11:30 AM on Monday 2/24/2025.     ----------------------------------------------------------------  Clinic Number:  [phone]   Call with any questions about your care and for scheduling assistance.   Calls are returned Monday through Friday between 8 AM and 4:30 PM. We usually get back to you within 2 business days depending on the issue/request.    If we are prescribing your medications:  For opioid medication refills, call the clinic or send a [employer] message 7 days in advance.  Please include:  Name of requested medication  Name of the pharmacy.  For non-opioid medications, call your pharmacy directly to request a refill. Please allow 3-4 days to be processed.   Per [place] State Law:  All controlled substance prescriptions must  be filled within 30 days of being written.    For those controlled substances allowing refills, pickup must occur within 30 days of last fill.      We believe regular attendance is key to your success in our program!    Any time you are unable to keep your appointment we ask that you call us at least 24 hours in advance to cancel.This will allow us to offer the appointment time to another patient.   Multiple missed appointments may lead to dismissal from the clinic.

## 2025-01-27 NOTE — PROGRESS NOTES
[facility] Consultation    Date of visit: 1/27/2025    Reason for consultation:    Rik Lyons is a 49 year old male who is seen in consultation today at the request of his provider, Dr. Darrell Jones Primary Care Provider re: patient's chronic bilateral low back pain with bilateral sciatica.      Primary Care Provider is Darrell Jones.  Pain medications are being prescribed by no opiates.    Pain Questionnaire    What number best describes your pain right now: (Patient-Rptd) 5  (0 = No pain to 10 = Worst pain imaginable)    How would you describe the pain? (Patient-Rptd) sharp, dull, aching    Which of the following worsen your pain? (Patient-Rptd) sitting, coughing / sneezing    Which of the following improve or reduce your pain? (Patient-Rptd) lying down    What number best describes your average pain for the past week: (Patient-Rptd) 5  (0 = No pain to 10 = Worst pain imaginable)    What number best describes your LOWEST pain in past 24 hours: (Patient-Rptd) 2  (0 = No pain to 10 = Worst pain imaginable)    What number best describes your WORST pain in past 24 hours: (Patient-Rptd) 8  (0 = No pain to 10 = Worst pain imaginable)    When is your pain worst? (Patient-Rptd) Constant    What non-medicine treatments have you already had for your pain? (Patient-Rptd) chiropractic care, spine injections (shots)    Have you tried treating your pain with medication? (Patient-Rptd) No         Chief Complaint:  low back pain for years. September 2024, on golf trip and as he swung he had immediate pain, coughing and breathing can be painful.   Chief Complaint   Patient presents with    Pain       Pain history:  Rik Lyons is a 49 year old male who first started having problems with pain as follows:     Pain history collected at initial consult on 1/27/2025  -had low back for many years, over a decade. His job is not great for his back, he does install of hardwood floors. He does not  "have pain in his legs. He has had injections in the past, sounds like LESI in the past which have been helpful.    Starting September 2024, on golf trip, as he swung had immediate pain in the mid to upper back. It hurts when he breathes or coughs. This pain has not gone away. He states he feels like \"my back is broken.\" Getting up out of a chair at home can be very painful. He feels better during the day while he is working, the afternoon after a lunch break bothers him more.     His BP is high today, didn't take BP meds as he ran out yesterday.         Pain rating: intensity ranges from 2/10 to 8/10, and Averages 5/10 on a 0-10 scale.  Pain right now is 2/10.   Describes pain as \"sharp, dull, aching.\"  Pain is constant.      Home self care includes: pace yourself, stay busy and moving, stretching    Aggravating factors include: sitting, coughing, sneezing    Relieving factors include: lying down    Any bowel or bladder incontinence: none      Current pain-related medication treatments include:  -none    Other pertinent medications:  none      Previous medication treatments included:    OPIATES:after surgery, not sure which one  NSAIDS: Advil (helpful)  MUSCLE RELAXANTS: cyclobenzaprine (unsure)  ANTI-MIGRAINE MEDS: none  ANTI-DEPRESSANTS: none  SLEEP AIDS: none  ANTI-CONVULSANTS: none  TOPICALS: lidocaine patches (somewhat helpful)  ANXIOLYTICS: alprazolam (unsure)  MEDICAL CANNABIS: none  Other meds: none    Medications and Allergies reviewed. Yes     Other treatments have included:  Rik Lyons has been seen at a pain clinic in the past.  Has had injections with our clinic in the past    Physical therapy: No    Psychologist: No   Chiropractor: Yes somewhat helpful  Acupuncture: No   Pharmacotherapy:    Opioids: No     Non-opioids:  Yes  TENs Unit/electric stim: Yes at chiropractic office, unsure if helpful  Heat/Cold: No       Injections:   -12/21/2027 L4-5 interlaminar ERICKA with Dr. Rajiv Chan "   -7/13/2028 lumbar interlaminar ERICKA with Dr. Adria Benito  -2/12/2019 left SI joint injection with Dr. Maranda Arango   -8/13/2020 lumbar L4-5 interlaminar ERICKA with Dr. Alberto Koenig     Pertinent Surgeries:  none    Past Medical History:  Past Medical History:   Diagnosis Date    Hypertension      Past Surgical History:  Past Surgical History:   Procedure Laterality Date    APPENDECTOMY      COLONOSCOPY WITH CO2 INSUFFLATION N/A 5/19/2023    Procedure: COLONOSCOPY, WITH CO2 INSUFFLATION;  Surgeon: Sheldon Morel MD;  Location:  OR     Medications:  Current Outpatient Medications   Medication Sig Dispense Refill    amLODIPine (NORVASC) 5 MG tablet Take 1 tablet (5 mg) by mouth daily. 90 tablet 2    atorvastatin (LIPITOR) 40 MG tablet Take 1 tablet (40 mg) by mouth daily. 90 tablet 2    losartan-hydrochlorothiazide (HYZAAR) 100-12.5 MG tablet Take 1 tablet by mouth daily. 90 tablet 2    magnesium gluconate (MAGONATE) 250 MG tablet Take 500 mg by mouth 2 times daily      metoprolol succinate ER (TOPROL XL) 100 MG 24 hr tablet Take 1 tablet (100 mg) by mouth daily. 90 tablet 2    vitamin D3 (CHOLECALCIFEROL) 50 mcg (2000 units) tablet Take 1 tablet by mouth daily      ALPRAZolam (XANAX) 0.25 MG tablet 1-2 tab daily as needed for panic attacks/anxiety or sleep. Avoid with driving or ALCOHOL. (Patient not taking: Reported on 1/27/2025) 10 tablet 0     Allergies:   No Known Allergies  Social History:  Home situation:  with kids x 2 at home  Occupation/Schooling: owns own business as   Tobacco use: quit smoking cigarettes 2014 and uses vape now daily  Alcohol use: weekends, beer and hard liquor  Drug use: none. Had a poor experience with cannabis  History of chemical dependency treatment: none    Family history:  Family History   Problem Relation Age of Onset    Other Cancer Mother     Coronary Artery Disease Father         age 53    Diabetes Father          Review of Systems:  The  14 system ROS was reviewed with the patient and is positive for: positives are in bold  Constitutional: fever/chills, fatigue, weight gain, weight loss  Eyes/Head: headache, dizziness  ENT: ringing in ears  Allergy/Immune: allergies  Skin: itching, rash, hives  Hematologic: easy bruising  Respiratory: cough, wheezing, shortness of breath  Cardiovascular: swelling in feet, fainting, palpitations, chest pain  GI: abdominal pain, nausea, vomiting, diarrhea, constipation  Endocrine: steroid use  Musculoskeletal:  joint pain, arthritis, stiffness, gout, back pain, neck pain  Urinary: frequency, urgency, incontinence, hesitancy  Neurologic: weakness, numbness/tingling, seizure, stroke, memory loss  Mental health: depression, anxiety, stress, suicidal ideation      Physical Exam:  Vitals:    01/27/25 1040   BP: (!) 161/106   Pulse: 98     Exam:  Constitutional: healthy, alert, and no distress  Head: normocephalic. Atraumatic.   Eyes: no redness or jaundice noted   ENT: oropharnx normal.  MMM.   Cardiovascular: RRR no m/g/r   Respiratory: clear to auscultation A/P. Respirations easy and unlabored. Able to speak in full sentences without SOB or cough noted.    Gastrointestinal: soft, non-tender   : deferred  Skin: no suspicious lesions or rashes  Psychiatric: mentation appears normal and affect normal/bright    Musculoskeletal exam:  Gait/Station/Posture: fair posture. Normal gait. Able to rise onto toes and heels. Able to perform tandem gait    Cervical spine:    Flex:  20 degrees   Ext: 20 degrees   Rotation to right: 85 degrees   Rotation to left: 85 degrees   Ext/rotation to the right pain free   Ext/rotation to the left pain free    Thoracic spine:  normal ROM. No pain to palpation    Lumbar spine:    Flex:  90 degrees   Ext: 20 degrees   Rotation/ext to right: painful    Rotation/ext to left: painful    SI joints: Non-tender bilaterally    Piriformis: Non-tender bilaterally    Greater trochanters: Non-tender  bilaterally     Myofascial tenderness:  upper back   Straight leg exam: negative  Hoang's maneuver: not done    Neurologic exam:  CN:  Cranial nerves 2-12 are  Grossly normal  Motor:  5/5 UE and LE strength  Reflexes:     Biceps:     R:  1/4 L: 1/4   Brachioradialis   R:  1/4 L: 1/4      Patella:  R:  1/4 L: 1/4   Achilles:  R:  1/4 L: 1/4  Other reflexes:    Ruelas's negative  Sensory:  (upper and lower extremities):   Light touch: normal    Vibration: normal    Pin prick: normal    Allodynia: absent    Dysethesia: absent    Hyperalgesia: absent         IMAGING:  MR LUMBAR SPINE WITHOUT CONTRAST 10/31/2017 8:30 AM     HISTORY: Bilateral low back pain and sciatica.     TECHNIQUE: Sagittal T1 and T2, sagittal IR, and transverse proton  density and T2-weighted pulse sequences.     FINDINGS: Five lumbar vertebrae are assumed. Mild loss of disc signal  with relatively normal disc height is noted at L5-S1. Moderate left  L4-5 apophyseal joint degenerative arthrosis is noted with mild  periarticular reactive marrow edema. Body heights and sagittal  alignment are within normal limits. The conus medullaris is  unremarkable in appearance on the sagittal images.      L2-3, L3-4: Normal.     L4-5: Annular bulge or broad-based central disc protrusion minimally  indenting the thecal sac. No central stenosis. Mild to moderate  bilateral foraminal stenosis below the exiting nerve roots.     L5-S1: Small central disc extrusion with slight cephalad dissection  slightly to the right of midline. This only mildly indents the thecal  sac. Small 0.3 cm disc fragment is also noted lateral to the right S1  nerve root near its origin from the thecal sac. There is no central  stenosis or asymmetrical displacement of either S1 nerve root. The L5  neural foramina are widely patent.                                                                      IMPRESSION:  1. L4-5 left apophyseal joint degenerative arthrosis and  periarticular  reactive marrow edema suggesting this could be a pain generator.  2. L4-5 mild to moderate bilateral foraminal stenosis.  3. L5-S1 small cephalad dissecting disc extrusion to the right of  midline without apparent direct neural impingement or stenosis. Small  0.3 cm disc fragment is also noted lateral to the right S1 nerve root  near its origin from the thecal sac. No nerve root displacement is  demonstrated.     ALDAIR ELENA MD      LABS:  Creatinine   Date Value Ref Range Status   11/19/2024 0.87 0.67 - 1.17 mg/dL Final   06/26/2020 0.80 0.66 - 1.25 mg/dL Final     GFR Estimate   Date Value Ref Range Status   11/19/2024 >90 >60 mL/min/1.73m2 Final     Comment:     eGFR calculated using 2021 CKD-EPI equation.   06/26/2020 >90 >60 mL/min/[1.73_m2] Final     Comment:     Non  GFR Calc  Starting 12/18/2018, serum creatinine based estimated GFR (eGFR) will be   calculated using the Chronic Kidney Disease Epidemiology Collaboration   (CKD-EPI) equation.       Alkaline Phosphatase   Date Value Ref Range Status   06/26/2020 108 40 - 150 U/L Final     AST   Date Value Ref Range Status   06/26/2020 30 0 - 45 U/L Final     ALT   Date Value Ref Range Status   06/26/2020 62 0 - 70 U/L Final          Screening tools:     DIRE Score for ongoing opioid management is calculated as follows:    Diagnosis = 2    Intractability = 2    Risk: Psych = 2  Chem Hlth = 2  Reliability = 2  Social = 2    Efficacy = 2    Total DIRE Score = 14 (14 or higher predicts good candidate for ongoing opioid management; 13 or lower predicts poor candidate for opioid management)         [place]  Reviewed [place]  January 27, 2025- no concerning fills.  Princess LOYA, RN CNP, FNP  [facility]  [place] location          Assessment:  Chronic bilateral thoracic back pain  Chronic upper back pain  Pain of cervical facet joint  Lumbar facet joint pain  Spondylosis of lumbar region without  myelopathy or radiculopathy  Degeneration of intervertebral disc of lumbar region with discogenic back pain  Myofascial pain  Muscle spasm  Claustrophobia with MRI  PMHx includes: hypertension  PSHx includes: colonoscopy (2023), appendectomy        Plan:  Diagnosis reviewed, treatment option addressed, and risk/benefits discussed.  Self-care instructions given.  I am recommending a multidisciplinary treatment plan to help this patient better manage his pain.      Physical Therapy: consider in the future  Clinical Health Psychologist to address issues of relaxation, behavioral change, coping style, and other factors important to improvement: none  Diagnostic Studies: obtain MRI of neck and mid back (cervical and thoracic).   [facility] Scheduling:  [place] [place] [phone]  [facility] [phone]  [facility] (including [place]): [phone]  Take VALIUM 5mg 1 tablet 30-60 minutes prior to MRI, don't drive for 24 hours after taking this medication  Medication Management:   START tizanidine 2mg take 1-2 tabs up to 3 times daily as needed for muscle spasms   Wait 2-3 days THEN START gabapentin 300mg capsules. Take 300mg at bedtime for 7 days, then take 300mg twice daily for 7 days, then take 300mg three times daily for 7 days, then take 300mg in the morning and afternoon and 600mg (2 capsules) at bedtime. If side effects, reduce to last tolerable dosage.   We don't recommend use of opiate/narcotics for chronic pain management  Further procedures recommended: none at present, need MRI first  Recommendations/follow-up for PCP:  see above  Release of information: see above  Follow up: video visit with Princess at 11:30 AM on Monday 2/24/2025.         ASSESSMENT AND PLAN:  (M54.6,  G89.29) Chronic bilateral thoracic back pain  (primary encounter diagnosis)  Comment:   Plan: MR Thoracic Spine w/o Contrast, gabapentin         (NEURONTIN) 300 MG capsule            (M54.9,  G89.29) Chronic  upper back pain  Comment:   Plan: MR Cervical Spine w/o Contrast, gabapentin         (NEURONTIN) 300 MG capsule            (M54.2) Pain of cervical facet joint  Comment:   Plan: MR Cervical Spine w/o Contrast, gabapentin         (NEURONTIN) 300 MG capsule            (M54.59) Lumbar facet joint pain  Comment:   Plan: gabapentin (NEURONTIN) 300 MG capsule            (M47.816) Spondylosis of lumbar region without myelopathy or radiculopathy  Comment:   Plan: gabapentin (NEURONTIN) 300 MG capsule            (M51.360) Degeneration of intervertebral disc of lumbar region with discogenic back pain  Comment:   Plan: gabapentin (NEURONTIN) 300 MG capsule            (M79.18) Myofascial pain  Comment:   Plan: tiZANidine (ZANAFLEX) 2 MG tablet            (M62.838) Muscle spasm  Comment:   Plan: tiZANidine (ZANAFLEX) 2 MG tablet            (F40.240) Claustrophobia  Comment:   Plan: diazepam (VALIUM) 5 MG tablet              BILLING TIME DOCUMENTATION:   TOTAL TIME includes:   Time spent preparing to see the patient: 3 minutes (reviewing records and tests)  Time spend face to face with the patient: 57 minutes  Time spent ordering tests, medications, procedures and referrals: 0 minutes  Time spent Referring and communicating with other healthcare professionals: 0 minutes  Documenting clinical information in Epic: 4 minutes    The total TIME spent on this patient on the day of the appointment was 64 minutes.            Princess LOYA, RN CNP, FNP  [facility]  [facility] [place]      Answers submitted by the patient for this visit:  Patient Health Questionnaire (G7) (Submitted on 1/24/2025)  LUIS ANTONIO 7 TOTAL SCORE: 0

## 2025-01-29 ENCOUNTER — MYC REFILL (OUTPATIENT)
Dept: FAMILY MEDICINE | Facility: CLINIC | Age: 50
End: 2025-01-29
Payer: COMMERCIAL

## 2025-01-29 ENCOUNTER — MYC MEDICAL ADVICE (OUTPATIENT)
Dept: FAMILY MEDICINE | Facility: CLINIC | Age: 50
End: 2025-01-29
Payer: COMMERCIAL

## 2025-01-29 DIAGNOSIS — E78.5 DYSLIPIDEMIA: ICD-10-CM

## 2025-01-29 DIAGNOSIS — I10 BENIGN ESSENTIAL HYPERTENSION: ICD-10-CM

## 2025-01-29 RX ORDER — LOSARTAN POTASSIUM AND HYDROCHLOROTHIAZIDE 12.5; 1 MG/1; MG/1
1 TABLET ORAL DAILY
Qty: 90 TABLET | Refills: 0 | OUTPATIENT
Start: 2025-01-29

## 2025-01-29 RX ORDER — AMLODIPINE BESYLATE 5 MG/1
5 TABLET ORAL DAILY
Qty: 90 TABLET | Refills: 0 | OUTPATIENT
Start: 2025-01-29

## 2025-01-29 RX ORDER — ATORVASTATIN CALCIUM 40 MG/1
40 TABLET, FILM COATED ORAL DAILY
Qty: 90 TABLET | Refills: 0 | OUTPATIENT
Start: 2025-01-29

## 2025-02-17 ENCOUNTER — ANCILLARY PROCEDURE (OUTPATIENT)
Dept: MRI IMAGING | Facility: CLINIC | Age: 50
End: 2025-02-17
Attending: NURSE PRACTITIONER
Payer: COMMERCIAL

## 2025-02-17 DIAGNOSIS — G89.29 CHRONIC UPPER BACK PAIN: ICD-10-CM

## 2025-02-17 DIAGNOSIS — M54.9 CHRONIC UPPER BACK PAIN: ICD-10-CM

## 2025-02-17 DIAGNOSIS — G89.29 CHRONIC BILATERAL THORACIC BACK PAIN: ICD-10-CM

## 2025-02-17 DIAGNOSIS — M54.6 CHRONIC BILATERAL THORACIC BACK PAIN: ICD-10-CM

## 2025-02-17 DIAGNOSIS — M54.2 PAIN OF CERVICAL FACET JOINT: ICD-10-CM

## 2025-02-17 PROCEDURE — 72141 MRI NECK SPINE W/O DYE: CPT | Mod: TC | Performed by: RADIOLOGY

## 2025-02-17 PROCEDURE — 72146 MRI CHEST SPINE W/O DYE: CPT | Mod: TC | Performed by: RADIOLOGY

## 2025-02-24 ENCOUNTER — VIRTUAL VISIT (OUTPATIENT)
Dept: PALLIATIVE MEDICINE | Facility: CLINIC | Age: 50
End: 2025-02-24
Payer: COMMERCIAL

## 2025-02-24 DIAGNOSIS — M54.2 PAIN OF CERVICAL FACET JOINT: ICD-10-CM

## 2025-02-24 DIAGNOSIS — M47.816 SPONDYLOSIS OF LUMBAR REGION WITHOUT MYELOPATHY OR RADICULOPATHY: ICD-10-CM

## 2025-02-24 DIAGNOSIS — G89.29 CHRONIC BILATERAL THORACIC BACK PAIN: ICD-10-CM

## 2025-02-24 DIAGNOSIS — M54.2 CHRONIC NECK PAIN: ICD-10-CM

## 2025-02-24 DIAGNOSIS — M54.9 CHRONIC UPPER BACK PAIN: ICD-10-CM

## 2025-02-24 DIAGNOSIS — M50.30 DDD (DEGENERATIVE DISC DISEASE), CERVICAL: Primary | ICD-10-CM

## 2025-02-24 DIAGNOSIS — G89.29 CHRONIC UPPER BACK PAIN: ICD-10-CM

## 2025-02-24 DIAGNOSIS — M54.6 CHRONIC BILATERAL THORACIC BACK PAIN: ICD-10-CM

## 2025-02-24 DIAGNOSIS — M51.360 DEGENERATION OF INTERVERTEBRAL DISC OF LUMBAR REGION WITH DISCOGENIC BACK PAIN: ICD-10-CM

## 2025-02-24 DIAGNOSIS — M51.34 DDD (DEGENERATIVE DISC DISEASE), THORACIC: ICD-10-CM

## 2025-02-24 DIAGNOSIS — G89.29 CHRONIC NECK PAIN: ICD-10-CM

## 2025-02-24 DIAGNOSIS — M54.59 LUMBAR FACET JOINT PAIN: ICD-10-CM

## 2025-02-24 PROCEDURE — 98006 SYNCH AUDIO-VIDEO EST MOD 30: CPT | Performed by: NURSE PRACTITIONER

## 2025-02-24 PROCEDURE — G2211 COMPLEX E/M VISIT ADD ON: HCPCS | Performed by: NURSE PRACTITIONER

## 2025-02-24 RX ORDER — GABAPENTIN 300 MG/1
CAPSULE ORAL
Qty: 180 CAPSULE | Refills: 0 | Status: SHIPPED | OUTPATIENT
Start: 2025-02-24

## 2025-02-24 ASSESSMENT — PAIN SCALES - GENERAL: PAINLEVEL_OUTOF10: NO PAIN (0)

## 2025-02-24 ASSESSMENT — PAIN SCALES - PAIN ENJOYMENT GENERAL ACTIVITY SCALE (PEG)
INTERFERED_ENJOYMENT_LIFE: 6
PEG_TOTALSCORE: 5.67
AVG_PAIN_PASTWEEK: 6
INTERFERED_GENERAL_ACTIVITY: 5

## 2025-02-24 NOTE — PROGRESS NOTES
Rik Lyons is a 49 year old who is being evaluated via a billable video visit.      How would you like to obtain your AVS? MyChart  If the video visit is dropped, the invitation should be resent by: Text to cell phone: [phone]  Will anyone else be joining your video visit? No  If patient encounters technical issues they should call [phone]    During this virtual visit the patient is located in [place], patient verifies this as the location during the entirety of this visit.     Video-Visit Details    Type of service:  Video Visit    Video start: 1202  Video end: 1219      Originating Location (pt. Location): Home    Distant Location (provider location):  On-site    Platform used for Video Visit: [facility]    2/24/2025      Chief complaint:   -neck pain  -thoracic pain  -low back pain  -his mid back pain is the worst pain         Interval history:  Rik Lyons 49 year old male is known to me for:  Chronic bilateral thoracic back pain  Chronic upper back pain  Pain of cervical facet joint  Lumbar facet joint pain  Spondylosis of lumbar region without myelopathy or radiculopathy  Degeneration of intervertebral disc of lumbar region with discogenic back pain  Myofascial pain  Muscle spasm  Claustrophobia with MRI  PMHx includes: hypertension  PSHx includes: colonoscopy (2023), appendectomy      Recommendations/plan at the last visit on 1/27/2025 included:  Physical Therapy: consider in the future  Clinical Health Psychologist to address issues of relaxation, behavioral change, coping style, and other factors important to improvement: none  Diagnostic Studies: obtain MRI of neck and mid back (cervical and thoracic).   [place] [facility] Scheduling:  [facility] [place]: [phone]  Rufina Clarke: [phone]  [facility] (including [place]): [phone]  Take VALIUM 5mg 1 tablet 30-60 minutes prior to MRI, don't drive for 24 hours after taking  "this medication  Medication Management:   START tizanidine 2mg take 1-2 tabs up to 3 times daily as needed for muscle spasms   Wait 2-3 days THEN START gabapentin 300mg capsules. Take 300mg at bedtime for 7 days, then take 300mg twice daily for 7 days, then take 300mg three times daily for 7 days, then take 300mg in the morning and afternoon and 600mg (2 capsules) at bedtime. If side effects, reduce to last tolerable dosage.   We don't recommend use of opiate/narcotics for chronic pain management  Further procedures recommended: none at present, need MRI first  Recommendations/follow-up for PCP:  see above  Release of information: see above  Follow up: video visit with Princess at 11:30 AM on Monday 2/24/2025.         Since  last visit, Rik Lyons reports:    Interval history February 24, 2025  -reviewed slow increase in gabapentin to 600mg three times daily. Increase by 300mg/day every week until taking 2 capsules three times daily.  -tried tizanidine, sometimes made him drowsy even the next day. Reviewed it is safe to break that 2mg tab in half.    -reviewed cervical and thoracic MRI scans with patient. Discussed utility of trying cervical ERICKA to help cover neck and upper thoracic back pain.   He has mod to severe cervical canal stenosis at C3-4. Denies any pain radiation at all to the arms. If REJI not effective, may be reasonable to have him see neurosurgery or ortho spine for their expert opinion.     Pain history collected at initial consult on 1/27/2025  -had low back for many years, over a decade. His job is not great for his back, he does install of hardwood floors. He does not have pain in his legs. He has had injections in the past, sounds like LESI in the past which have been helpful.    Starting September 2024, on golf trip, as he swung had immediate pain in the mid to upper back. It hurts when he breathes or coughs. This pain has not gone away. He states he feels like \"my back is broken.\" Getting up out " "of a chair at home can be very painful. He feels better during the day while he is working, the afternoon after a lunch break bothers him more.           At this point, the patient's participation with our multidisciplinary team includes:  The patient has been compliant with the program.  PT -not ordered  Health Psych - not ordered         Pain rating: intensity ranges from 0/10 to 10/10, and Averages 3-4/10 on a 0-10 scale.  Pain right now is 0/10.   Describes pain as \"sharp, dull, aching.\"  Pain is constant.        Current pain-related medication treatments include:  -gabapentin 300mg capsules, take 1 in the AM and afternoon and 2 at bedtime (somewhat helpful, denies side effects)  -tizanidine 2mg take 1 tab three times daily as needed for muscle spasms (helpful, but can feel groggy the next day)    Other pertinent medications:  none      Previous medication treatments included:    OPIATES:after surgery, not sure which one  NSAIDS: Advil (helpful)  MUSCLE RELAXANTS: cyclobenzaprine (unsure), tizanidine (somewhat helpful)  ANTI-MIGRAINE MEDS: none  ANTI-DEPRESSANTS: none  SLEEP AIDS: none  ANTI-CONVULSANTS: gabapentin (somewhat helpful)  TOPICALS: lidocaine patches (somewhat helpful)  ANXIOLYTICS: alprazolam (unsure)  MEDICAL CANNABIS: none  Other meds: none    Medications and Allergies reviewed. Yes     Other treatments have included:  Rik Lyons has been seen at a pain clinic in the past.  Has had injections with our clinic in the past    Physical therapy: No    Psychologist: No   Chiropractor: Yes somewhat helpful  Acupuncture: No   Pharmacotherapy:    Opioids: No     Non-opioids:  Yes  TENs Unit/electric stim: Yes at chiropractic office, unsure if helpful  Heat/Cold: No       Injections:   -12/21/2027 L4-5 interlaminar ERICKA with Dr. Rajiv Chan   -7/13/2028 lumbar interlaminar ERICKA with Dr. Adria Benito  -2/12/2019 left SI joint injection with Dr. Maranda Arango   -8/13/2020 lumbar L4-5 interlaminar ERICKA " with Dr. Alberto Koenig     Pertinent Surgeries:  none    Past Medical History:  Past Medical History:   Diagnosis Date    Hypertension      Past Surgical History:  Past Surgical History:   Procedure Laterality Date    APPENDECTOMY      COLONOSCOPY WITH CO2 INSUFFLATION N/A 5/19/2023    Procedure: COLONOSCOPY, WITH CO2 INSUFFLATION;  Surgeon: Sheldon Morel MD;  Location:  OR     Medications:  Current Outpatient Medications   Medication Sig Dispense Refill    amLODIPine (NORVASC) 5 MG tablet Take 1 tablet (5 mg) by mouth daily. 90 tablet 0    atorvastatin (LIPITOR) 40 MG tablet Take 1 tablet (40 mg) by mouth daily. 90 tablet 0    diazepam (VALIUM) 5 MG tablet Take 30-60 minutes prior to appt for MRI. Don't drive for 24 hour after taking this medication for claustrophobia with MRI 1 tablet 0    gabapentin (NEURONTIN) 300 MG capsule Take 300mg at bedtime for 7 days, then take 300mg twice daily for 7 days, then take 300mg three times daily for 7 days, then take 300mg in the morning and afternoon and 600mg (2 capsules) at bedtime. If side effects, reduce to last tolerable dosage. 120 capsule 0    losartan-hydrochlorothiazide (HYZAAR) 100-12.5 MG tablet Take 1 tablet by mouth daily. 90 tablet 0    magnesium gluconate (MAGONATE) 250 MG tablet Take 500 mg by mouth 2 times daily      metoprolol succinate ER (TOPROL XL) 100 MG 24 hr tablet Take 1 tablet (100 mg) by mouth daily. 90 tablet 2    tiZANidine (ZANAFLEX) 2 MG tablet Take 1-2 tablets (2-4 mg) by mouth 3 times daily as needed for muscle spasms. 45 tablet 1    vitamin D3 (CHOLECALCIFEROL) 50 mcg (2000 units) tablet Take 1 tablet by mouth daily       Allergies:   No Known Allergies  Social History:  Home situation:  with kids x 2 at home  Occupation/Schooling: owns own business as   Tobacco use: quit smoking cigarettes 2014 and uses vape now daily  Alcohol use: weekends, beer and hard liquor  Drug use: none. Had a poor experience  with cannabis  History of chemical dependency treatment: none    Family history:  Family History   Problem Relation Age of Onset    Other Cancer Mother     Coronary Artery Disease Father         age 53    Diabetes Father              Physical Exam:  There were no vitals filed for this visit.    Behavioral observations:  Awake, alert. Cooperative.   Pulm: respirations easy and unlabored. Able to speak in full sentences without SOB or cough noted.          IMAGING:  MR LUMBAR SPINE WITHOUT CONTRAST 10/31/2017 8:30 AM     HISTORY: Bilateral low back pain and sciatica.     TECHNIQUE: Sagittal T1 and T2, sagittal IR, and transverse proton  density and T2-weighted pulse sequences.     FINDINGS: Five lumbar vertebrae are assumed. Mild loss of disc signal  with relatively normal disc height is noted at L5-S1. Moderate left  L4-5 apophyseal joint degenerative arthrosis is noted with mild  periarticular reactive marrow edema. Body heights and sagittal  alignment are within normal limits. The conus medullaris is  unremarkable in appearance on the sagittal images.      L2-3, L3-4: Normal.     L4-5: Annular bulge or broad-based central disc protrusion minimally  indenting the thecal sac. No central stenosis. Mild to moderate  bilateral foraminal stenosis below the exiting nerve roots.     L5-S1: Small central disc extrusion with slight cephalad dissection  slightly to the right of midline. This only mildly indents the thecal  sac. Small 0.3 cm disc fragment is also noted lateral to the right S1  nerve root near its origin from the thecal sac. There is no central  stenosis or asymmetrical displacement of either S1 nerve root. The L5  neural foramina are widely patent.                                                                      IMPRESSION:  1. L4-5 left apophyseal joint degenerative arthrosis and periarticular  reactive marrow edema suggesting this could be a pain generator.  2. L4-5 mild to moderate bilateral foraminal  stenosis.  3. L5-S1 small cephalad dissecting disc extrusion to the right of  midline without apparent direct neural impingement or stenosis. Small  0.3 cm disc fragment is also noted lateral to the right S1 nerve root  near its origin from the thecal sac. No nerve root displacement is  demonstrated.     ALDAIR ELENA MD        EXAM: MR THORACIC SPINE W/O CONTRAST, MR CERVICAL SPINE W/O CONTRAST (reviewed with patient in office visit on 2/24/2025)  LOCATION: [facility]  DATE: 2/17/2025     INDICATION: Chronic bilateral thoracic back pain.   COMPARISON: None.  TECHNIQUE: Routine Cervical and Thoracic Spine MRI without IV contrast.     FINDINGS:   CERVICAL SPINE MRI:  Normal alignment of the cervical vertebrae. Normal cervical lordosis. Normal vertebral body heights and marrow signal. No fracture, destructive bone lesion or infection. Normal cervical spinal cord signal. Partially visualized left thyroid lobe nodule   measuring up to at least 3 cm. The paraspinous soft tissues are unremarkable.     Findings on a level-by-level basis are as follows:     C2-C3: Normal disc height. No disc herniation. Mild uncinate spurring. Normal facets. No significant spinal canal or neural foraminal stenosis.     C3-C4: Mild disc height loss. 4 mm AP dimension central disc osteophyte complex flattens the cord. Bilateral uncinate spurring. Normal facets. Mild to moderate bilateral neural foraminal stenosis. Moderate to severe spinal canal stenosis.     C4-C5: Mild disc height loss. 2 mm AP dimension central disc osteophyte complex contacts the ventral margin of the cord. Bilateral uncinate spurring. Normal facets. Mild bilateral neural foraminal stenosis. Moderate spinal canal stenosis.     C5-C6: Mild disc height loss. 2 mm AP dimension right central disc osteophyte complex flattens the right ventral margin of the cord. Asymmetric right uncinate spurring. Bilateral facet arthrosis. Moderate right and mild left  neural foraminal stenosis.   Moderate spinal canal stenosis.     C6-C7: Mild disc height loss. 3 mm AP dimension central disc osteophyte complex contacts the ventral margin of the cord. Bilateral uncinate spurring and facet arthrosis. Moderate left and mild right neural foraminal stenosis. Moderate spinal canal   stenosis.     C7-T1: Normal disc height. No disc herniation. Bilateral uncinate spurring and facet arthrosis. Mild to moderate bilateral neural foraminal stenosis. No significant spinal canal stenosis.     THORACIC SPINE MRI:  Normal alignment of the thoracic vertebrae. Normal thoracic kyphosis. Normal vertebral body heights and marrow signal. T5 vertebral body intraosseous hemangioma. No fracture, destructive bone lesion or infection. Mild disc height loss and shallow disc   bulge formation at T10-T11 and T11-T12. No disc herniation. Asymmetric right uncinate spurring and facet arthrosis at T2-T3 results in mild neural foraminal stenosis. Mild bilateral neural foraminal stenosis T10-T12. Mild spinal canal stenosis at   T11-T12. Normal thoracic spinal cord signal. Flowing ventral osteophytes throughout the thoracic spine, consistent with diffuse idiopathic skeletal hyperostosis. The paraspinous soft tissues are unremarkable.                                                                      IMPRESSION:  CERVICAL SPINE MRI:  1.  Multilevel cervical spondylosis.  2.  Moderate to severe spinal canal stenosis at C3-C4. Moderate spinal canal stenosis C4-C7.  3.  Moderate neural foraminal stenosis on the right at C5-C6 and on the left at C6-C7.  4.  Normal cervical spinal cord signal.  5.  Left thyroid nodule measuring up to at least 3 cm. Recommend thyroid ultrasound for further evaluation.     THORACIC SPINE MRI:  1.  Mild lower thoracic degenerative disc disease and diffuse idiopathic skeletal hyperostosis.  2.  Mild neural foraminal stenosis on the right at T2-T3 and bilaterally at T10-T12.  3.  Mild spinal  canal stenosis at T11-T12.  4.  Normal thoracic spinal cord.        REFERENCE:  Chao DURAND et al. Managing Incidental Thyroid Nodules Detected on Imaging: White Paper of the ACR Incidental Thyroid Findings Committee. JACR 2015; 12:143-150.     Incidental thyroid nodule detected on CT or MRI without suspicious findings. Applies to general population without limited life expectancy or significant comorbidities.     Age greater than or equal to 35 years  Less than 1.5 cm: No further evaluation.  Greater than or equal to 1.5 cm: Evaluate with thyroid ultrasound.      LABS:  Creatinine   Date Value Ref Range Status   11/19/2024 0.87 0.67 - 1.17 mg/dL Final   06/26/2020 0.80 0.66 - 1.25 mg/dL Final     GFR Estimate   Date Value Ref Range Status   11/19/2024 >90 >60 mL/min/1.73m2 Final     Comment:     eGFR calculated using 2021 CKD-EPI equation.   06/26/2020 >90 >60 mL/min/[1.73_m2] Final     Comment:     Non  GFR Calc  Starting 12/18/2018, serum creatinine based estimated GFR (eGFR) will be   calculated using the Chronic Kidney Disease Epidemiology Collaboration   (CKD-EPI) equation.       Alkaline Phosphatase   Date Value Ref Range Status   06/26/2020 108 40 - 150 U/L Final     AST   Date Value Ref Range Status   06/26/2020 30 0 - 45 U/L Final     ALT   Date Value Ref Range Status   06/26/2020 62 0 - 70 U/L Final          Screening tools:     DIRE Score for ongoing opioid management is calculated as follows:    Diagnosis = 2    Intractability = 2    Risk: Psych = 2  Chem Hlth = 2  Reliability = 2  Social = 2    Efficacy = 2    Total DIRE Score = 14 (14 or higher predicts good candidate for ongoing opioid management; 13 or lower predicts poor candidate for opioid management)         [place]  review:  Reviewed [place]  2/24/2025- no concerning fills.  Princess Eisenberg APRN, RN CNP, FNP  [facility]  [place] location          Assessment:  Cervical DDD  Thoracic DDD  Chronic neck  pain  Chronic bilateral thoracic back pain  Chronic upper back pain  Pain of cervical facet joint  Lumbar facet joint pain  Spondylosis of lumbar region without myelopathy or radiculopathy  Degeneration of intervertebral disc of lumbar region with discogenic back pain    Myofascial pain  Muscle spasm  Claustrophobia with MRI  PMHx includes: hypertension  PSHx includes: colonoscopy (2023), appendectomy        Plan:   Physical Therapy: consider in the future  Clinical Health Psychologist to address issues of relaxation, behavioral change, coping style, and other factors important to improvement: none  Diagnostic Studies:none  Medication Management:   Continue  tizanidine 2mg take 1-2 tabs up to 3 times daily as needed for muscle spasms   Slow increase in gabapentin 300mg capsules.  take 300mg in the morning and afternoon and 600mg (2 capsules) at bedtim for 7 days, then take 600mg in the AM, 300mg in the afternoon and 600mg at bedtime for 7 days, then take 600mg three times daily. If side effects, reduce to last tolerable dosage.   We don't recommend use of opiate/narcotics for chronic pain management  Further procedures recommended: Schedule cervical epidural steroid injection, our office will contact you  Recommendations/follow-up for PCP:  see above  Release of information: see above  Follow up: schedule to see me about 3-4 weeks after the cervical injection       ASSESSMENT AND PLAN:  (M50.30) DDD (degenerative disc disease), cervical  (primary encounter diagnosis)  Comment:   Plan: PAIN INJECTION EVAL/TREAT/FOLLOW UP            (M51.34) DDD (degenerative disc disease), thoracic  Comment:   Plan: PAIN INJECTION EVAL/TREAT/FOLLOW UP            (M54.2,  G89.29) Chronic neck pain  Comment:   Plan: PAIN INJECTION EVAL/TREAT/FOLLOW UP            (M54.6,  G89.29) Chronic bilateral thoracic back pain  Comment:   Plan: PAIN INJECTION EVAL/TREAT/FOLLOW UP, gabapentin        (NEURONTIN) 300 MG capsule            (M54.9,   G89.29) Chronic upper back pain  Comment:   Plan: gabapentin (NEURONTIN) 300 MG capsule            (M54.2) Pain of cervical facet joint  Comment:   Plan: gabapentin (NEURONTIN) 300 MG capsule            (M54.59) Lumbar facet joint pain  Comment:   Plan: gabapentin (NEURONTIN) 300 MG capsule            (M47.816) Spondylosis of lumbar region without myelopathy or radiculopathy  Comment:   Plan: gabapentin (NEURONTIN) 300 MG capsule            (M51.360) Degeneration of intervertebral disc of lumbar region with discogenic back pain  Comment:   Plan: gabapentin (NEURONTIN) 300 MG capsule              BILLING TIME DOCUMENTATION:   TOTAL TIME includes:   Time spent preparing to see the patient: 5 minutes (reviewing records and tests)  Time spend face to face with the patient: 17 minutes  Time spent ordering tests, medications, procedures and referrals: 0 minutes  Time spent Referring and communicating with other healthcare professionals: 0 minutes  Documenting clinical information in Epic: 15 minutes    The total TIME spent on this patient on the day of the appointment was 37 minutes.          Princess LOYA, RN CNP, FNP  [facility]

## 2025-02-26 ENCOUNTER — TELEPHONE (OUTPATIENT)
Dept: PALLIATIVE MEDICINE | Facility: CLINIC | Age: 50
End: 2025-02-26
Payer: COMMERCIAL

## 2025-02-26 DIAGNOSIS — E04.1 THYROID NODULE: Primary | ICD-10-CM

## 2025-02-27 NOTE — TELEPHONE ENCOUNTER
Called patient re: finding of thyroid nodule incidentally found on cervical MRI.   Radiology recommends a thyroid ultrasound for further evaluation.   Patient is in agreement with this plan.   Order placed for thyroid ultrasound.     Princess LOYA, RN CNP, FNP  [facility]  [facility]

## 2025-03-12 ENCOUNTER — TELEPHONE (OUTPATIENT)
Dept: PALLIATIVE MEDICINE | Facility: CLINIC | Age: 50
End: 2025-03-12

## 2025-03-12 ENCOUNTER — ANCILLARY PROCEDURE (OUTPATIENT)
Dept: ULTRASOUND IMAGING | Facility: CLINIC | Age: 50
End: 2025-03-12
Attending: NURSE PRACTITIONER
Payer: COMMERCIAL

## 2025-03-12 DIAGNOSIS — E04.1 THYROID NODULE: Primary | ICD-10-CM

## 2025-03-12 DIAGNOSIS — E04.1 THYROID NODULE: ICD-10-CM

## 2025-03-12 PROCEDURE — 76536 US EXAM OF HEAD AND NECK: CPT | Mod: TC | Performed by: RADIOLOGY

## 2025-03-12 NOTE — RESULT ENCOUNTER NOTE
At least 2 nodules over 2.5cm in size, per TI-RADS, a fine needle aspiration is indicated.   Princess LOYA RN CNP, FNP  [facility]  [facility] [place]

## 2025-03-12 NOTE — TELEPHONE ENCOUNTER
Called patient to review his thyroid ultrasound results. Given the size of several of the nodules, an FNA or fine needle aspiration is indicated.   Patient verbalizes understanding and is in agreement.   Order placed. He will be contacted by MANASA LOYA RN CNP, FNP  [facility]  [facility]

## 2025-03-18 ENCOUNTER — ANCILLARY ORDERS (OUTPATIENT)
Dept: PALLIATIVE MEDICINE | Facility: CLINIC | Age: 50
End: 2025-03-18

## 2025-03-18 ENCOUNTER — RADIOLOGY INJECTION OFFICE VISIT (OUTPATIENT)
Dept: PALLIATIVE MEDICINE | Facility: CLINIC | Age: 50
End: 2025-03-18
Payer: COMMERCIAL

## 2025-03-18 VITALS — SYSTOLIC BLOOD PRESSURE: 152 MMHG | HEART RATE: 81 BPM | DIASTOLIC BLOOD PRESSURE: 98 MMHG | OXYGEN SATURATION: 96 %

## 2025-03-18 DIAGNOSIS — M47.812 SPONDYLOSIS OF CERVICAL REGION WITHOUT MYELOPATHY OR RADICULOPATHY: ICD-10-CM

## 2025-03-18 DIAGNOSIS — M47.812 SPONDYLOSIS OF CERVICAL REGION WITHOUT MYELOPATHY OR RADICULOPATHY: Primary | ICD-10-CM

## 2025-03-18 DIAGNOSIS — M54.12 CERVICAL RADICULOPATHY: Primary | ICD-10-CM

## 2025-03-18 RX ORDER — DEXAMETHASONE SODIUM PHOSPHATE 10 MG/ML
10 INJECTION, SOLUTION INTRAMUSCULAR; INTRAVENOUS ONCE
Status: COMPLETED | OUTPATIENT
Start: 2025-03-18 | End: 2025-03-18

## 2025-03-18 RX ADMIN — DEXAMETHASONE SODIUM PHOSPHATE 10 MG: 10 INJECTION, SOLUTION INTRAMUSCULAR; INTRAVENOUS at 09:28

## 2025-03-18 ASSESSMENT — PAIN SCALES - GENERAL
PAINLEVEL_OUTOF10: MILD PAIN (3)
PAINLEVEL_OUTOF10: MILD PAIN (2)

## 2025-03-18 NOTE — NURSING NOTE
Pre-procedure Intake  If YES to any questions or NO to having a   Please complete laminated checklist and leave on the computer keyboard for Provider, verbally inform provider if able.    For SCS Trial, RFA's or any sedation procedure:  Have you been fasting? NA  If yes, for how long?     Are you taking any any blood thinners such as Coumadin, Warfarin, Jantoven, Pradaxa Xarelto, Eliquis, Edoxaban, Enoxaparin, Lovenox, Heparin, Arixtra, Fondaparinux, or Fragmin? OR Antiplatelet medication such as Plavix, Brilinta, or Effient?   No   If yes, when did you take your last dose?     Do you take aspirin?  Yes  If cervical procedure, have you held aspirin for 6 days?   Yes    Is the Pt taking any GLP-1 Antagonist (hold needed for sedation patients only)  (semaglutide (Ozempic, Wegovy), dulaglutide (Trulicity), exenatide ER (Bydureon), tirzepatide (Mounjaro), Liraglutide (Saxenda, Victoza), semaglutide (Rybelsus)     NA  If yes, when did you take your last dose?     Do you have any allergies to contrast dye, iodine, steroid and/or numbing medications?  NO    Are you currently taking antibiotics or have an active infection?  NO    Have you had a fever/elevated temperature within the past week? NO    Are you currently taking oral steroids? NO    Do you have a ? Yes    Are you pregnant or breastfeeding?  NO    Have you received any vaccinations in the last week? NO    Notify provider and RNs if systolic BP >170, diastolic BP >100, P >100 or O2 sats < 90%

## 2025-03-18 NOTE — PROGRESS NOTES
[facility] - Procedure Note    Date of Visit: 3/18/2025    Procedure performed: C7-T1 interlaminar epidural steroid injection with fluoroscopic guidance  Diagnosis: Cervical radiculitis/radiculopathy  : Rajiv Chan MD  Anesthesia: none    Indications: Rik Lyons is a 49 year old male who is seen  for cervical epidural steroid injection. The patient describes neck pain rad to his ue. The patient has been exhibiting symptoms consistent with cervical intraspinal inflammation and radiculopathy. Symptoms have been persistent, disabling, and intermittently severe. The patient reports minimal improvement with conservative treatment, including meds/pt.    Cervical MRI rev    Allergies:    No Known Allergies     Vitals:  BP (!) 152/98   Pulse 81   SpO2 96%     Review of Systems: The patient denies recent fever, chills, illness, use of antibiotics or anticoagulants. All other 10-point review of systems negative.     Procedure: The procedure and risks were explained, and informed written consent was obtained from the patient. Risks include but are not limited to: infection, bleeding, increased pain, and damage to soft tissue, nerve, muscle, and vasculature structures. After getting informed consent, patient was brought into the procedure suite and was placed in a prone position on the procedure table. A Pause for the Cause was performed. Patient was prepped and draped in sterile fashion.     The C7-T1 interspace was identified with use of fluoroscopy in AP view. A 25-gauge, 1.5 inch needle was used to anesthetize the skin and subcutaneous tissue entry site with a total of 2 ml of 1% lidocaine. Under fluoroscopic visualization, a 22-gauge, 3.5 inch Tuohy epidural needle was slowly advanced towards the epidural space a few millimeters  of midline. The latter part of the needle advancement was guided with fluoroscopy in the lateral view. The epidural space was identified using loss of  resistance technique. After negative aspiration for heme and cerebrospinal fluid, a total of 1 mL of Omnipaque was injected to confirm needle placement. 9 mL of contrast was wasted. Epidurogram confirmed spread within the posterior epidural space. 2 ml of  NS,1 ml 10mg/ml of dexamethasone, and 1 ml of preservative free 0.25% bupivacaine was injected. The needle was removed.  Images were saved to PACS.    The patient tolerated the procedure well, and there was no evidence of procedural complications. No new sensory or motor deficits were noted following the procedure. The patient was stable and able to ambulate on discharge home. Post-procedure instructions were provided.     Pre-procedure pain score: 3/10 in the neck, 3/10 in the arm  Post-procedure pain score: 2/10 in the neck, 2/10 in the arm    Assessment/Plan: Rik Lyons is a 49 year old male s/p cervical interlaminar epidural steroid injection today for cervical spondylosis and radiculitis/radiculopathy.     Following today's procedure, the patient was advised to contact the [facility] for any of the following:   Fever, chills, or night sweats   New onset of pain, numbness, or weakness   Any questions/concerns regarding the procedure  If unable to contact the Pain Center, the patient was instructed to go to a local Emergency Room for any complications.     Follow-up with provider in 2 weeks for post-procedure evaluation.    Rajiv Chan MD   Pain Management    [facility]

## 2025-03-18 NOTE — PATIENT INSTRUCTIONS
[facility]   Procedure Discharge Instructions    Today you saw:    Dr. Rajiv Chan,         You had a(n):  Cervical epidural steroid injection    Medications used for interlaminar ERICKA: Lidocaine, Omnipaque, Kenalog, Bupivicaine, normal saline        If you were holding your blood thinning medication, please restart taking it: in 24 hours  Be cautious with activities. Numbness and/or weakness in the upper extremities may occur for up to 6-8 hours after the procedure due to effect of the local anesthetic  Do not drive for 6 hours. The effect of the local anesthetic could slow your reflexes.   You may resume your regular activities after 24 hours  Avoid strenuous activity for the first 24 hours  You may shower, however avoid swimming, tub baths or hot tubs for 24 hours following your procedure  You may have a mild to moderate increase in pain for several days following the injection.  It may take up to 14 days for the steroid medication to start working although you may feel the effect as early as a few days after the procedure.     You may use ice packs for 10-15 minutes, 3 to 4 times a day at the injection site for comfort  Do not use heat to painful areas for 6 to 8 hours. This will give the local anesthetic time to wear off and prevent you from accidentally burning your skin.   Unless you have been directed to avoid the use of anti-inflammatory medications (NSAIDS), you may use medications such as ibuprofen, Aleve or Tylenol for pain control if needed.   If you have diabetes, check your blood sugar more frequently than usual as your blood sugar may be higher than normal for 10-14 days following a steroid injection. Contact your doctor who manages your diabetes if your blood sugar is higher than usual  Possible side effects of steroids that you may experience include flushing, elevated blood pressure, increased appetite, mild headaches and restlessness.  All of these symptoms will  Patient here for f/u to discuss next steps. Patient is accompanied by daughter and wife. Patient has no current complaints.     Education Record    Learner:  Patient, Spouse, and Family Member    Disease / Diagnosis: CLL    Barriers / Limitations:  None   Comments:    Method:  Discussion   Comments:    General Topics:  Medication, Side effects and symptom management, and Plan of care reviewed   Comments:    Outcome:  Shows understanding   Comments:       get better with time.  If you experience any of the following, call the Pain Clinic during work hours (Mon-Friday 8-4:30 pm) at [phone] or the Provider Line after hours at [phone]:  -Fever over 100 degree F  -Swelling, bleeding, redness, drainage, warmth at the injection site  -Progressive weakness or numbness in your arms  -Loss of bowel or bladder function  -Unusual headache not relieved by Tylenol or other pain reliever  -Unusual new onset of pain that is not improving

## 2025-03-18 NOTE — NURSING NOTE
Discharge Information    IV Discontiued Time:  NA    Amount of Fluid Infused:  NA    Discharge Criteria = When patient returns to baseline or as per MD order    Consciousness:  Pt is fully awake    Circulation:  BP +/- 20% of pre-procedure level    Respiration:  Patient is able to breathe deeply    O2 Sat:  Patient is able to maintain O2 Sat >92% on room air    Activity:  Moves 4 extremities on command    Ambulation:  Patient is able to stand and walk or stand and pivot into wheelchair    Dressing:  Clean/dry or No Dressing    Notes:   Discharge instructions and AVS given to patient    Patient meets criteria for discharge?  YES    Admitted to PCU?  No    Responsible adult present to accompany patient home?  Yes    Signature/Title:    Caroline Zuñiga RN  RN Care Coordinator  [facility]

## 2025-04-22 ENCOUNTER — ANCILLARY PROCEDURE (OUTPATIENT)
Dept: ULTRASOUND IMAGING | Facility: CLINIC | Age: 50
End: 2025-04-22
Attending: NURSE PRACTITIONER
Payer: COMMERCIAL

## 2025-04-22 DIAGNOSIS — E04.1 THYROID NODULE: ICD-10-CM

## 2025-04-22 PROCEDURE — 10005 FNA BX W/US GDN 1ST LES: CPT | Performed by: STUDENT IN AN ORGANIZED HEALTH CARE EDUCATION/TRAINING PROGRAM

## 2025-04-22 PROCEDURE — 10006 FNA BX W/US GDN EA ADDL: CPT | Performed by: STUDENT IN AN ORGANIZED HEALTH CARE EDUCATION/TRAINING PROGRAM

## 2025-04-22 PROCEDURE — 88173 CYTOPATH EVAL FNA REPORT: CPT | Performed by: PATHOLOGY

## 2025-04-22 NOTE — RESULT ENCOUNTER NOTE
Reviewed, awaiting pathology report   Princess LOYA, RN CNP, FNP  [facility]

## 2025-04-23 LAB
PATH REPORT.COMMENTS IMP SPEC: NORMAL
PATH REPORT.FINAL DX SPEC: NORMAL
PATH REPORT.GROSS SPEC: NORMAL
PATH REPORT.MICROSCOPIC SPEC OTHER STN: NORMAL
PATH REPORT.RELEVANT HX SPEC: NORMAL

## 2025-04-26 DIAGNOSIS — I10 BENIGN ESSENTIAL HYPERTENSION: ICD-10-CM

## 2025-04-26 DIAGNOSIS — E78.5 DYSLIPIDEMIA: ICD-10-CM

## 2025-04-28 RX ORDER — LOSARTAN POTASSIUM AND HYDROCHLOROTHIAZIDE 12.5; 1 MG/1; MG/1
1 TABLET ORAL DAILY
Qty: 90 TABLET | Refills: 0 | Status: SHIPPED | OUTPATIENT
Start: 2025-04-28 | End: 2025-04-29

## 2025-04-28 RX ORDER — AMLODIPINE BESYLATE 5 MG/1
5 TABLET ORAL DAILY
Qty: 90 TABLET | Refills: 0 | Status: SHIPPED | OUTPATIENT
Start: 2025-04-28 | End: 2025-04-29

## 2025-04-28 RX ORDER — ATORVASTATIN CALCIUM 40 MG/1
40 TABLET, FILM COATED ORAL DAILY
Qty: 90 TABLET | Refills: 1 | Status: SHIPPED | OUTPATIENT
Start: 2025-04-28 | End: 2025-04-29

## 2025-04-29 ENCOUNTER — MYC REFILL (OUTPATIENT)
Dept: FAMILY MEDICINE | Facility: CLINIC | Age: 50
End: 2025-04-29
Payer: COMMERCIAL

## 2025-04-29 DIAGNOSIS — I10 BENIGN ESSENTIAL HYPERTENSION: ICD-10-CM

## 2025-04-29 DIAGNOSIS — E78.5 DYSLIPIDEMIA: ICD-10-CM

## 2025-04-30 RX ORDER — ATORVASTATIN CALCIUM 40 MG/1
40 TABLET, FILM COATED ORAL DAILY
Qty: 90 TABLET | Refills: 1 | Status: SHIPPED | OUTPATIENT
Start: 2025-04-30

## 2025-04-30 RX ORDER — LOSARTAN POTASSIUM AND HYDROCHLOROTHIAZIDE 12.5; 1 MG/1; MG/1
1 TABLET ORAL DAILY
Qty: 90 TABLET | Refills: 0 | Status: SHIPPED | OUTPATIENT
Start: 2025-04-30

## 2025-04-30 RX ORDER — AMLODIPINE BESYLATE 5 MG/1
5 TABLET ORAL DAILY
Qty: 90 TABLET | Refills: 0 | Status: SHIPPED | OUTPATIENT
Start: 2025-04-30

## 2025-05-06 DIAGNOSIS — E04.1 THYROID NODULE: Primary | ICD-10-CM

## 2025-05-06 NOTE — PROGRESS NOTES
Signed new order for thyroid ultrasound guided FNA    Princess LOYA, RN CNP, FNP  [facility]

## 2025-05-06 NOTE — PROGRESS NOTES
New order placed for repeat thyroid ultrasound with FNA for thyroid nodules. This needs to be repeated since last FNA had insufficient follicular cells.     Princess LOYA RN CNP, FNP  [facility]  [facility]

## 2025-06-27 ENCOUNTER — ANCILLARY PROCEDURE (OUTPATIENT)
Dept: ULTRASOUND IMAGING | Facility: CLINIC | Age: 50
End: 2025-06-27
Attending: NURSE PRACTITIONER
Payer: COMMERCIAL

## 2025-06-27 DIAGNOSIS — E04.1 THYROID NODULE: ICD-10-CM

## 2025-06-27 PROCEDURE — 88173 CYTOPATH EVAL FNA REPORT: CPT | Mod: TC | Performed by: NURSE PRACTITIONER

## 2025-06-27 PROCEDURE — 10005 FNA BX W/US GDN 1ST LES: CPT | Performed by: STUDENT IN AN ORGANIZED HEALTH CARE EDUCATION/TRAINING PROGRAM

## 2025-06-27 PROCEDURE — 88172 CYTP DX EVAL FNA 1ST EA SITE: CPT | Mod: 26 | Performed by: PATHOLOGY

## 2025-06-27 PROCEDURE — 88173 CYTOPATH EVAL FNA REPORT: CPT | Mod: 26 | Performed by: PATHOLOGY

## 2025-06-27 RX ORDER — LIDOCAINE HYDROCHLORIDE 10 MG/ML
5 INJECTION, SOLUTION INFILTRATION; PERINEURAL ONCE
Status: COMPLETED | OUTPATIENT
Start: 2025-06-27 | End: 2025-06-27

## 2025-06-27 RX ADMIN — LIDOCAINE HYDROCHLORIDE 3 ML: 10 INJECTION, SOLUTION INFILTRATION; PERINEURAL at 09:20

## 2025-06-30 LAB
PATH REPORT.COMMENTS IMP SPEC: ABNORMAL
PATH REPORT.COMMENTS IMP SPEC: YES
PATH REPORT.FINAL DX SPEC: ABNORMAL
PATH REPORT.GROSS SPEC: ABNORMAL
PATH REPORT.MICROSCOPIC SPEC OTHER STN: ABNORMAL
PATH REPORT.RELEVANT HX SPEC: ABNORMAL

## 2025-07-01 ENCOUNTER — RESULTS FOLLOW-UP (OUTPATIENT)
Dept: PALLIATIVE MEDICINE | Facility: CLINIC | Age: 50
End: 2025-07-01
Payer: COMMERCIAL

## 2025-07-01 ENCOUNTER — RESULTS FOLLOW-UP (OUTPATIENT)
Dept: PALLIATIVE MEDICINE | Facility: CLINIC | Age: 50
End: 2025-07-01

## 2025-07-01 ENCOUNTER — TELEPHONE (OUTPATIENT)
Dept: FAMILY MEDICINE | Facility: CLINIC | Age: 50
End: 2025-07-01
Payer: COMMERCIAL

## 2025-07-01 NOTE — RESULT ENCOUNTER NOTE
"Thyroid fine needle aspirate test noted to be adequate.   Shows \"Atypia of undetermined significance\"  The [place] implied risk of malignancy and recommended clinical management:  Atypia of undetermined significance diagnosis has a 22 (13-30)% risk of malignancy. Molecular testing, repeat FNA (least 4-6 weeks from previous aspiration with optimal time being 12 weeks after last aspiration), diagnostic lobectomy, or surveillance is recommended.    I will forward these results to patient and his Primary Care Provider and Primary Care Provider Dr. Darrell Jones and patient can determine how best to proceed at this time.     Princess LOYA, RN CNP, FNP  [facility]  [place] location    "

## 2025-07-02 NOTE — TELEPHONE ENCOUNTER
"----- Message from Princess Eisenberg sent at 7/1/2025  6:45 PM CDT -----  Regarding: ongoing thyroid nodule management  Hi Dr. Jones-    I found incidental thyroid nodules on cervical MRI of Rik's neck. He had one thyroid FNA that was not adequate and this 2nd one was adequate but shows \"atypia of undetermined significance\"    The comments note that there is a 13-30% chance of malignancy. Molecular testing, repeat FNA at least 6 weeks after this one and optimal timing is 12 weeks following this FNA, diagnostic lobectomy or surveillance is recommended.     I am asking you to take over at this juncture. I have reached out to Rik to let him know that I have requested your input and appropriate management going forward. I did forward you the FNA results as well.    Thanks.     Please reach out with any questions.     Princess LOYA, RN CNP, FNP  [facility]  [facility]  "

## 2025-07-02 NOTE — TELEPHONE ENCOUNTER
Follow-up md appointment in 2 months to repeat labs/blood pressure /med check and discuss thyroid nodules. Darrell Jones MD

## 2025-07-26 DIAGNOSIS — I10 BENIGN ESSENTIAL HYPERTENSION: ICD-10-CM

## 2025-07-26 NOTE — LETTER
July 28, 2025    Rik PEÑA Given  [address] [id number]    Dear Rik,     We recently received a refill request for losartan-hydrochlorothiazide (HYZAAR) 100-12.5 MG tablets and amlodipine 5 mg tablets.  We have refilled this for a one time 90 day supply only because you are due for a:    Medication Recheck office visit in October 2025.    Please call at your earliest convenience so that there will not be a delay with your future refills.    Thank you,         Your [facility] Team/[facility]  [phone]          Electronically signed

## 2025-07-28 RX ORDER — AMLODIPINE BESYLATE 5 MG/1
5 TABLET ORAL DAILY
Qty: 90 TABLET | Refills: 0 | Status: SHIPPED | OUTPATIENT
Start: 2025-07-28

## 2025-07-28 RX ORDER — LOSARTAN POTASSIUM AND HYDROCHLOROTHIAZIDE 12.5; 1 MG/1; MG/1
1 TABLET ORAL DAILY
Qty: 90 TABLET | Refills: 0 | Status: SHIPPED | OUTPATIENT
Start: 2025-07-28

## 2025-07-28 NOTE — TELEPHONE ENCOUNTER
Reminder letter mailed to patient with provider's message written verbatim.  Mindy AGUILAR    [facility]

## (undated) DEVICE — PREP CHLORAPREP 26ML TINTED ORANGE  260815

## (undated) DEVICE — GLOVE BIOGEL PI ULTRATOUCH G SZ 7.5 42175

## (undated) DEVICE — SOL WATER IRRIG 1000ML BOTTLE 07139-09

## (undated) DEVICE — GLOVE BIOGEL PI MICRO INDICATOR UNDERGLOVE SZ 7.5 48975

## (undated) RX ORDER — BUPIVACAINE HYDROCHLORIDE 5 MG/ML
INJECTION, SOLUTION EPIDURAL; INTRACAUDAL
Status: DISPENSED
Start: 2018-07-13

## (undated) RX ORDER — SIMETHICONE 40MG/0.6ML
SUSPENSION, DROPS(FINAL DOSAGE FORM)(ML) ORAL
Status: DISPENSED
Start: 2023-05-19

## (undated) RX ORDER — LIDOCAINE HYDROCHLORIDE 10 MG/ML
INJECTION, SOLUTION EPIDURAL; INFILTRATION; INTRACAUDAL; PERINEURAL
Status: DISPENSED
Start: 2025-06-27

## (undated) RX ORDER — DEXAMETHASONE SODIUM PHOSPHATE 10 MG/ML
INJECTION, SOLUTION INTRAMUSCULAR; INTRAVENOUS
Status: DISPENSED
Start: 2018-07-13

## (undated) RX ORDER — METOPROLOL TARTRATE 50 MG
TABLET ORAL
Status: DISPENSED
Start: 2023-08-21

## (undated) RX ORDER — METHYLPREDNISOLONE ACETATE 40 MG/ML
INJECTION, SUSPENSION INTRA-ARTICULAR; INTRALESIONAL; INTRAMUSCULAR; SOFT TISSUE
Status: DISPENSED
Start: 2018-07-13

## (undated) RX ORDER — FENTANYL CITRATE 50 UG/ML
INJECTION, SOLUTION INTRAMUSCULAR; INTRAVENOUS
Status: DISPENSED
Start: 2023-05-19

## (undated) RX ORDER — LIDOCAINE HYDROCHLORIDE AND EPINEPHRINE 10; 10 MG/ML; UG/ML
INJECTION, SOLUTION INFILTRATION; PERINEURAL
Status: DISPENSED
Start: 2018-07-13